# Patient Record
Sex: FEMALE | Race: WHITE | NOT HISPANIC OR LATINO | URBAN - METROPOLITAN AREA
[De-identification: names, ages, dates, MRNs, and addresses within clinical notes are randomized per-mention and may not be internally consistent; named-entity substitution may affect disease eponyms.]

---

## 2014-01-07 VITALS
WEIGHT: 178.57 LBS | BODY MASS INDEX: 31.64 KG/M2 | SYSTOLIC BLOOD PRESSURE: 130 MMHG | HEIGHT: 63 IN | DIASTOLIC BLOOD PRESSURE: 74 MMHG | HEART RATE: 72 BPM

## 2017-04-03 PROBLEM — Z00.00 ENCOUNTER FOR PREVENTIVE HEALTH EXAMINATION: Status: ACTIVE | Noted: 2017-04-03

## 2017-05-24 ENCOUNTER — INPATIENT (INPATIENT)
Facility: HOSPITAL | Age: 82
LOS: 13 days | Discharge: HOME HEALTH | End: 2017-06-07
Attending: PHYSICAL MEDICINE & REHABILITATION

## 2017-05-24 DIAGNOSIS — E11.40 TYPE 2 DIABETES MELLITUS WITH DIABETIC NEUROPATHY, UNSPECIFIED: ICD-10-CM

## 2017-05-24 DIAGNOSIS — E03.9 HYPOTHYROIDISM, UNSPECIFIED: ICD-10-CM

## 2017-05-24 DIAGNOSIS — E78.5 HYPERLIPIDEMIA, UNSPECIFIED: ICD-10-CM

## 2017-05-24 DIAGNOSIS — I63.9 CEREBRAL INFARCTION, UNSPECIFIED: ICD-10-CM

## 2017-05-24 DIAGNOSIS — E11.9 TYPE 2 DIABETES MELLITUS WITHOUT COMPLICATIONS: ICD-10-CM

## 2017-05-24 DIAGNOSIS — K56.609 UNSPECIFIED INTESTINAL OBSTRUCTION, UNSPECIFIED AS TO PARTIAL VERSUS COMPLETE OBSTRUCTION: ICD-10-CM

## 2017-05-24 DIAGNOSIS — I10 ESSENTIAL (PRIMARY) HYPERTENSION: ICD-10-CM

## 2017-05-24 DIAGNOSIS — F41.9 ANXIETY DISORDER, UNSPECIFIED: ICD-10-CM

## 2017-06-26 ENCOUNTER — INPATIENT (INPATIENT)
Facility: HOSPITAL | Age: 82
LOS: 2 days | Discharge: HOME | End: 2017-06-29
Attending: SURGERY | Admitting: INTERNAL MEDICINE

## 2017-06-26 DIAGNOSIS — K56.609 UNSPECIFIED INTESTINAL OBSTRUCTION, UNSPECIFIED AS TO PARTIAL VERSUS COMPLETE OBSTRUCTION: ICD-10-CM

## 2017-06-26 DIAGNOSIS — E03.9 HYPOTHYROIDISM, UNSPECIFIED: ICD-10-CM

## 2017-06-26 DIAGNOSIS — E87.2 ACIDOSIS: ICD-10-CM

## 2017-06-26 DIAGNOSIS — E11.9 TYPE 2 DIABETES MELLITUS WITHOUT COMPLICATIONS: ICD-10-CM

## 2017-06-26 DIAGNOSIS — I10 ESSENTIAL (PRIMARY) HYPERTENSION: ICD-10-CM

## 2017-06-26 DIAGNOSIS — I63.9 CEREBRAL INFARCTION, UNSPECIFIED: ICD-10-CM

## 2017-06-26 DIAGNOSIS — E11.40 TYPE 2 DIABETES MELLITUS WITH DIABETIC NEUROPATHY, UNSPECIFIED: ICD-10-CM

## 2017-06-26 DIAGNOSIS — F41.9 ANXIETY DISORDER, UNSPECIFIED: ICD-10-CM

## 2017-06-26 DIAGNOSIS — E78.5 HYPERLIPIDEMIA, UNSPECIFIED: ICD-10-CM

## 2017-06-28 DIAGNOSIS — E78.5 HYPERLIPIDEMIA, UNSPECIFIED: ICD-10-CM

## 2017-06-28 DIAGNOSIS — F41.9 ANXIETY DISORDER, UNSPECIFIED: ICD-10-CM

## 2017-06-28 DIAGNOSIS — M10.032 IDIOPATHIC GOUT, LEFT WRIST: ICD-10-CM

## 2017-06-28 DIAGNOSIS — I44.7 LEFT BUNDLE-BRANCH BLOCK, UNSPECIFIED: ICD-10-CM

## 2017-06-28 DIAGNOSIS — Z87.891 PERSONAL HISTORY OF NICOTINE DEPENDENCE: ICD-10-CM

## 2017-06-28 DIAGNOSIS — I69.393 ATAXIA FOLLOWING CEREBRAL INFARCTION: ICD-10-CM

## 2017-06-28 DIAGNOSIS — Z86.79 PERSONAL HISTORY OF OTHER DISEASES OF THE CIRCULATORY SYSTEM: ICD-10-CM

## 2017-06-28 DIAGNOSIS — E03.9 HYPOTHYROIDISM, UNSPECIFIED: ICD-10-CM

## 2017-06-28 DIAGNOSIS — E11.65 TYPE 2 DIABETES MELLITUS WITH HYPERGLYCEMIA: ICD-10-CM

## 2017-06-28 DIAGNOSIS — Z90.710 ACQUIRED ABSENCE OF BOTH CERVIX AND UTERUS: ICD-10-CM

## 2017-06-28 DIAGNOSIS — Z79.4 LONG TERM (CURRENT) USE OF INSULIN: ICD-10-CM

## 2017-06-28 DIAGNOSIS — F41.0 PANIC DISORDER [EPISODIC PAROXYSMAL ANXIETY]: ICD-10-CM

## 2017-06-28 DIAGNOSIS — I10 ESSENTIAL (PRIMARY) HYPERTENSION: ICD-10-CM

## 2017-06-28 DIAGNOSIS — I67.82 CEREBRAL ISCHEMIA: ICD-10-CM

## 2017-06-28 DIAGNOSIS — Z88.0 ALLERGY STATUS TO PENICILLIN: ICD-10-CM

## 2017-06-28 DIAGNOSIS — E11.40 TYPE 2 DIABETES MELLITUS WITH DIABETIC NEUROPATHY, UNSPECIFIED: ICD-10-CM

## 2017-07-03 DIAGNOSIS — K56.60 UNSPECIFIED INTESTINAL OBSTRUCTION: ICD-10-CM

## 2017-07-03 DIAGNOSIS — E78.5 HYPERLIPIDEMIA, UNSPECIFIED: ICD-10-CM

## 2017-07-03 DIAGNOSIS — Z86.73 PERSONAL HISTORY OF TRANSIENT ISCHEMIC ATTACK (TIA), AND CEREBRAL INFARCTION WITHOUT RESIDUAL DEFICITS: ICD-10-CM

## 2017-07-03 DIAGNOSIS — R26.9 UNSPECIFIED ABNORMALITIES OF GAIT AND MOBILITY: ICD-10-CM

## 2017-07-03 DIAGNOSIS — Z79.4 LONG TERM (CURRENT) USE OF INSULIN: ICD-10-CM

## 2017-07-03 DIAGNOSIS — Z90.710 ACQUIRED ABSENCE OF BOTH CERVIX AND UTERUS: ICD-10-CM

## 2017-07-03 DIAGNOSIS — E11.40 TYPE 2 DIABETES MELLITUS WITH DIABETIC NEUROPATHY, UNSPECIFIED: ICD-10-CM

## 2017-07-03 DIAGNOSIS — I10 ESSENTIAL (PRIMARY) HYPERTENSION: ICD-10-CM

## 2017-07-03 DIAGNOSIS — Z88.0 ALLERGY STATUS TO PENICILLIN: ICD-10-CM

## 2017-07-03 DIAGNOSIS — Z86.79 PERSONAL HISTORY OF OTHER DISEASES OF THE CIRCULATORY SYSTEM: ICD-10-CM

## 2017-07-03 DIAGNOSIS — E03.9 HYPOTHYROIDISM, UNSPECIFIED: ICD-10-CM

## 2017-11-26 ENCOUNTER — EMERGENCY (EMERGENCY)
Facility: HOSPITAL | Age: 82
LOS: 0 days | Discharge: HOME | End: 2017-11-27
Admitting: INTERNAL MEDICINE

## 2017-11-26 DIAGNOSIS — I63.9 CEREBRAL INFARCTION, UNSPECIFIED: ICD-10-CM

## 2017-11-26 DIAGNOSIS — E78.00 PURE HYPERCHOLESTEROLEMIA, UNSPECIFIED: ICD-10-CM

## 2017-11-26 DIAGNOSIS — E03.9 HYPOTHYROIDISM, UNSPECIFIED: ICD-10-CM

## 2017-11-26 DIAGNOSIS — E11.9 TYPE 2 DIABETES MELLITUS WITHOUT COMPLICATIONS: ICD-10-CM

## 2017-11-26 DIAGNOSIS — R51 HEADACHE: ICD-10-CM

## 2017-11-26 DIAGNOSIS — K56.609 UNSPECIFIED INTESTINAL OBSTRUCTION, UNSPECIFIED AS TO PARTIAL VERSUS COMPLETE OBSTRUCTION: ICD-10-CM

## 2017-11-26 DIAGNOSIS — Z79.4 LONG TERM (CURRENT) USE OF INSULIN: ICD-10-CM

## 2017-11-26 DIAGNOSIS — E78.5 HYPERLIPIDEMIA, UNSPECIFIED: ICD-10-CM

## 2017-11-26 DIAGNOSIS — I10 ESSENTIAL (PRIMARY) HYPERTENSION: ICD-10-CM

## 2017-11-26 DIAGNOSIS — Z79.899 OTHER LONG TERM (CURRENT) DRUG THERAPY: ICD-10-CM

## 2017-11-26 DIAGNOSIS — F41.9 ANXIETY DISORDER, UNSPECIFIED: ICD-10-CM

## 2017-11-26 DIAGNOSIS — Z79.02 LONG TERM (CURRENT) USE OF ANTITHROMBOTICS/ANTIPLATELETS: ICD-10-CM

## 2017-11-26 DIAGNOSIS — R07.89 OTHER CHEST PAIN: ICD-10-CM

## 2017-11-26 DIAGNOSIS — E11.40 TYPE 2 DIABETES MELLITUS WITH DIABETIC NEUROPATHY, UNSPECIFIED: ICD-10-CM

## 2017-11-26 DIAGNOSIS — J06.9 ACUTE UPPER RESPIRATORY INFECTION, UNSPECIFIED: ICD-10-CM

## 2017-11-26 DIAGNOSIS — Z86.73 PERSONAL HISTORY OF TRANSIENT ISCHEMIC ATTACK (TIA), AND CEREBRAL INFARCTION WITHOUT RESIDUAL DEFICITS: ICD-10-CM

## 2018-03-09 ENCOUNTER — INPATIENT (INPATIENT)
Facility: HOSPITAL | Age: 83
LOS: 4 days | Discharge: HOME | End: 2018-03-14
Attending: INTERNAL MEDICINE | Admitting: INTERNAL MEDICINE

## 2018-03-09 VITALS
TEMPERATURE: 97 F | SYSTOLIC BLOOD PRESSURE: 151 MMHG | RESPIRATION RATE: 18 BRPM | DIASTOLIC BLOOD PRESSURE: 66 MMHG | HEART RATE: 97 BPM | OXYGEN SATURATION: 98 %

## 2018-03-09 DIAGNOSIS — F41.0 PANIC DISORDER [EPISODIC PAROXYSMAL ANXIETY]: ICD-10-CM

## 2018-03-09 LAB
ALBUMIN SERPL ELPH-MCNC: 3.4 G/DL — SIGNIFICANT CHANGE UP (ref 3–5.5)
ALP SERPL-CCNC: 94 U/L — SIGNIFICANT CHANGE UP (ref 30–115)
ALT FLD-CCNC: 9 U/L — SIGNIFICANT CHANGE UP (ref 0–41)
ANION GAP SERPL CALC-SCNC: 11 MMOL/L — SIGNIFICANT CHANGE UP (ref 7–14)
APTT BLD: 26.7 SEC — LOW (ref 27–39.2)
AST SERPL-CCNC: 24 U/L — SIGNIFICANT CHANGE UP (ref 0–41)
BASOPHILS # BLD AUTO: 0.07 K/UL — SIGNIFICANT CHANGE UP (ref 0–0.2)
BASOPHILS NFR BLD AUTO: 0.9 % — SIGNIFICANT CHANGE UP (ref 0–1)
BILIRUB SERPL-MCNC: 0.4 MG/DL — SIGNIFICANT CHANGE UP (ref 0.2–1.2)
BUN SERPL-MCNC: 12 MG/DL — SIGNIFICANT CHANGE UP (ref 10–20)
CALCIUM SERPL-MCNC: 9 MG/DL — SIGNIFICANT CHANGE UP (ref 8.5–10.1)
CHLORIDE SERPL-SCNC: 102 MMOL/L — SIGNIFICANT CHANGE UP (ref 98–110)
CHOLEST SERPL-MCNC: 185 MG/DL — SIGNIFICANT CHANGE UP (ref 100–200)
CK MB BLD-MCNC: 3 % — SIGNIFICANT CHANGE UP (ref 0–4)
CK MB BLD-MCNC: 3 % — SIGNIFICANT CHANGE UP (ref 0–4)
CK MB CFR SERPL CALC: 1.1 NG/ML — SIGNIFICANT CHANGE UP (ref 0.6–6.3)
CK MB CFR SERPL CALC: 1.2 NG/ML — SIGNIFICANT CHANGE UP (ref 0.6–6.3)
CK SERPL-CCNC: 36 U/L — SIGNIFICANT CHANGE UP (ref 0–225)
CK SERPL-CCNC: 40 U/L — SIGNIFICANT CHANGE UP (ref 0–225)
CO2 SERPL-SCNC: 24 MMOL/L — SIGNIFICANT CHANGE UP (ref 17–32)
CREAT SERPL-MCNC: 0.7 MG/DL — SIGNIFICANT CHANGE UP (ref 0.7–1.5)
EOSINOPHIL # BLD AUTO: 0.51 K/UL — SIGNIFICANT CHANGE UP (ref 0–0.7)
EOSINOPHIL NFR BLD AUTO: 6.4 % — SIGNIFICANT CHANGE UP (ref 0–8)
GLUCOSE SERPL-MCNC: 330 MG/DL — HIGH (ref 70–110)
HCT VFR BLD CALC: 32.5 % — LOW (ref 37–47)
HDLC SERPL-MCNC: 34 MG/DL — LOW (ref 40–60)
HGB BLD-MCNC: 10.3 G/DL — LOW (ref 12–16)
IMM GRANULOCYTES NFR BLD AUTO: 0.5 % — HIGH (ref 0.1–0.3)
INR BLD: 1.05 RATIO — SIGNIFICANT CHANGE UP (ref 0.65–1.3)
LIPID PNL WITH DIRECT LDL SERPL: 121 MG/DL — HIGH (ref 50–100)
LYMPHOCYTES # BLD AUTO: 1.13 K/UL — LOW (ref 1.2–3.4)
LYMPHOCYTES # BLD AUTO: 14.1 % — LOW (ref 20.5–51.1)
MCHC RBC-ENTMCNC: 24.5 PG — LOW (ref 27–31)
MCHC RBC-ENTMCNC: 31.7 G/DL — LOW (ref 32–37)
MCV RBC AUTO: 77.4 FL — LOW (ref 81–99)
MONOCYTES # BLD AUTO: 0.74 K/UL — HIGH (ref 0.1–0.6)
MONOCYTES NFR BLD AUTO: 9.3 % — SIGNIFICANT CHANGE UP (ref 1.7–9.3)
NEUTROPHILS # BLD AUTO: 5.5 K/UL — SIGNIFICANT CHANGE UP (ref 1.4–6.5)
NEUTROPHILS NFR BLD AUTO: 68.8 % — SIGNIFICANT CHANGE UP (ref 42.2–75.2)
NRBC # BLD: 0 /100 WBCS — SIGNIFICANT CHANGE UP (ref 0–0)
PLATELET # BLD AUTO: 272 K/UL — SIGNIFICANT CHANGE UP (ref 130–400)
POTASSIUM SERPL-MCNC: 4.4 MMOL/L — SIGNIFICANT CHANGE UP (ref 3.5–5)
POTASSIUM SERPL-SCNC: 4.4 MMOL/L — SIGNIFICANT CHANGE UP (ref 3.5–5)
PROT SERPL-MCNC: 5.8 G/DL — LOW (ref 6–8)
PROTHROM AB SERPL-ACNC: 11.4 SEC — SIGNIFICANT CHANGE UP (ref 9.95–12.87)
RBC # BLD: 4.2 M/UL — SIGNIFICANT CHANGE UP (ref 4.2–5.4)
RBC # FLD: 14.7 % — HIGH (ref 11.5–14.5)
SODIUM SERPL-SCNC: 137 MMOL/L — SIGNIFICANT CHANGE UP (ref 135–146)
TOTAL CHOLESTEROL/HDL RATIO MEASUREMENT: 5.4 RATIO — SIGNIFICANT CHANGE UP (ref 4–5.5)
TRIGL SERPL-MCNC: 233 MG/DL — HIGH (ref 40–150)
TROPONIN I SERPL-MCNC: <0.02 NG/ML — SIGNIFICANT CHANGE UP (ref 0–0.05)
TROPONIN I SERPL-MCNC: <0.02 NG/ML — SIGNIFICANT CHANGE UP (ref 0–0.05)
WBC # BLD: 7.99 K/UL — SIGNIFICANT CHANGE UP (ref 4.8–10.8)
WBC # FLD AUTO: 7.99 K/UL — SIGNIFICANT CHANGE UP (ref 4.8–10.8)

## 2018-03-09 RX ORDER — INSULIN HUMAN 100 [IU]/ML
6 INJECTION, SOLUTION SUBCUTANEOUS ONCE
Qty: 0 | Refills: 0 | Status: COMPLETED | OUTPATIENT
Start: 2018-03-09 | End: 2018-03-09

## 2018-03-09 RX ADMIN — Medication 0.5 MILLIGRAM(S): at 21:25

## 2018-03-09 RX ADMIN — Medication 40 MILLIGRAM(S): at 21:25

## 2018-03-09 RX ADMIN — INSULIN HUMAN 6 UNIT(S): 100 INJECTION, SOLUTION SUBCUTANEOUS at 21:25

## 2018-03-09 NOTE — ED BEHAVIORAL HEALTH ASSESSMENT NOTE - RISK ASSESSMENT
Although patient has chronic pphx, and medical comorbidities, patient's risk is mitigated by no SI/HI/AVH, being future oriented, identifying reasons to live well and get healthy, being engaged in treatment and having good social support.

## 2018-03-09 NOTE — ED CDU PROVIDER INITIAL DAY NOTE - ATTENDING CONTRIBUTION TO CARE
Brought to EDOU for further evaluation of dizziness and chest pains, lungs- clear, abdomne- soft no tenderness to any region, neuro- non foca, s1s2 no gallops or murmurs, full workup in progress will continue to re-evaluate

## 2018-03-09 NOTE — ED BEHAVIORAL HEALTH ASSESSMENT NOTE - HPI (INCLUDE ILLNESS QUALITY, SEVERITY, DURATION, TIMING, CONTEXT, MODIFYING FACTORS, ASSOCIATED SIGNS AND SYMPTOMS)
82 year old , female, domiciled with family, retired union worker, with pphx of anxiety and depression, currently on Ativan and Paxil, presents to the ED with daily episodes of anxiety for 1 week. Patient reports that over the past week she has been having experiencing episodes of anxiety which involve shortness of breath, feelings of impending doom, palpitations and worry. Patient reports that the episodes come and go through out the day. Patient reports that she tries to walk around with her walker in order to feel better however she also reports that she also fears falling outside which has limited her ability to go out at do her usual activities. Patient denies any triggers for anxiety episodes. Patient reports that she has week long episodes such as these through out her life which come and go. Patient denies any changes in mood, any feelings of guilt and/or hopelessness. Patient reports that she does feel helpless though as her daughter helps her with everything. Patient reports that she still enjoys activities/hobbies.  Patient denies SI/HI. Patient denies any episodes of taking part in risky behavior. Patient denies any auditory/visual hallucinations.    Collateral: Patient's daughter whom lives with the patient, corroborates patient's story, no safety concerns.

## 2018-03-09 NOTE — ED PROVIDER NOTE - NS ED ROS FT
Constitutional: No fever or chills. Normal appetite. No unintended weight loss.   Eyes: No vision changes.  ENT: No hearing changes. No ear pain. No sore throat.  Neck: No neck pain or stiffness.  Cardiovascular: No new edema. + mild CP and SOB  Pulmonary: No cough. No hemoptysis.  Abdominal: No abdominal pain, nausea, vomiting, or diarrhea.   : No dysuria or frequency. No hematuria.   Neuro: No headache, syncope, or dizziness.  MS: No back pain. No calf pain/swelling.  Psych: No suicidal or homicidal ideations. +anxiety.

## 2018-03-09 NOTE — ED PROVIDER NOTE - OBJECTIVE STATEMENT
82y F w [facility] anxiety disorder who follows at [address] [place] [address] at the [facility] presents for anxiety for the last 5-6 days associated with mild L sided CP and SOB. Pt has never had a cardiac workup, and states that these symptoms are similar to her previous episodes of anxiety, but usually her episodes are improved with small doses of ativan, but it has not helped her this time. Has never seen a cardiologist.   No f/c/n/v/d. No abd pn.

## 2018-03-09 NOTE — ED CDU PROVIDER INITIAL DAY NOTE - PMH
Anxiety    CVA (cerebral vascular accident)    Depression    DM (diabetes mellitus)    HLD (hyperlipidemia)    HTN (hypertension)    Panic disorder

## 2018-03-09 NOTE — ED BEHAVIORAL HEALTH ASSESSMENT NOTE - SUMMARY
82 year old female with pphx of anxiety and depression, presents to the ED with worsening of anxiety. Upon exam, patient is calm and cooperative, is linear, has congruent affect, and does not demonstrate any thought/perceptual abnormalities. Patient denies any SI/HI/AVH. Given patient subjective, exam and hpi, patient does not appear to meet criteria for any acute mood/psychosis symptoms. Although patient is experiencing worsening of her anxiety, patient does have chronic history of such episodes which come and go and patient is well supervised at home by daughter. Patient may benefit from follow up with outpatient psychiatrist upon discharge for further assessment and treatment optimization.

## 2018-03-09 NOTE — ED BEHAVIORAL HEALTH ASSESSMENT NOTE - DESCRIPTION
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 labs

## 2018-03-09 NOTE — ED BEHAVIORAL HEALTH ASSESSMENT NOTE - OTHER PAST PSYCHIATRIC HISTORY (INCLUDE DETAILS REGARDING ONSET, COURSE OF ILLNESS, INPATIENT/OUTPATIENT TREATMENT)
Patient reports that she goes to [facility] to see Dr. Jay Pradhan.   1 [facility]PP admission over 40 years ago for "stress syncope" no suicidality and no SA.

## 2018-03-09 NOTE — ED PROVIDER NOTE - MEDICAL DECISION MAKING DETAILS
Chart finished.  81 yo woman with recurrent anxiety presents with similar symptoms she has had in the past except with chest pain as well.  EKG ok.  Labs ok.  Will place in observation unit for complete ACS workup.

## 2018-03-09 NOTE — ED BEHAVIORAL HEALTH ASSESSMENT NOTE - CASE SUMMARY
82 year old woman with pphx of anxiety and depression, presents to the ED with a period with worsening of anxiety. At this time the patient is not in need of urgent psychiatric intervention.  She is in treatment currently and best practice would be for her to follow up with her outpatient psychiatrist for titration of paxil.

## 2018-03-09 NOTE — ED PROVIDER NOTE - PROGRESS NOTE DETAILS
Pt's daughter approached me and mentioned that the pt has been dizzy at home "for the last couple of days" and was wondering if we are able to get her some physical therapy to make her safer at home. Asked pt about dizziness, and she says that she has been dizzy since her last stroke, and that her doctor told her that it affected her memory and balance. Will discuss with obs to see if they can arrange outpt PT via social work on Saturdays. s/o note:  received s/o from ISABEL Prakash evaluated patient at bedside. Denies any HA/cp/palpitations and sob/ dizziness/lightheadedness currently. patient reports feeling much better. PE: NAD. VSS. s1 s2 no murmur, abd soft/ND/NT/ no swelling and tenderness to extremities. pulses are equal to all extremities. Neuro exam grossly unremarkable. speaking coherently and clear speech. pending stress in am and repeat BMP during 2nd trop.

## 2018-03-09 NOTE — ED PROVIDER NOTE - PHYSICAL EXAMINATION
Constitutional: Well developed, well nourished. NAD. Good general hygiene. Obese.   Head: Atraumatic.  Eyes: PERRLA. EOMI without discomfort.   ENT: No nasal discharge. Mucous membranes moist.  Neck: Supple. Painless ROM.  Cardiovascular: Regular rhythm. Regular rate. Normal S1 and S2. 3/6 aortic systolic murmur. 2+ pulses in all extremities.   Pulmonary: Normal respiratory rate and effort. Lungs clear to auscultation bilaterally. No wheezing, rales, or rhonchi. Bilateral, equal lung expansion.   Abdominal: Soft. Nondistended. Nontender. No rebound or guarding.   Extremities. Pelvis stable. No lower extremity edema. Symmetric calves.  Skin: No rashes.   Neuro: AAOx3. No focal neurological deficits.  Psych: Sad. Normal affect.

## 2018-03-09 NOTE — ED CDU PROVIDER INITIAL DAY NOTE - OBJECTIVE STATEMENT
83 y/o female with PMHX of Depression/Anxiety, Panic Disorder, HTN, HLD, DM, CVA, Former Smoker presenting under two midnight protocol and chest pain. AS per pt, for the past few days has been anxious and having panic attacks. Pt states the last two days have been very severe, so daughter briught her to the ED. Pt states she also has been having chest pain during these episodes. Pain is mid-sternal with radiation up the throat/neck, pressure like sensation. States that when she has the panic attack, she has SOB and hyperventilates. Pt also admits to feeling nauseous and experiencing dizziness. Pt denies any previous cardiac work up. Is followed up as outpatient for anxiety currently on Ativan and Paxil.

## 2018-03-10 DIAGNOSIS — Z98.49 CATARACT EXTRACTION STATUS, UNSPECIFIED EYE: Chronic | ICD-10-CM

## 2018-03-10 DIAGNOSIS — Z98.890 OTHER SPECIFIED POSTPROCEDURAL STATES: Chronic | ICD-10-CM

## 2018-03-10 RX ORDER — MECLIZINE HCL 12.5 MG
12.5 TABLET ORAL DAILY
Qty: 0 | Refills: 0 | Status: DISCONTINUED | OUTPATIENT
Start: 2018-03-10 | End: 2018-03-14

## 2018-03-10 RX ORDER — CLOPIDOGREL BISULFATE 75 MG/1
75 TABLET, FILM COATED ORAL DAILY
Qty: 0 | Refills: 0 | Status: DISCONTINUED | OUTPATIENT
Start: 2018-03-10 | End: 2018-03-14

## 2018-03-10 RX ORDER — AMLODIPINE BESYLATE 2.5 MG/1
5 TABLET ORAL DAILY
Qty: 0 | Refills: 0 | Status: DISCONTINUED | OUTPATIENT
Start: 2018-03-10 | End: 2018-03-14

## 2018-03-10 RX ORDER — SPIRONOLACTONE 25 MG/1
0 TABLET, FILM COATED ORAL
Qty: 0 | Refills: 0 | COMMUNITY

## 2018-03-10 RX ORDER — INSULIN LISPRO 100/ML
12 VIAL (ML) SUBCUTANEOUS
Qty: 0 | Refills: 0 | Status: DISCONTINUED | OUTPATIENT
Start: 2018-03-10 | End: 2018-03-14

## 2018-03-10 RX ORDER — ASPIRIN/CALCIUM CARB/MAGNESIUM 324 MG
81 TABLET ORAL DAILY
Qty: 0 | Refills: 0 | Status: DISCONTINUED | OUTPATIENT
Start: 2018-03-10 | End: 2018-03-14

## 2018-03-10 RX ORDER — ENOXAPARIN SODIUM 100 MG/ML
40 INJECTION SUBCUTANEOUS EVERY 24 HOURS
Qty: 0 | Refills: 0 | Status: DISCONTINUED | OUTPATIENT
Start: 2018-03-10 | End: 2018-03-14

## 2018-03-10 RX ORDER — LEVOTHYROXINE SODIUM 125 MCG
112 TABLET ORAL DAILY
Qty: 0 | Refills: 0 | Status: DISCONTINUED | OUTPATIENT
Start: 2018-03-10 | End: 2018-03-14

## 2018-03-10 RX ORDER — INSULIN DETEMIR 100/ML (3)
34 INSULIN PEN (ML) SUBCUTANEOUS
Qty: 0 | Refills: 0 | COMMUNITY

## 2018-03-10 RX ORDER — INSULIN GLARGINE 100 [IU]/ML
34 INJECTION, SOLUTION SUBCUTANEOUS AT BEDTIME
Qty: 0 | Refills: 0 | Status: DISCONTINUED | OUTPATIENT
Start: 2018-03-10 | End: 2018-03-14

## 2018-03-10 RX ORDER — LEVOTHYROXINE SODIUM 125 MCG
1 TABLET ORAL
Qty: 0 | Refills: 0 | COMMUNITY

## 2018-03-10 RX ORDER — METOPROLOL TARTRATE 50 MG
25 TABLET ORAL
Qty: 0 | Refills: 0 | Status: DISCONTINUED | OUTPATIENT
Start: 2018-03-10 | End: 2018-03-14

## 2018-03-10 RX ORDER — GABAPENTIN 400 MG/1
0 CAPSULE ORAL
Qty: 0 | Refills: 0 | COMMUNITY

## 2018-03-10 RX ORDER — SPIRONOLACTONE 25 MG/1
25 TABLET, FILM COATED ORAL DAILY
Qty: 0 | Refills: 0 | Status: DISCONTINUED | OUTPATIENT
Start: 2018-03-10 | End: 2018-03-14

## 2018-03-10 RX ORDER — GABAPENTIN 400 MG/1
300 CAPSULE ORAL THREE TIMES A DAY
Qty: 0 | Refills: 0 | Status: DISCONTINUED | OUTPATIENT
Start: 2018-03-10 | End: 2018-03-14

## 2018-03-10 RX ORDER — INSULIN LISPRO 100/ML
0 VIAL (ML) SUBCUTANEOUS
Qty: 0 | Refills: 0 | COMMUNITY

## 2018-03-10 RX ORDER — SIMVASTATIN 20 MG/1
40 TABLET, FILM COATED ORAL AT BEDTIME
Qty: 0 | Refills: 0 | Status: DISCONTINUED | OUTPATIENT
Start: 2018-03-10 | End: 2018-03-13

## 2018-03-10 RX ORDER — MECLIZINE HCL 12.5 MG
1 TABLET ORAL
Qty: 0 | Refills: 0 | COMMUNITY

## 2018-03-10 RX ADMIN — CLOPIDOGREL BISULFATE 75 MILLIGRAM(S): 75 TABLET, FILM COATED ORAL at 18:56

## 2018-03-10 RX ADMIN — Medication 81 MILLIGRAM(S): at 18:56

## 2018-03-10 RX ADMIN — INSULIN GLARGINE 34 UNIT(S): 100 INJECTION, SOLUTION SUBCUTANEOUS at 21:45

## 2018-03-10 RX ADMIN — Medication 0.5 MILLIGRAM(S): at 21:44

## 2018-03-10 RX ADMIN — GABAPENTIN 300 MILLIGRAM(S): 400 CAPSULE ORAL at 21:44

## 2018-03-10 RX ADMIN — Medication 12.5 MILLIGRAM(S): at 18:56

## 2018-03-10 RX ADMIN — Medication 25 MILLIGRAM(S): at 18:56

## 2018-03-10 RX ADMIN — AMLODIPINE BESYLATE 5 MILLIGRAM(S): 2.5 TABLET ORAL at 18:56

## 2018-03-10 RX ADMIN — SPIRONOLACTONE 25 MILLIGRAM(S): 25 TABLET, FILM COATED ORAL at 18:56

## 2018-03-10 RX ADMIN — SIMVASTATIN 40 MILLIGRAM(S): 20 TABLET, FILM COATED ORAL at 21:45

## 2018-03-10 RX ADMIN — Medication 40 MILLIGRAM(S): at 14:03

## 2018-03-10 RX ADMIN — Medication 12 UNIT(S): at 18:57

## 2018-03-10 NOTE — H&P ADULT - NSHPREVIEWOFSYSTEMS_GEN_ALL_CORE
Review of Systems:   CONSTITUTIONAL: No fever, weight changes, fatigue, appetite changes  ENMT:  No difficulty hearing  NECK: No pain or stiffness  RESPIRATORY: No cough, wheezing, chills or hemoptysis; + shortness of breath  CARDIOVASCULAR: + chest pain, palpitations, dizziness.  GASTROINTESTINAL: No abdominal or epigastric pain. No nausea, vomiting, or hematemesis; No diarrhea or constipation  GENITOURINARY: No dysuria, + frequency  NEUROLOGICAL: + dizziness, spinning sensation + LE neuropathy  PSYCHIATRIC: + anxiety

## 2018-03-10 NOTE — ED CDU PROVIDER DISPOSITION NOTE - CLINICAL COURSE
Patient was sent to EDOU for further evaluation of chest pains, dizziness, feeling very weak and anxiouss, ekgs times two LBBB, enzymes normal, Nuclear stress testing was read as normal, Patient feeling dizzy and has difficulty with ambulation, was seen by psychiatry

## 2018-03-10 NOTE — H&P ADULT - FAMILY HISTORY
Father  Still living? Unknown  Family history of prostate cancer in father, Age at diagnosis: Age Unknown     Sibling  Still living? No  Family history of breast cancer, Age at diagnosis: Age Unknown

## 2018-03-10 NOTE — ED ADULT NURSE REASSESSMENT NOTE - NS ED NURSE REASSESS COMMENT FT1
Patient refusing bed alarm at this time. Patient instructed not to get out of bed on own. Daughter at bedside. Both patient and daughter verbalize understanding.
Pt on continuous cardiac monitoring, respirations even and unlabored, vs stable, complaints at this time will continue to monitor and assess.
Received patient under my care patient offers no complaints awake and oriented X 3 NPO maintained on continous cardiac showing NSR.  Awaiting further testing and disposition safety maintained

## 2018-03-10 NOTE — ED CDU PROVIDER DISPOSITION NOTE - ATTENDING CONTRIBUTION TO CARE
Patient had a ffull cardiac workup and all normal, Patient dizzy and ataxic gait, will need rehab-physical therapy evaluation, spoke to Sheila Du and accepts admission to his service

## 2018-03-10 NOTE — H&P ADULT - HISTORY OF PRESENT ILLNESS
81 yo F with PMHx of anxiety/ panic disorder, DM II, DLD, HTN, CVA with no residual weakness, AAA, brain aneurysm presents for 5-6 days of palpitations, SOB, CP and worsening anxiety. Pt also complained of dizziness that felt like spinning sensation that comes and goes. Pt reports that theses symptoms are similar to her previous anxiety episodes and  symptoms are usually relieved with small dose of ativan. However, symptoms did not go away with ativan the day of presentation and pt decided to come to the ED. EKG done in ED found new LBBB and pt was send for cardiac stress test which was negative.   Pt denies fever, chills, abdominal pain, N/V/D. Denies pain on urination but admits for urinary frequency.    Pt's daughter reports that pt has been having unsteady gait recently and at baseline, pt walks with a rolling walker.  Per ED, pt did have a wobbly/unsteady gait in ED. 83 yo F with PMHx of anxiety/ panic disorder, DM II, DLD, HTN, CVA with no residual weakness, AAA, brain aneurysm presents for 5-6 days of palpitations, SOB, CP and worsening anxiety. Pt also complained of dizziness that felt like spinning sensation that comes and goes. CP is substernal, non-radiating, pressure like, and goes away when anxiety episode subsides. Pt reports that theses symptoms are similar to her previous anxiety episodes and  symptoms are usually relieved with small dose of ativan. However, symptoms did not go away with ativan the day of presentation and pt decided to come to the ED. EKG done in ED found new LBBB and pt was send for cardiac stress test which was negative.   Pt denies fever, chills, abdominal pain, N/V/D. Denies pain on urination but admits for urinary frequency.    Pt's daughter reports that pt has been having unsteady gait recently and at baseline, pt walks with a rolling walker.  Per ED, pt did have a wobbly/unsteady gait in ED. 83 yo F with PMHx of anxiety/ panic disorder, DM II, DLD, HTN, CVA with no residual weakness, AAA, brain aneurysm presents for 5-6 days of palpitations, SOB, CP and worsening anxiety. Pt also complained of dizziness that felt like spinning sensation that comes and goes. CP is substernal, non-radiating, pressure like, and goes away when anxiety episode subsides. Pt reports that theses symptoms are similar to her previous anxiety episodes and  symptoms are usually relieved with small dose of ativan. However, symptoms did not go away with ativan the day of presentation and pt decided to come to the ED. Per ED, pt was found to have new LBBB on EKG and pt was send for cardiac stress test which was negative.   Pt denies fever, chills, abdominal pain, N/V/D. Denies pain on urination but admits for urinary frequency.    Pt's daughter reports that pt has been having unsteady gait recently and per ED, pt did have a wobbly/unsteady gait in ED. At baseline, pt walks with a rolling walker. 83 yo F with PMHx of anxiety/ panic disorder, DM II, DLD, HTN, CVA with no residual weakness, AAA, brain aneurysm presents for 5-6 days of palpitations, SOB, CP and worsening anxiety. Pt also complained of dizziness that felt like spinning sensation that comes and goes. CP is substernal, non-radiating, pressure like, and goes away when anxiety episode subsides. Pt reports that theses palpitation episodes happened multiple times per day over the last 5 days. Symptoms are similar to her previous anxiety episodes and they are usually relieved with small dose of ativan. However, these symptoms did not go away with ativan the day of presentation and pt decided to come to the ED. Per ED, pt was found to have new LBBB on EKG in the ED and pt was send for cardiac stress test which was negative. (LBBB is not new, seen in previous EKGs)  Pt denies fever, chills, abdominal pain, N/V/D. Denies pain on urination but admits for urinary frequency.    Pt's daughter reports that pt has been having unsteady gait recently and per ED, pt did have a wobbly/unsteady gait. At baseline, pt walks with a rolling walker.

## 2018-03-10 NOTE — H&P ADULT - PMH
AAA (abdominal aortic aneurysm)    Anxiety    Brain aneurysm    CVA (cerebral vascular accident)    Depression    DM (diabetes mellitus)    HLD (hyperlipidemia)    HTN (hypertension)    Hypothyroid    Panic disorder    Vertigo

## 2018-03-10 NOTE — H&P ADULT - ASSESSMENT
81 yo F with PMHx of anxiety/ panic disorder, DM II, DLD, HTN, CVA with no residual weakness, AAA, brain aneurysm presents for 5-6 days of palpitations, SOB, CP and worsening anxiety.    # palpitaitons/CP/SOB likely 2/2 anxiety episodes. currently asymptomatic   - seen by psych in ED: no needs for inpatient psych, f/u as outpatient for titration of Paxil  - pt's daughter requested cardiology c/s to review cardiac medications and r/o arrhythmia.  - c/w Paxil 40mg qD and ativan 0.5mg qHS    # dizziness (spinning sensation) likely 2/2 vertigo  - c/w home med (meclizine 12.5 mg qD). consider increasing frequency if dizziness persists.    # unsteady gait  - Pt/ Rehab    # increased urinary frequency  - f/u UA    # HTN  - c/w home meds    # DMII  - f/u FS and insulin SS  - Hba1c    # dvt ppx and no need for GI ppx 81 yo F with PMHx of anxiety/ panic disorder, DM II, DLD, HTN, CVA with no residual weakness, AAA, brain aneurysm presents for 5-6 days of palpitations, SOB, CP and worsening anxiety.    # palpitaitons/CP/SOB likely 2/2 anxiety episodes. currently asymptomatic   - seen by psych in ED: no needs for inpatient psych, f/u as outpatient for titration of Paxil  - pt's daughter requested cardiology c/s to review cardiac medications and r/o arrhythmia.  - c/w Paxil 40mg qD and ativan 0.5mg qHS    # new LBBB on EKG  - 2 set CE negative  - cardiac stress test negative    # dizziness (spinning sensation) likely 2/2 vertigo  - c/w home med (meclizine 12.5 mg qD). consider increasing frequency if dizziness persists.    # unsteady gait  - Pt/ Rehab    # increased urinary frequency  - f/u UA    # HTN  - c/w home meds    # DMII  - f/u FS and insulin SS  - Hba1c    # dvt ppx and no need for GI ppx 83 yo F with PMHx of anxiety/ panic disorder, DM II, DLD, HTN, CVA with no residual weakness, AAA, brain aneurysm presents for 5-6 days of palpitations, SOB, CP and worsening anxiety.    # palpitaitons/CP/SOB likely 2/2 anxiety episodes. currently asymptomatic   - seen by psych in ED: no needs for inpatient psych, f/u as outpatient for titration of Paxil  - pt's daughter requested cardiology c/s to review cardiac medications and r/o arrhythmia.  - per pt, she doesn't have any cardiologist outpt, would benefit from establishing care with a cardiologist.   - c/w Paxil 40mg qD and ativan 0.5mg qHS    # LBBB on EKG (LBBB NOT new)  - 2 set CE negative  - cardiac stress test negative    # dizziness (spinning sensation) likely 2/2 vertigo  - c/w home med (meclizine 12.5 mg qD). consider increasing frequency if dizziness persists.    # unsteady gait  - Pt/ Rehab    # increased urinary frequency  - f/u UA    # HTN  - c/w home meds    # DMII  - f/u FS and insulin SS  - Hba1c    # h/o CVA  - c/w Plavix, ASA    # dvt ppx and no need for GI ppx 83 yo F with PMHx of anxiety/ panic disorder, DM II, DLD, HTN, CVA with no residual weakness, AAA, brain aneurysm presents for 5-6 days of palpitations, SOB, CP and worsening anxiety.    # palpitaitons/CP/SOB likely 2/2 anxiety episodes. currently asymptomatic   - seen by psych in ED: no needs for inpatient psych, f/u as outpatient for titration of Paxil  - pt's daughter requested cardiology c/s to review cardiac medications and r/o arrhythmia.  - per pt, she doesn't have any cardiologist outpt, would benefit from establishing care with a cardiologist.   - c/w Paxil 40mg qD and ativan 0.5mg qHS  - f/u TSH    # LBBB on EKG (LBBB NOT new)  - 2 set CE negative  - cardiac stress test negative    # dizziness (spinning sensation) likely 2/2 vertigo  - c/w home med (meclizine 12.5 mg qD). consider increasing frequency if dizziness persists.    # unsteady gait  - Pt/ Rehab    # increased urinary frequency  - f/u UA    # HTN  - c/w home meds    # DMII  - f/u FS and insulin SS  - Hba1c    # h/o CVA  - c/w Plavix, ASA    # dvt ppx and no need for GI ppx

## 2018-03-10 NOTE — H&P ADULT - NSHPPHYSICALEXAM_GEN_ALL_CORE
GENERAL: NAD, well-developed  HEAD:  Atraumatic, Normocephalic  EYES: EOMI, PERRLA, conjunctiva and sclera clear  NECK: Supple, No JVD  CHEST/LUNG: Clear to auscultation bilaterally; No wheeze  HEART: Regular rate and rhythm; + murmur  ABDOMEN: Soft, Nontender, Nondistended; Bowel sounds present  EXTREMITIES:  No clubbing, cyanosis, or edema  PSYCH:  normal affect on exam  NEUROLOGY: non-focal, AAO X3. CN II- XII grossly normal. sensation intact. gait: deferred (pt afraid of falling)

## 2018-03-10 NOTE — H&P ADULT - NSHPLABSRESULTS_GEN_ALL_CORE
T(C): 36.1 (03-10-18 @ 15:58), Max: 36.9 (03-10-18 @ 12:13)  T(F): 96.9 (03-10-18 @ 15:58), Max: 98.4 (03-10-18 @ 12:13)  HR: 77 (03-10-18 @ 15:58) (76 - 84)  BP: 184/79 (03-10-18 @ 15:58) (179/79 - 190/83)  RR: 18 (03-10-18 @ 15:58) (18 - 19)  SpO2: 95% (03-10-18 @ 15:58) (95% - 96%)  Labs:                         10.3<L>  7.99    )-----------(   272      ( 09 Mar 2018 13:41 )              32.5<L>    Neutro%  68.8    Lympho%  14.1<L>   Mono%    9.3     Bands    x        03-09    137  |  102  |  12  ----------------------------<  330<H>  4.4   |  24  |  0.7    Ca    9.0      09 Mar 2018 13:41    TPro  5.8<L>  /  Alb  3.4  /  TBili  0.4  /  DBili  x   /  AST  24  /  ALT  9   /  AlkPhos  94  03-09    eGFR if Non African American: 80 mL/min/1.73M2 (03-09-18 @ 13:41)  eGFR if : 92 mL/min/1.73M2 (03-09-18 @ 13:41)      ( 09 Mar 2018 13:41 )   PT: 11.40 sec;   INR: 1.05 ratio;       PTT:26.7 sec    CARDIAC MARKERS ( 09 Mar 2018 19:33 )  <0.02 ng/mL / x     / 40 U/L / x     / 1.2 ng/mL    < from: NM Nuclear Stress Pharmacologic Multiple (03.10.18 @ 11:33) >      Impression:   1. NORMAL ADENOSINE / REST MYOCARDIAL PERFUSION TOMOGRAPHY, WITH NO   EVIDENCE FOR ISCHEMIA DURING ADENOSINE INFUSION.   2. NORMAL RESTING LEFT VENTRICULAR WALL MOTION AND WALL THICKENING.  3. LEFT VENTRICULAR EJECTION FRACTION OF  53% WHICH IS WITHIN RANGE OF   NORMAL.     < end of copied text > T(C): 36.1 (03-10-18 @ 15:58), Max: 36.9 (03-10-18 @ 12:13)  T(F): 96.9 (03-10-18 @ 15:58), Max: 98.4 (03-10-18 @ 12:13)  HR: 77 (03-10-18 @ 15:58) (76 - 84)  BP: 184/79 (03-10-18 @ 15:58) (179/79 - 190/83)  RR: 18 (03-10-18 @ 15:58) (18 - 19)  SpO2: 95% (03-10-18 @ 15:58) (95% - 96%)  Labs:                         10.3<L>  7.99    )-----------(   272      ( 09 Mar 2018 13:41 )              32.5<L>    Neutro%  68.8    Lympho%  14.1<L>   Mono%    9.3     Bands    x        03-09    137  |  102  |  12  ----------------------------<  330<H>  4.4   |  24  |  0.7    Ca    9.0      09 Mar 2018 13:41    TPro  5.8<L>  /  Alb  3.4  /  TBili  0.4  /  DBili  x   /  AST  24  /  ALT  9   /  AlkPhos  94  03-09    eGFR if Non African American: 80 mL/min/1.73M2 (03-09-18 @ 13:41)  eGFR if : 92 mL/min/1.73M2 (03-09-18 @ 13:41)      ( 09 Mar 2018 13:41 )   PT: 11.40 sec;   INR: 1.05 ratio;       PTT:26.7 sec    CARDIAC MARKERS ( 09 Mar 2018 19:33 )  <0.02 ng/mL / x     / 40 U/L / x     / 1.2 ng/mL    < from: NM Nuclear Stress Pharmacologic Multiple (03.10.18 @ 11:33) >      Impression:   1. NORMAL ADENOSINE / REST MYOCARDIAL PERFUSION TOMOGRAPHY, WITH NO   EVIDENCE FOR ISCHEMIA DURING ADENOSINE INFUSION.   2. NORMAL RESTING LEFT VENTRICULAR WALL MOTION AND WALL THICKENING.  3. LEFT VENTRICULAR EJECTION FRACTION OF  53% WHICH IS WITHIN RANGE OF   NORMAL.     < end of copied text >    ECHO (5/2017)  EF: 55%-60%. Grade I DD. mild to mod AR, mild MR, mild VT.     EKG  NSR  LBBB T(C): 36.1 (03-10-18 @ 15:58), Max: 36.9 (03-10-18 @ 12:13)  T(F): 96.9 (03-10-18 @ 15:58), Max: 98.4 (03-10-18 @ 12:13)  HR: 77 (03-10-18 @ 15:58) (76 - 84)  BP: 184/79 (03-10-18 @ 15:58) (179/79 - 190/83)  RR: 18 (03-10-18 @ 15:58) (18 - 19)  SpO2: 95% (03-10-18 @ 15:58) (95% - 96%)  Labs:                         10.3<L>  7.99    )-----------(   272      ( 09 Mar 2018 13:41 )              32.5<L>    Neutro%  68.8    Lympho%  14.1<L>   Mono%    9.3     Bands    x        03-09    137  |  102  |  12  ----------------------------<  330<H>  4.4   |  24  |  0.7    Ca    9.0      09 Mar 2018 13:41    TPro  5.8<L>  /  Alb  3.4  /  TBili  0.4  /  DBili  x   /  AST  24  /  ALT  9   /  AlkPhos  94  03-09    eGFR if Non African American: 80 mL/min/1.73M2 (03-09-18 @ 13:41)  eGFR if : 92 mL/min/1.73M2 (03-09-18 @ 13:41)      ( 09 Mar 2018 13:41 )   PT: 11.40 sec;   INR: 1.05 ratio;       PTT:26.7 sec    CARDIAC MARKERS ( 09 Mar 2018 19:33 )  <0.02 ng/mL / x     / 40 U/L / x     / 1.2 ng/mL    < from: NM Nuclear Stress Pharmacologic Multiple (03.10.18 @ 11:33) >      Impression:   1. NORMAL ADENOSINE / REST MYOCARDIAL PERFUSION TOMOGRAPHY, WITH NO   EVIDENCE FOR ISCHEMIA DURING ADENOSINE INFUSION.   2. NORMAL RESTING LEFT VENTRICULAR WALL MOTION AND WALL THICKENING.  3. LEFT VENTRICULAR EJECTION FRACTION OF  53% WHICH IS WITHIN RANGE OF   NORMAL.     < end of copied text >    ECHO (5/2017)  EF: 55%-60%. Grade I DD. mild to mod AR, mild MR, mild PA.     EKG  NSR  LBBB  QTc 505ms

## 2018-03-11 LAB
APPEARANCE UR: CLEAR — SIGNIFICANT CHANGE UP
BILIRUB UR-MCNC: NEGATIVE — SIGNIFICANT CHANGE UP
COLOR SPEC: YELLOW — SIGNIFICANT CHANGE UP
DIFF PNL FLD: NEGATIVE — SIGNIFICANT CHANGE UP
ESTIMATED AVERAGE GLUCOSE: 229 MG/DL — HIGH (ref 68–114)
GLUCOSE UR QL: 500 MG/DL
HBA1C BLD-MCNC: 9.6 % — HIGH (ref 4–5.6)
KETONES UR-MCNC: NEGATIVE — SIGNIFICANT CHANGE UP
LEUKOCYTE ESTERASE UR-ACNC: NEGATIVE — SIGNIFICANT CHANGE UP
NITRITE UR-MCNC: NEGATIVE — SIGNIFICANT CHANGE UP
PH UR: 6.5 — SIGNIFICANT CHANGE UP (ref 5–8)
PROT UR-MCNC: NEGATIVE MG/DL — SIGNIFICANT CHANGE UP
SP GR SPEC: 1.01 — SIGNIFICANT CHANGE UP (ref 1.01–1.03)
UROBILINOGEN FLD QL: 1 MG/DL (ref 0.2–0.2)

## 2018-03-11 RX ADMIN — Medication 12 UNIT(S): at 09:50

## 2018-03-11 RX ADMIN — GABAPENTIN 300 MILLIGRAM(S): 400 CAPSULE ORAL at 05:13

## 2018-03-11 RX ADMIN — INSULIN GLARGINE 34 UNIT(S): 100 INJECTION, SOLUTION SUBCUTANEOUS at 22:01

## 2018-03-11 RX ADMIN — ENOXAPARIN SODIUM 40 MILLIGRAM(S): 100 INJECTION SUBCUTANEOUS at 05:15

## 2018-03-11 RX ADMIN — SIMVASTATIN 40 MILLIGRAM(S): 20 TABLET, FILM COATED ORAL at 22:00

## 2018-03-11 RX ADMIN — CLOPIDOGREL BISULFATE 75 MILLIGRAM(S): 75 TABLET, FILM COATED ORAL at 11:25

## 2018-03-11 RX ADMIN — Medication 0.5 MILLIGRAM(S): at 22:00

## 2018-03-11 RX ADMIN — Medication 12 UNIT(S): at 12:58

## 2018-03-11 RX ADMIN — Medication 25 MILLIGRAM(S): at 17:22

## 2018-03-11 RX ADMIN — Medication 12 UNIT(S): at 16:30

## 2018-03-11 RX ADMIN — SPIRONOLACTONE 25 MILLIGRAM(S): 25 TABLET, FILM COATED ORAL at 05:14

## 2018-03-11 RX ADMIN — Medication 12.5 MILLIGRAM(S): at 11:25

## 2018-03-11 RX ADMIN — GABAPENTIN 300 MILLIGRAM(S): 400 CAPSULE ORAL at 22:00

## 2018-03-11 RX ADMIN — Medication 25 MILLIGRAM(S): at 05:13

## 2018-03-11 RX ADMIN — Medication 81 MILLIGRAM(S): at 11:25

## 2018-03-11 RX ADMIN — Medication 40 MILLIGRAM(S): at 11:25

## 2018-03-11 RX ADMIN — Medication 112 MICROGRAM(S): at 05:13

## 2018-03-11 RX ADMIN — GABAPENTIN 300 MILLIGRAM(S): 400 CAPSULE ORAL at 17:22

## 2018-03-11 RX ADMIN — AMLODIPINE BESYLATE 5 MILLIGRAM(S): 2.5 TABLET ORAL at 05:14

## 2018-03-11 NOTE — CONSULT NOTE ADULT - ATTENDING COMMENTS
Patient was seen and examined. Discussed with the cardiology fellow.  I agree with the findings and plan as documented by the fellow.

## 2018-03-11 NOTE — PROGRESS NOTE ADULT - SUBJECTIVE AND OBJECTIVE BOX
Blood sugars  poorly controlled  mg and pre lunch 283  mg. No hypoglycemic symptoms. Says still feels unsteady on feet. Had cerebellar infarct some 5 yrs ago. Ambulation has not been the same.  Symptoms got worse a week before admission. Had frequent falls.  Hypothyroid, euthyroid  on synthroid, Heart regular chest clear.  PLAN, Neurology consult, Rehab consult for possible short term rehab. Monitor blood sugars.

## 2018-03-11 NOTE — CONSULT NOTE ADULT - ASSESSMENT
-Intermittent palpitation and chest discomfort likely anxiety related , r/o tachyarrhythmia  -Dizziness  -Moderate AS  -Uncontrolled HTN    Plan:  -repeat 2decho for evaluation for valvulopathy  -Consider holter monitoring as outpatient  -Consider add ACE or ARB for better HTN control,   -Check orthostatic hypotension if positive stop amlodipine.     * will discuss with cardiology attending -Intermittent palpitation and chest discomfort likely anxiety related , r/o tachyarrhythmia  -Dizziness  -Moderate AS  -Uncontrolled HTN    Plan:  -repeat 2decho for evaluation for valvulopathy  -Consider holter monitoring as outpatient  -Consider add ACE or ARB for better HTN control,   -Check orthostatic hypotension if positive stop amlodipine.     * will discuss with cardiology attending      Attending.: patient seen and examined. Discussed with the fellow.  Negative nuclear stress test. Preserved EF. Moderate AS by prior echo.   Physical examination does not suggest a hemodynamically significant AS (S2 is preserved, early peaking murmur, preserved peripheral pulses).  Repeat echo. Agree with recommendations as above.

## 2018-03-11 NOTE — CONSULT NOTE ADULT - SUBJECTIVE AND OBJECTIVE BOX
HISTORY OF PRESENT ILLNESS:   This is an 82 years old  female patient  with no known previous cardiac disease with PMHx of anxiety/ panic disorder, DM II, DLD, HTN, CVA with no residual weakness, AAA, brain aneurysm admitted initially to hospital for intermittent palpitations, SOB, CP that though to be related to her worsening anxiety. Pt also complained of dizziness with frequent falls lately.   underwent a nuclear stress test that was negative. Patient seen at bedside, denies active chest pain shortness of breath or palpitations.       PAST MEDICAL & SURGICAL HISTORY:  Vertigo  Hypothyroid  Brain aneurysm  AAA (abdominal aortic aneurysm)  HLD (hyperlipidemia)  HTN (hypertension)  Depression  Anxiety  Panic disorder  DM (diabetes mellitus)  CVA (cerebral vascular accident)  H/O: hysterectomy  Status post cataract surgery    FAMILY HISTORY:  Family history of breast cancer (Sibling)  Family history of prostate cancer in father (Father)    Allergies    codeine (Nausea)  IV Contrast (Anaphylaxis)  penicillin (Hives)  shellfish (Unknown)      	  Home Medications:  amLODIPine 5 mg oral tablet: 1 tab(s) orally once a day (10 Mar 2018 17:59)  Aspir 81: orally once a day (10 Mar 2018 17:59)  Ativan 0.5 mg oral tablet: orally once a day (at bedtime) (10 Mar 2018 17:59)  gabapentin 300 mg oral capsule: orally 3 times a day (10 Mar 2018 17:59)  HumaLOG 100 units/mL subcutaneous solution: with meals 12 units (10 Mar 2018 17:59)  Levemir 100 units/mL subcutaneous solution: 34 unit(s) subcutaneous once a day (at bedtime) (10 Mar 2018 17:59)  levothyroxine 112 mcg (0.112 mg) oral tablet: 1 tab(s) orally once a day (10 Mar 2018 17:59)  meclizine 12.5 mg oral tablet: 1 tab(s) orally once a day (10 Mar 2018 17:59)  metoprolol tartrate 25 mg oral tablet: 1 tab(s) orally 2 times a day (10 Mar 2018 17:59)  PARoxetine 40 mg oral tablet: 1 tab(s) orally once a day (10 Mar 2018 17:59)  Plavix 75 mg oral tablet: 1 tab(s) orally once a day (10 Mar 2018 17:59)  simvastatin 40 mg oral tablet: 1 tab(s) orally once a day (at bedtime) (10 Mar 2018 17:59)  spironolactone 25 mg oral tablet: orally once a day (10 Mar 2018 17:59)    MEDICATIONS  (STANDING):  amLODIPine   Tablet 5 milliGRAM(s) Oral daily  aspirin  chewable 81 milliGRAM(s) Oral daily  clopidogrel Tablet 75 milliGRAM(s) Oral daily  enoxaparin Injectable 40 milliGRAM(s) SubCutaneous every 24 hours  gabapentin 300 milliGRAM(s) Oral three times a day  insulin glargine Injectable (LANTUS) 34 Unit(s) SubCutaneous at bedtime  insulin lispro Injectable (HumaLOG) 12 Unit(s) SubCutaneous three times a day before meals  levothyroxine 112 MICROGram(s) Oral daily  LORazepam     Tablet 0.5 milliGRAM(s) Oral at bedtime  meclizine 12.5 milliGRAM(s) Oral daily  metoprolol     tartrate 25 milliGRAM(s) Oral two times a day  PARoxetine 40 milliGRAM(s) Oral daily  simvastatin 40 milliGRAM(s) Oral at bedtime  spironolactone 25 milliGRAM(s) Oral daily    REVIEW OF SYSTEMS:  negative except above    PHYSICAL EXAM:  T(C): 36.1 (03-11-18 @ 05:29), Max: 36.9 (03-10-18 @ 12:13)  HR: 67 (03-11-18 @ 05:29) (67 - 84)  BP: 178/81 (03-11-18 @ 05:29) (168/79 - 190/83)  RR: 18 (03-11-18 @ 05:29) (18 - 19)  SpO2: 95% (03-10-18 @ 18:35) (95% - 96%)        General Appearance: Normal	  Cardiovascular: Normal S1 S2, mild systolic murmur  Respiratory: Lungs clear to auscultation	  Psychiatry: A & O x 3,   Gastrointestinal:  Soft, Non-tender  Extremities: no CAITLYN        LABS:	 	                        10.3   7.99  )-----------( 272      ( 09 Mar 2018 13:41 )             32.5     03-09    137  |  102  |  12  ----------------------------<  330<H>  4.4   |  24  |  0.7    Ca    9.0      09 Mar 2018 13:41    TPro  5.8<L>  /  Alb  3.4  /  TBili  0.4  /  DBili  x   /  AST  24  /  ALT  9   /  AlkPhos  94  03-09        CARDIAC MARKERS:  Troponin I, Serum: <0.02 ng/mL (03-09 @ 19:33)  Troponin I, Serum: <0.02 ng/mL (03-09 @ 13:41)          ECG:  < from: 12 Lead ECG (03.09.18 @ 23:27) >   Normal sinus rhythm  Left bundle branch block chronic    < end of copied text >  	  RADIOLOGY:   < from: Xray Chest 1 View AP/PA (03.09.18 @ 15:37) >  Impression:      No radiographic evidence of acute cardiopulmonary disease.      < end of copied text >  	    PREVIOUS DIAGNOSTIC TESTING:    [ ] Echocardiogram: 5/2015  Summary   Left ventricle: Systolic function was normal. Ejection fraction was estimated   in the range of 55 % to 65 %. Although no diagnostic regional wall motion   abnormality was identified, this possibility cannot be completely excluded on   the basis of this study. Wall thickness was mildly increased. Doppler   parameters were consistent with abnormal left ventricular relaxation (grade 1   diastolic dysfunction).   Ventricular septum: There was paradoxical motion.   Aortic valve: Transaortic velocity was increased due to valvular stenosis.   There was mild to moderate stenosis.   Mitral valve: There was mild regurgitation.   Tricuspid valve: There was mild regurgitation.     [ ] Stress Test:   < from: NM Nuclear Stress Pharmacologic Multiple (03.10.18 @ 11:33) >  Impression:   1. NORMAL ADENOSINE / REST MYOCARDIAL PERFUSION TOMOGRAPHY, WITH NO   EVIDENCE FOR ISCHEMIA DURING ADENOSINE INFUSION.   2. NORMAL RESTING LEFT VENTRICULAR WALL MOTION AND WALL THICKENING.  3. LEFT VENTRICULAR EJECTION FRACTION OF  53% WHICH IS WITHIN RANGE OF   NORMAL    < end of copied text >

## 2018-03-11 NOTE — PROGRESS NOTE ADULT - SUBJECTIVE AND OBJECTIVE BOX
Feels better today ,still very unsteady on feet says fell several times this week. Ambulates with a cane. H/O cerebellar infarct in the past..Blood sugars poor. Non compliant with diet and insulin. Blood sugars often times high. Need to r/o recurrent CVA heart regular chest clear. Anxiety levels better.    PLAN. 1. Bilateral carotid doppler. CT brain without contrast, Monitor blood sugars , HBAIC and TSH, with lipids.

## 2018-03-12 ENCOUNTER — TRANSCRIPTION ENCOUNTER (OUTPATIENT)
Age: 83
End: 2018-03-12

## 2018-03-12 LAB — TSH SERPL-MCNC: 1.02 UIU/ML — SIGNIFICANT CHANGE UP (ref 0.27–4.2)

## 2018-03-12 RX ADMIN — SIMVASTATIN 40 MILLIGRAM(S): 20 TABLET, FILM COATED ORAL at 21:08

## 2018-03-12 RX ADMIN — CLOPIDOGREL BISULFATE 75 MILLIGRAM(S): 75 TABLET, FILM COATED ORAL at 11:08

## 2018-03-12 RX ADMIN — Medication 12 UNIT(S): at 17:32

## 2018-03-12 RX ADMIN — Medication 81 MILLIGRAM(S): at 11:08

## 2018-03-12 RX ADMIN — Medication 25 MILLIGRAM(S): at 17:32

## 2018-03-12 RX ADMIN — Medication 0.5 MILLIGRAM(S): at 21:11

## 2018-03-12 RX ADMIN — GABAPENTIN 300 MILLIGRAM(S): 400 CAPSULE ORAL at 13:09

## 2018-03-12 RX ADMIN — ENOXAPARIN SODIUM 40 MILLIGRAM(S): 100 INJECTION SUBCUTANEOUS at 05:31

## 2018-03-12 RX ADMIN — Medication 12 UNIT(S): at 12:41

## 2018-03-12 RX ADMIN — Medication 40 MILLIGRAM(S): at 13:09

## 2018-03-12 RX ADMIN — AMLODIPINE BESYLATE 5 MILLIGRAM(S): 2.5 TABLET ORAL at 05:33

## 2018-03-12 RX ADMIN — Medication 25 MILLIGRAM(S): at 05:33

## 2018-03-12 RX ADMIN — INSULIN GLARGINE 34 UNIT(S): 100 INJECTION, SOLUTION SUBCUTANEOUS at 21:08

## 2018-03-12 RX ADMIN — GABAPENTIN 300 MILLIGRAM(S): 400 CAPSULE ORAL at 21:08

## 2018-03-12 RX ADMIN — Medication 112 MICROGRAM(S): at 05:32

## 2018-03-12 RX ADMIN — Medication 12.5 MILLIGRAM(S): at 11:08

## 2018-03-12 RX ADMIN — GABAPENTIN 300 MILLIGRAM(S): 400 CAPSULE ORAL at 05:32

## 2018-03-12 RX ADMIN — SPIRONOLACTONE 25 MILLIGRAM(S): 25 TABLET, FILM COATED ORAL at 05:33

## 2018-03-12 RX ADMIN — Medication 12 UNIT(S): at 08:32

## 2018-03-12 NOTE — DISCHARGE NOTE ADULT - CARE PROVIDER_API CALL
Divya Du), EndocrinologyMetabDiabetes; Internal Medicine  [address] [place] [address]  Phone: [phone]  Fax: [phone]

## 2018-03-12 NOTE — CONSULT NOTE ADULT - ATTENDING COMMENTS
Patient seen and examined and agree with above except as noted.  Patient had episode when waking up to use bathroom of dizziness and fall.  Patient states she has been having frequent diarrhea and couldn't wait for nurse to help her to the bathroom.  She decided to get up quickly to get to bathroom before she had diarrhea in her bed.  She felt lightheaded and then fell to the floor.  She has had episodes of lightheadedness when getting up quickly in past.    Exam  A+Ox3 naming and repetition normal  CN 2-12 normal  power in UE 5/5; in LE power difficult to assess secondary to pain/discomfort  Sensory symmetric in UE  FTN NL  Gait deferred     Patient with episode of lightheadedness in setting of frequent diarrhea.  Likely vasovagal episode no suggestion of neurological episode.  1. Check orthstatic vital  2. No further neuro workup  3. Call for any questions or changes Patient seen and examined and agree with above except as noted.  Patients prior imaging reviewed and old left cerebellar infarct with some hemorrhagic products were seen.  Patient has had unsteadiness on and off since stroke a few years ago.  She has also had multiple falls.    Exam   A+Ox3 CN 2-12 normal  power 5/5  Sensory symmetric to PP, Temp, Vib  FTN normal on right and slightly abnormal on left  DTR trace in UE and 0 in LE  Gait deferred    Patient with known cerebellar stroke with multiple episodes of fall and dizziness.  Likely related to old cerebellar stroke and worsened with an concurrent medical conditions.    1. PT/OT/Rehab  2. Can repeat CTH however if unchanged would not recommened any further neuro workup  3. Call with questions or for changes

## 2018-03-12 NOTE — DISCHARGE NOTE ADULT - PHYSICIAN SECTION COMPLETE
[facility]    [address] [place] [address]    [place] [address]    Phone:  [phone]                                       Carin Shea   MRN: [id number]    Department:  [facility]   Date of Visit:  2/2/2018           Patient Information     Date Of Birth          7/17/1968        Your diagnoses for this visit were:     Cervical lymphadenopathy       Follow-up Information     Schedule an appointment as soon as possible for a visit with Anne Sarah MD.    Specialty:  Otolaryngology    Contact information:    [facility]  [address]  [place] [address]  [phone]          Discharge Instructions         Lymphadenopathy  Lymphadenopathy is swelling of the lymph nodes. Lymph nodes are small, bean-shaped glands around the body.  What are lymph nodes?  Lymph nodes are part of your immune system. These glands are found in your neck, over your clavicle, armpits, groin, chest, and abdomen. They act as filters for lymph fluid as it flows through your body. Lymph fluid contains white blood cells (lymphocytes) that help the body fight infection and disease.   Why lymph nodes swell  Lymphadenopathy is very common. The glands often enlarge during a viral or bacterial infection. It can happen during a cold, the flu, or strep throat. The nodes may swell in just one area of the body, such as the neck (localized). Or nodes may swell all over the body (generalized). The neck (cervical) lymph nodes are the most common site of lymphadenopathy.  What causes lymphadenopathy?  Dead cells and fluid build up in the lymph nodes as they help fight infection or disease. This causes them to swell in size. Enlarged lymph nodes are often near the source of infection. This can help to find the cause of an infection. For example, swollen lymph nodes around the jaw may be because of an infection in the teeth or mouth. But lymphadenopathy may also be generalized. This is common in some  viral illnesses such as infectious mononucleosis or chickenpox (varicella).  Lymphadenopathy can also be caused by:    Infection of a lymph node or small group of nodes (lymphadenitis)    Cancer    Reactions to medicines such as antibiotics and certain blood pressure, gout, and seizure medicines    Other health conditions, such as HIV infection, lupus, or sarcoidosis  Symptoms of lymphadenopathy  Lymphadenopathy can cause symptoms such as:    Lumps under the jaw, on the sides or back of the neck, in the armpits, in the groin, or in the chest or belly (abdomen)    Pain or tenderness in any of these areas    Redness or warmth in any of these areas  You may also have symptoms from an infection causing the swollen glands. These symptoms may include fever, sore throat, body aches, or cough.  Diagnosing lymphadenopathy  Your healthcare provider will ask about your health history and symptoms. He or she will give you a physical exam and check the areas where lymph nodes are enlarged. Your healthcare provider will check the size and location of the nodes, and ask how long they have been swollen and if they are painful. Diagnostic tests and referral to specialists may be recommended. They may include:    Blood tests. These are done to check for signs of infection and other problems.    Urine test. This is also done to check for infection and other problems.    Chest X-ray, ultrasound, CT scan, or MRI scans. These tests can show enlarged lymph nodes or other problems.    Lymph node biopsy. If lymph nodes are swollen for 3 to 4 weeks, they may be checked with a biopsy. Small samples of lymph node tissue are taken and checked in a lab for signs of cancer. You may be referred to a specialist in blood disorders and cancer (hematologist and oncologist).  Treatment for lymphadenopathy  The treatment of enlarged lymph nodes depends on the cause. Enlarged lymph nodes are often harmless and go away without any treatment. Treatment is  most often done on the cause of the enlarged nodes and may include:    Antibiotic medicine to treat a bacterial infection    Incision and drainage of a lymph node for lymphadenitis    Other medicines or procedures to treat the cause of the enlarged nodes  You may need follow-up exam in 3 to 4 weeks to recheck enlarged nodes.     When to call your healthcare provider  Call your healthcare provider if you have lymph nodes that are still swollen after 3 to 4 weeks, or as directed by your healthcare provider.   Date Last Reviewed: 5/1/2017 2000-2017 [employer]. [address] [place] [address]. All rights reserved. This information is not intended as a substitute for professional medical care. Always follow your healthcare professional's instructions.             Review of your medicines      START taking        Dose / Directions Last dose taken    azithromycin 250 MG tablet   Commonly known as:  ZITHROMAX Z-CARLOS   Quantity:  6 tablet   Start taking on:  2/3/2018        Two tablets on the first day, then one tablet daily for the next 4 days   Refills:  0          Our records show that you are taking the medicines listed below. If these are incorrect, please call your family doctor or clinic.        Dose / Directions Last dose taken    atorvastatin 10 MG tablet   Commonly known as:  LIPITOR   Dose:  10 mg   Quantity:  30 tablet        Take 1 tablet (10 mg) by mouth daily   Refills:  1        blood glucose lancing device   Quantity:  1 each        Check blood glucose bid   Refills:  0        blood glucose monitoring test strip   Commonly known as:  no brand specified   Quantity:  180 strip        Check blood glucose bid   Refills:  3        cholecalciferol 5000 UNITS Caps capsule   Commonly known as:  vitamin D3   Dose:  5000 Units   Quantity:  30 capsule        Take 1 capsule (5,000 Units) by mouth daily   Refills:  5        CLONAZEPAM PO   Dose:  1 mg        Take 1 mg by mouth At Bedtime  "  Refills:  0        COPAXONE 20 MG/ML injection   Dose:  20 mg   Generic drug:  glatiramer        Inject 20 mg Subcutaneous daily   Refills:  0        potassium chloride SA 20 MEQ CR tablet   Commonly known as:  KLOR-CON   Dose:  20 mEq   Quantity:  30 tablet        Take 1 tablet (20 mEq) by mouth daily   Refills:  0        sitagliptin-metFORMIN  MG per tablet   Commonly known as:  JANUMET   Dose:  1 tablet        Take 1 tablet by mouth once   Refills:  0                Prescriptions were sent or printed at these locations (1 Prescription)                   [facility] - [address]   [address] [place] [address]    Telephone:  [phone]   Fax:  [phone]   Hours:                  Printed at Department/Unit printer (1 of 1)         azithromycin (ZITHROMAX Z-CARLOS) 250 MG tablet                Orders Needing Specimen Collection     None      Pending Results     No orders found from 1/31/2018 to 2/3/2018.            Pending Culture Results     No orders found from 1/31/2018 to 2/3/2018.            Thank you for choosing [facility]       Thank you for choosing [facility] for your care. Our goal is always to provide you with excellent care. Hearing back from our patients is one way we can continue to improve our services. Please take a few minutes to complete the written survey that you may receive in the mail after you visit with us. Thank you!        [employer] Information     [employer] lets you send messages to your doctor, view your test results, renew your prescriptions, schedule appointments and more. To sign up, go to [email or web address] . Click on \"Log in\" on the left side of the screen, which will take you to the Welcome page. Then click on \"Sign up Now\" on the right side of the page.     You will be asked to enter the access code listed below, as well as some personal information. Please follow the directions to create your username and password.     Your access " code is: [email or web address]  Expires: 5/3/2018  6:42 AM     Your access code will  in 90 days. If you need help or a new code, please call your [facility] clinic or [phone].        Care EveryWhere ID     This is your Care EveryWhere ID. This could be used by other organizations to access your [facility] medical records  [id number]        Equal Access to Services     ZACARIAS SOSA : Hadii janak israel hadasho Soomaali, waaxda luqadaha, qaybta kaalmada adeegyada, jaren borja . So [facility] [phone].    ATENCIÓN: Si habla deepa, tiene a quinn disposición servicios gratuitos de asistencia lingüística. Llame al [phone].    We comply with applicable federal civil rights laws and [place] laws. We do not discriminate on the basis of race, color, national origin, age, disability, sex, sexual orientation, or gender identity.            After Visit Summary       This is your record. Keep this with you and show to your community pharmacist(s) and doctor(s) at your next visit.                   Yes

## 2018-03-12 NOTE — DISCHARGE NOTE ADULT - HOSPITAL COURSE
81 yo F with PMHx of anxiety/ panic disorder, DM II, DLD, HTN, CVA with no residual weakness, AAA, brain aneurysm presents for 5-6 days of palpitations, SOB, CP and worsening anxiety. 81 yo F with PMHx of anxiety/ panic disorder, DM II, DLD, HTN, CVA with no residual weakness, AAA, brain aneurysm presents for 5-6 days of palpitations, SOB, CP and worsening anxiety.1. Palpitations, SOB, CP, and increasing anxiety    - nuclear stress test: no evidence of ischemia during adenosine infusion  - 2D echo (3/13/18): LV EF 67%; G1DD; moderate AS  - psychiatry evaluation: follow-up as outpatient  - cardiology consult: r/o tachyarrhythmia; consider outpatient Holter monitor    2. Unsteady gait  - orthostatic vital signs wnl  - carotid duplex: 20-39% stenosis of right and left ICA  - neurology consult: likely related to old cerebellar stroke and worsened by current medical condition; consider repeat CT head  - CT head pending  - continue PT/rehab    3. HTN, DM, CVA  - continue current medications  - monitor BP an FS    4. Disposition  - anticipate discharge to SNF once medically cleared

## 2018-03-12 NOTE — CONSULT NOTE ADULT - ASSESSMENT
A 82-year-old female with a PMH significant for CVA/TIA and vertigo who presents with chronic gait unsteadiness associated with multiple mechanical falls.    1. Gait unsteadiness  - continue PT/rehab  - further recommendations provided by attending

## 2018-03-12 NOTE — PHYSICAL THERAPY INITIAL EVALUATION ADULT - PATIENT/FAMILY/SIGNIFICANT OTHER GOALS STATEMENT, PT EVAL
81 yo F with PMHx of anxiety/ panic disorder, DM II, DLD, HTN, CVA with no residual weakness, AAA, brain aneurysm presents for 5-6 days of palpitations, SOB, CP and worsening anxiety.

## 2018-03-12 NOTE — PROGRESS NOTE ADULT - SUBJECTIVE AND OBJECTIVE BOX
SUBJECTIVE:    Patient is a 82y old Female who presents with a chief complaint of Palpitations, SOB, CP and worsened anxiety X 5 days (10 Mar 2018 16:41)    Currently admitted to medicine with the primary diagnosis of Difficulty walking     Today is hospital day 2d. This morning she is resting comfortably in bed and reports no new issues or overnight events.     PAST MEDICAL & SURGICAL HISTORY  Vertigo  Hypothyroid  Brain aneurysm  AAA (abdominal aortic aneurysm)  HLD (hyperlipidemia)  HTN (hypertension)  Depression  Anxiety  Panic disorder  DM (diabetes mellitus)  CVA (cerebral vascular accident)  H/O: hysterectomy  Status post cataract surgery    SOCIAL HISTORY:  Negative for smoking/alcohol/drug use.     ALLERGIES:  codeine (Nausea)  IV Contrast (Anaphylaxis)  penicillin (Hives)  shellfish (Unknown)    MEDICATIONS:  STANDING MEDICATIONS  amLODIPine   Tablet 5 milliGRAM(s) Oral daily  aspirin  chewable 81 milliGRAM(s) Oral daily  clopidogrel Tablet 75 milliGRAM(s) Oral daily  enoxaparin Injectable 40 milliGRAM(s) SubCutaneous every 24 hours  gabapentin 300 milliGRAM(s) Oral three times a day  insulin glargine Injectable (LANTUS) 34 Unit(s) SubCutaneous at bedtime  insulin lispro Injectable (HumaLOG) 12 Unit(s) SubCutaneous three times a day before meals  levothyroxine 112 MICROGram(s) Oral daily  LORazepam     Tablet 0.5 milliGRAM(s) Oral at bedtime  meclizine 12.5 milliGRAM(s) Oral daily  metoprolol     tartrate 25 milliGRAM(s) Oral two times a day  PARoxetine 40 milliGRAM(s) Oral daily  simvastatin 40 milliGRAM(s) Oral at bedtime  spironolactone 25 milliGRAM(s) Oral daily    PRN MEDICATIONS    VITALS:   T(F): 97.3  HR: 66  BP: 128/73  RR: 18  SpO2: 94%    LABS:            Urinalysis Basic - ( 11 Mar 2018 07:55 )    Color: Yellow / Appearance: Clear / S.015 / pH: x  Gluc: x / Ketone: Negative  / Bili: Negative / Urobili: 1.0 mg/dL   Blood: x / Protein: Negative mg/dL / Nitrite: Negative   Leuk Esterase: Negative / RBC: x / WBC x   Sq Epi: x / Non Sq Epi: x / Bacteria: x                RADIOLOGY:    PHYSICAL EXAM:  GEN: No acute distress  LUNGS: Clear to auscultation bilaterally   HEART: S1/S2 present. RRR.   ABD: Soft, non-tender, non-distended. Bowel sounds present  NEURO: AAOX3

## 2018-03-12 NOTE — PROGRESS NOTE ADULT - SUBJECTIVE AND OBJECTIVE BOX
No new change, Alert and oriented. .Euthyroid heart regular chest clear.Will need rehab fu and perhaps short term rehab. Blood sugars labile. Close monitoring advised.

## 2018-03-12 NOTE — DISCHARGE NOTE ADULT - MEDICATION SUMMARY - MEDICATIONS TO TAKE
I will START or STAY ON the medications listed below when I get home from the hospital:    spironolactone 25 mg oral tablet  -- orally once a day  -- Indication: For HTN (hypertension)    Aspir 81  -- orally once a day  -- Indication: For Cad    Ativan 0.5 mg oral tablet  -- orally once a day (at bedtime)  -- Indication: For Anxiety    gabapentin 300 mg oral capsule  -- orally 3 times a day  -- Indication: For Pain    PARoxetine 40 mg oral tablet  -- 1 tab(s) by mouth once a day  -- Indication: For Panic attacks    HumaLOG 100 units/mL subcutaneous solution  -- with meals 12 units  -- Indication: For DIabetes    Levemir 100 units/mL subcutaneous solution  -- 34 unit(s) subcutaneous once a day (at bedtime)  -- Indication: For DIabetes    meclizine 12.5 mg oral tablet  -- 1 tab(s) by mouth once a day  -- Indication: For Vertigo    simvastatin 40 mg oral tablet  -- 1 tab(s) by mouth once a day (at bedtime)  -- Indication: For HLD (hyperlipidemia)    Plavix 75 mg oral tablet  -- 1 tab(s) by mouth once a day  -- Indication: For Cad    metoprolol tartrate 25 mg oral tablet  -- 1 tab(s) by mouth 2 times a day  -- Indication: For HTN (hypertension)    amLODIPine 5 mg oral tablet  -- 1 tab(s) by mouth once a day  -- Indication: For HTN (hypertension)    levothyroxine 112 mcg (0.112 mg) oral tablet  -- 1 tab(s) by mouth once a day  -- Indication: For thyroid

## 2018-03-12 NOTE — CONSULT NOTE ADULT - SUBJECTIVE AND OBJECTIVE BOX
HPI:  The patient is a 82-year-old female with a PMH as listed below who presented with a chief complaint of palpitations, SOB, CP and worsening anxiety for 5-6 days. In addition, the patient reported experiencing dizziness described as a spinning sensation and intermittent. She reported to having an unsteady gait and had several mechanical falls in the previous week.    83 yo F with PMHx of anxiety/ panic disorder, DM II, DLD, HTN, CVA with no residual weakness, AAA, brain aneurysm presents for 5-6 days of  Pt also complained of dizziness that felt like spinning sensation that comes and goes. CP is substernal, non-radiating, pressure like, and goes away when anxiety episode subsides. Pt reports that theses palpitation episodes happened multiple times per day over the last 5 days. Symptoms are similar to her previous anxiety episodes and they are usually relieved with small dose of ativan. However, these symptoms did not go away with ativan the day of presentation and pt decided to come to the ED. Per ED, pt was found to have new LBBB on EKG in the ED and pt was send for cardiac stress test which was negative. (LBBB is not new, seen in previous EKGs)  Pt denies fever, chills, abdominal pain, N/V/D. Denies pain on urination but admits for urinary frequency.    Pt's daughter reports that pt has been having unsteady gait recently and per ED, pt did have a wobbly/unsteady gait. At baseline, pt walks with a rolling walker. (10 Mar 2018 16:41)    PAST MEDICAL & SURGICAL HISTORY:  Vertigo  Hypothyroid  Brain aneurysm  AAA (abdominal aortic aneurysm)  HLD (hyperlipidemia)  HTN (hypertension)  Depression  Anxiety  Panic disorder  DM (diabetes mellitus)  CVA (cerebral vascular accident)  H/O: hysterectomy  Status post cataract surgery    MEDICATIONS  (STANDING):  amLODIPine   Tablet 5 milliGRAM(s) Oral daily  aspirin  chewable 81 milliGRAM(s) Oral daily  clopidogrel Tablet 75 milliGRAM(s) Oral daily  enoxaparin Injectable 40 milliGRAM(s) SubCutaneous every 24 hours  gabapentin 300 milliGRAM(s) Oral three times a day  insulin glargine Injectable (LANTUS) 34 Unit(s) SubCutaneous at bedtime  insulin lispro Injectable (HumaLOG) 12 Unit(s) SubCutaneous three times a day before meals  levothyroxine 112 MICROGram(s) Oral daily  LORazepam     Tablet 0.5 milliGRAM(s) Oral at bedtime  meclizine 12.5 milliGRAM(s) Oral daily  metoprolol     tartrate 25 milliGRAM(s) Oral two times a day  PARoxetine 40 milliGRAM(s) Oral daily  simvastatin 40 milliGRAM(s) Oral at bedtime  spironolactone 25 milliGRAM(s) Oral daily    Allergies:  codeine (Nausea)  IV Contrast (Anaphylaxis)  penicillin (Hives)  shellfish (Unknown)    SOCIAL HISTORY:    FAMILY HISTORY:  Family history of breast cancer (Sibling)  Family history of prostate cancer in father (Father)    Vital Signs Last 24 Hrs  T(C): 36.3 (12 Mar 2018 05:37), Max: 36.7 (11 Mar 2018 21:12)  T(F): 97.3 (12 Mar 2018 05:37), Max: 98.1 (11 Mar 2018 21:12)  HR: 66 (12 Mar 2018 05:37) (65 - 72)  BP: 128/73 (12 Mar 2018 05:37) (128/73 - 185/82)  BP(mean): --  RR: 18 (12 Mar 2018 05:37) (18 - 18)  SpO2: 94% (12 Mar 2018 09:00) (94% - 95%)    PHYSICAL EXAM:    Constitutional:    Eyes:    ENMT:    Neck:    Breasts:    Back:    Respiratory:    Cardiovascular:    Gastrointestinal:    Genitourinary:    Rectal:    Extremities:    Vascular:    Neurological:    Skin:    Lymph Nodes:    Musculoskeletal:    Psychiatric:      LABS:  Urinalysis Basic - ( 11 Mar 2018 07:55 )    Color: Yellow / Appearance: Clear / S.015 / pH: x  Gluc: x / Ketone: Negative  / Bili: Negative / Urobili: 1.0 mg/dL   Blood: x / Protein: Negative mg/dL / Nitrite: Negative   Leuk Esterase: Negative / RBC: x / WBC x   Sq Epi: x / Non Sq Epi: x / Bacteria: x        RADIOLOGY & ADDITIONAL STUDIES: HPI:  The patient is a 82-year-old female with a PMH as listed below who presented with a chief complaint of palpitations, SOB, CP and worsening anxiety for 5-6 days. In addition, the patient reported experiencing dizziness described as a spinning sensation and intermittent. She reported to having an unsteady gait and had several mechanical falls in the previous week.      PAST MEDICAL & SURGICAL HISTORY:  Vertigo  Hypothyroid  Brain aneurysm  AAA (abdominal aortic aneurysm)  HLD (hyperlipidemia)  HTN (hypertension)  Depression  Anxiety  Panic disorder  DM (diabetes mellitus)  CVA (cerebral vascular accident)  H/O: hysterectomy  Status post cataract surgery    MEDICATIONS  (STANDING):  amLODIPine   Tablet 5 milliGRAM(s) Oral daily  aspirin  chewable 81 milliGRAM(s) Oral daily  clopidogrel Tablet 75 milliGRAM(s) Oral daily  enoxaparin Injectable 40 milliGRAM(s) SubCutaneous every 24 hours  gabapentin 300 milliGRAM(s) Oral three times a day  insulin glargine Injectable (LANTUS) 34 Unit(s) SubCutaneous at bedtime  insulin lispro Injectable (HumaLOG) 12 Unit(s) SubCutaneous three times a day before meals  levothyroxine 112 MICROGram(s) Oral daily  LORazepam     Tablet 0.5 milliGRAM(s) Oral at bedtime  meclizine 12.5 milliGRAM(s) Oral daily  metoprolol     tartrate 25 milliGRAM(s) Oral two times a day  PARoxetine 40 milliGRAM(s) Oral daily  simvastatin 40 milliGRAM(s) Oral at bedtime  spironolactone 25 milliGRAM(s) Oral daily    Allergies:  codeine (Nausea)  IV Contrast (Anaphylaxis)  penicillin (Hives)  shellfish (Unknown)    SOCIAL HISTORY:    FAMILY HISTORY:  Family history of breast cancer (Sibling)  Family history of prostate cancer in father (Father)    Vital Signs Last 24 Hrs  T(C): 36.3 (12 Mar 2018 05:37), Max: 36.7 (11 Mar 2018 21:12)  T(F): 97.3 (12 Mar 2018 05:37), Max: 98.1 (11 Mar 2018 21:12)  HR: 66 (12 Mar 2018 05:37) (65 - 72)  BP: 128/73 (12 Mar 2018 05:37) (128/73 - 185/82)  BP(mean): --  RR: 18 (12 Mar 2018 05:37) (18 - 18)  SpO2: 94% (12 Mar 2018 09:00) (94% - 95%)    PHYSICAL EXAM:    Constitutional:    HEENT:    Respiratory:    Cardiovascular:    Gastrointestinal:    Neurological:      LABS:  Urinalysis Basic - ( 11 Mar 2018 07:55 )    Color: Yellow / Appearance: Clear / S.015 / pH: x  Gluc: x / Ketone: Negative  / Bili: Negative / Urobili: 1.0 mg/dL   Blood: x / Protein: Negative mg/dL / Nitrite: Negative   Leuk Esterase: Negative / RBC: x / WBC x   Sq Epi: x / Non Sq Epi: x / Bacteria: x    RADIOLOGY & ADDITIONAL STUDIES:  CXR (3/9/18): No radiographic evidence of acute cardiopulmonary disease.    Nuclear stress test (3/10/18): Normal adenosine/rest myocardial perfusion tomography, w/ no evidence for ischemia during adenosine infusion HPI:  The patient is a 82-year-old female with a PMH as listed below who presented with a chief complaint of palpitations, SOB, CP and worsening anxiety for 5-6 days. In addition, the patient reported experiencing dizziness described as a spinning sensation and intermittent. The patient has a previous history of CVA/TIA and has reported to having an unsteady gait and had several mechanical falls since then. She also states that her history of anxiety also makes her feel unsteady when walking. She denies having prodromal symptoms but states that her body feels like its moving side-to-side when trying to walk.    PAST MEDICAL & SURGICAL HISTORY:  Vertigo  Hypothyroid  Brain aneurysm  AAA (abdominal aortic aneurysm)  HLD (hyperlipidemia)  HTN (hypertension)  Depression  Anxiety  Panic disorder  DM (diabetes mellitus)  CVA (cerebral vascular accident)  H/O: hysterectomy  Status post cataract surgery    MEDICATIONS  (STANDING):  amLODIPine   Tablet 5 milliGRAM(s) Oral daily  aspirin  chewable 81 milliGRAM(s) Oral daily  clopidogrel Tablet 75 milliGRAM(s) Oral daily  enoxaparin Injectable 40 milliGRAM(s) SubCutaneous every 24 hours  gabapentin 300 milliGRAM(s) Oral three times a day  insulin glargine Injectable (LANTUS) 34 Unit(s) SubCutaneous at bedtime  insulin lispro Injectable (HumaLOG) 12 Unit(s) SubCutaneous three times a day before meals  levothyroxine 112 MICROGram(s) Oral daily  LORazepam     Tablet 0.5 milliGRAM(s) Oral at bedtime  meclizine 12.5 milliGRAM(s) Oral daily  metoprolol     tartrate 25 milliGRAM(s) Oral two times a day  PARoxetine 40 milliGRAM(s) Oral daily  simvastatin 40 milliGRAM(s) Oral at bedtime  spironolactone 25 milliGRAM(s) Oral daily    Allergies:  codeine (Nausea)  IV Contrast (Anaphylaxis)  penicillin (Hives)  shellfish (Unknown)    FAMILY HISTORY:  Family history of breast cancer (Sibling)  Family history of prostate cancer in father (Father)    Vital Signs Last 24 Hrs  T(C): 36.3 (12 Mar 2018 05:37), Max: 36.7 (11 Mar 2018 21:12)  T(F): 97.3 (12 Mar 2018 05:37), Max: 98.1 (11 Mar 2018 21:12)  HR: 66 (12 Mar 2018 05:37) (65 - 72)  BP: 128/73 (12 Mar 2018 05:37) (128/73 - 185/82)  BP(mean): --  RR: 18 (12 Mar 2018 05:37) (18 - 18)  SpO2: 94% (12 Mar 2018 09:00) (94% - 95%)    PHYSICAL EXAM:  Constitutional: Alert, awake and in no acute distress.    HEENT: normocephalic    Neurological: AAO x3; no deficits noted on CN II-XII exam; 5/5 strength in bilateral upper and lower extremity; sensation intact in bilateral upper and lower extremity      LABS:  Urinalysis Basic - ( 11 Mar 2018 07:55 )    Color: Yellow / Appearance: Clear / S.015 / pH: x  Gluc: x / Ketone: Negative  / Bili: Negative / Urobili: 1.0 mg/dL   Blood: x / Protein: Negative mg/dL / Nitrite: Negative   Leuk Esterase: Negative / RBC: x / WBC x   Sq Epi: x / Non Sq Epi: x / Bacteria: x    RADIOLOGY & ADDITIONAL STUDIES:  CXR (3/9/18): No radiographic evidence of acute cardiopulmonary disease.    Nuclear stress test (3/10/18): Normal adenosine/rest myocardial perfusion tomography, w/ no evidence for ischemia during adenosine infusion

## 2018-03-12 NOTE — DISCHARGE NOTE ADULT - PATIENT PORTAL LINK FT
You can access the [facility] Patient Portal, offered by [facility], by registering with the following website: [email or web address]

## 2018-03-12 NOTE — PHYSICAL THERAPY INITIAL EVALUATION ADULT - GAIT DEVIATIONS NOTED, PT EVAL
decreased step length/decreased stride length/decreased alfredito/Decreased speed,decreased heel strike,mildly increased JODI.

## 2018-03-12 NOTE — DISCHARGE NOTE ADULT - CARE PLAN
Principal Discharge DX:	Panic disorder  Goal:	stable  Assessment and plan of treatment:	fu with pmd in 1-2 weeks  Secondary Diagnosis:	Dizziness  Goal:	stable  Assessment and plan of treatment:	c/w meds as prescribed

## 2018-03-12 NOTE — PROGRESS NOTE ADULT - ASSESSMENT
81 yo F with PMHx of anxiety/ panic disorder, DM II, DLD, HTN, CVA with no residual weakness, AAA, brain aneurysm presents for 5-6 days of palpitations, SOB, CP and worsening anxiety.    # palpitaitons/CP/SOB likely 2/2 anxiety episodes. currently asymptomatic   - seen by psych in ED: no needs for inpatient psych, f/u as outpatient for titration of Paxil  - cardio would like a repeat 2d echo eval for valvulopathy, chech ortho statics if pos d/c amlodipine, if better htn control needed should consider adding ace arn .   - c/w Paxil 40mg qD and ativan 0.5mg qHS  - fu b/c carotid duplex   - will check oprthostatics, placed order today       # LBBB on EKG (LBBB NOT new)  - 2 set CE negative  - cardiac stress test negative    # dizziness (spinning sensation) likely 2/2 vertigo  - c/w home med (meclizine 12.5 mg qD). consider increasing frequency if dizziness persists.  - fu neuro consult as per dr. sanchez     # unsteady gait  - Pt/ Rehab    # HTN  - c/w home meds    # DMII  - f/u FS and insulin SS  - Hba1c    # h/o CVA  - c/w Plavix, ASA    # dvt ppx and no need for GI ppx  Dispo: Pt rehab ordered

## 2018-03-13 LAB
-  AMIKACIN: SIGNIFICANT CHANGE UP
-  AMPICILLIN/SULBACTAM: SIGNIFICANT CHANGE UP
-  AMPICILLIN: SIGNIFICANT CHANGE UP
-  AZTREONAM: SIGNIFICANT CHANGE UP
-  CEFAZOLIN: SIGNIFICANT CHANGE UP
-  CEFEPIME: SIGNIFICANT CHANGE UP
-  CEFOXITIN: SIGNIFICANT CHANGE UP
-  CEFTAZIDIME: SIGNIFICANT CHANGE UP
-  CEFTRIAXONE: SIGNIFICANT CHANGE UP
-  CIPROFLOXACIN: SIGNIFICANT CHANGE UP
-  ERTAPENEM: SIGNIFICANT CHANGE UP
-  GENTAMICIN: SIGNIFICANT CHANGE UP
-  IMIPENEM: SIGNIFICANT CHANGE UP
-  LEVOFLOXACIN: SIGNIFICANT CHANGE UP
-  MEROPENEM: SIGNIFICANT CHANGE UP
-  NITROFURANTOIN: SIGNIFICANT CHANGE UP
-  PIPERACILLIN/TAZOBACTAM: SIGNIFICANT CHANGE UP
-  TOBRAMYCIN: SIGNIFICANT CHANGE UP
-  TRIMETHOPRIM/SULFAMETHOXAZOLE: SIGNIFICANT CHANGE UP
ANION GAP SERPL CALC-SCNC: 13 MMOL/L — SIGNIFICANT CHANGE UP (ref 7–14)
BUN SERPL-MCNC: 18 MG/DL — SIGNIFICANT CHANGE UP (ref 10–20)
CALCIUM SERPL-MCNC: 9.4 MG/DL — SIGNIFICANT CHANGE UP (ref 8.5–10.1)
CHLORIDE SERPL-SCNC: 99 MMOL/L — SIGNIFICANT CHANGE UP (ref 98–110)
CO2 SERPL-SCNC: 24 MMOL/L — SIGNIFICANT CHANGE UP (ref 17–32)
CREAT SERPL-MCNC: 0.8 MG/DL — SIGNIFICANT CHANGE UP (ref 0.7–1.5)
CULTURE RESULTS: SIGNIFICANT CHANGE UP
GLUCOSE SERPL-MCNC: 293 MG/DL — HIGH (ref 70–110)
HCT VFR BLD CALC: 35.9 % — LOW (ref 37–47)
HGB BLD-MCNC: 11.4 G/DL — LOW (ref 12–16)
MCHC RBC-ENTMCNC: 24.7 PG — LOW (ref 27–31)
MCHC RBC-ENTMCNC: 31.8 G/DL — LOW (ref 32–37)
MCV RBC AUTO: 77.7 FL — LOW (ref 81–99)
METHOD TYPE: SIGNIFICANT CHANGE UP
NRBC # BLD: 0 /100 WBCS — SIGNIFICANT CHANGE UP (ref 0–0)
ORGANISM # SPEC MICROSCOPIC CNT: SIGNIFICANT CHANGE UP
ORGANISM # SPEC MICROSCOPIC CNT: SIGNIFICANT CHANGE UP
PLATELET # BLD AUTO: 338 K/UL — SIGNIFICANT CHANGE UP (ref 130–400)
POTASSIUM SERPL-MCNC: 4.8 MMOL/L — SIGNIFICANT CHANGE UP (ref 3.5–5)
POTASSIUM SERPL-SCNC: 4.8 MMOL/L — SIGNIFICANT CHANGE UP (ref 3.5–5)
RBC # BLD: 4.62 M/UL — SIGNIFICANT CHANGE UP (ref 4.2–5.4)
RBC # FLD: 14.6 % — HIGH (ref 11.5–14.5)
SODIUM SERPL-SCNC: 136 MMOL/L — SIGNIFICANT CHANGE UP (ref 135–146)
SPECIMEN SOURCE: SIGNIFICANT CHANGE UP
WBC # BLD: 11.22 K/UL — HIGH (ref 4.8–10.8)
WBC # FLD AUTO: 11.22 K/UL — HIGH (ref 4.8–10.8)

## 2018-03-13 RX ORDER — ATORVASTATIN CALCIUM 80 MG/1
20 TABLET, FILM COATED ORAL AT BEDTIME
Qty: 0 | Refills: 0 | Status: DISCONTINUED | OUTPATIENT
Start: 2018-03-13 | End: 2018-03-14

## 2018-03-13 RX ADMIN — ATORVASTATIN CALCIUM 20 MILLIGRAM(S): 80 TABLET, FILM COATED ORAL at 22:06

## 2018-03-13 RX ADMIN — Medication 25 MILLIGRAM(S): at 17:29

## 2018-03-13 RX ADMIN — AMLODIPINE BESYLATE 5 MILLIGRAM(S): 2.5 TABLET ORAL at 05:51

## 2018-03-13 RX ADMIN — INSULIN GLARGINE 34 UNIT(S): 100 INJECTION, SOLUTION SUBCUTANEOUS at 22:06

## 2018-03-13 RX ADMIN — CLOPIDOGREL BISULFATE 75 MILLIGRAM(S): 75 TABLET, FILM COATED ORAL at 11:27

## 2018-03-13 RX ADMIN — Medication 40 MILLIGRAM(S): at 11:27

## 2018-03-13 RX ADMIN — Medication 12 UNIT(S): at 09:00

## 2018-03-13 RX ADMIN — Medication 81 MILLIGRAM(S): at 11:27

## 2018-03-13 RX ADMIN — Medication 12 UNIT(S): at 12:14

## 2018-03-13 RX ADMIN — Medication 25 MILLIGRAM(S): at 05:51

## 2018-03-13 RX ADMIN — Medication 12.5 MILLIGRAM(S): at 11:27

## 2018-03-13 RX ADMIN — GABAPENTIN 300 MILLIGRAM(S): 400 CAPSULE ORAL at 22:06

## 2018-03-13 RX ADMIN — GABAPENTIN 300 MILLIGRAM(S): 400 CAPSULE ORAL at 05:51

## 2018-03-13 RX ADMIN — GABAPENTIN 300 MILLIGRAM(S): 400 CAPSULE ORAL at 13:37

## 2018-03-13 RX ADMIN — Medication 112 MICROGRAM(S): at 05:51

## 2018-03-13 RX ADMIN — Medication 0.5 MILLIGRAM(S): at 22:06

## 2018-03-13 RX ADMIN — Medication 12 UNIT(S): at 17:29

## 2018-03-13 RX ADMIN — SPIRONOLACTONE 25 MILLIGRAM(S): 25 TABLET, FILM COATED ORAL at 05:50

## 2018-03-13 NOTE — PROGRESS NOTE ADULT - SUBJECTIVE AND OBJECTIVE BOX
Feels better today, No longer dizzy. No chest pain or palpitations. Anxiety level better. Seems to be worse when she is home.  Hypothyroid clinically and biochemically euthyroid.  Diabetes still poor goes in to the 300 mg range pre lunch.   Gait still unsteady. Will benefit with short term rehab.    PLAN. Increase lantus to 40 u at bed tome. Needs a 100 calorie bed time snack.           Rehab consult for short term rehab. Pt is medically stable.

## 2018-03-13 NOTE — PROGRESS NOTE ADULT - SUBJECTIVE AND OBJECTIVE BOX
Patient is a 82y old Female who presents with a chief complaint of Palpitations, SOB, CP and worsened anxiety X 5 days (12 Mar 2018 09:46)    PAST MEDICAL & SURGICAL HISTORY:  Vertigo  Hypothyroid  Brain aneurysm  AAA (abdominal aortic aneurysm)  HLD (hyperlipidemia)  HTN (hypertension)  Depression  Anxiety  Panic disorder  DM (diabetes mellitus)  CVA (cerebral vascular accident)  H/O: hysterectomy  Status post cataract surgery    MEDICATIONS  (STANDING):  amLODIPine   Tablet 5 milliGRAM(s) Oral daily  aspirin  chewable 81 milliGRAM(s) Oral daily  atorvastatin 20 milliGRAM(s) Oral at bedtime  clopidogrel Tablet 75 milliGRAM(s) Oral daily  enoxaparin Injectable 40 milliGRAM(s) SubCutaneous every 24 hours  gabapentin 300 milliGRAM(s) Oral three times a day  insulin glargine Injectable (LANTUS) 34 Unit(s) SubCutaneous at bedtime  insulin lispro Injectable (HumaLOG) 12 Unit(s) SubCutaneous three times a day before meals  levothyroxine 112 MICROGram(s) Oral daily  LORazepam     Tablet 0.5 milliGRAM(s) Oral at bedtime  meclizine 12.5 milliGRAM(s) Oral daily  metoprolol     tartrate 25 milliGRAM(s) Oral two times a day  PARoxetine 40 milliGRAM(s) Oral daily  spironolactone 25 milliGRAM(s) Oral daily    Allergy:  codeine (Nausea)  IV Contrast (Anaphylaxis)  penicillin (Hives)  shellfish (Unknown)    Overnight events: No acute events overnight.    Vital Signs Last 24 Hrs  T(C): 36 (13 Mar 2018 05:06), Max: 37.1 (12 Mar 2018 21:26)  T(F): 96.8 (13 Mar 2018 05:06), Max: 98.8 (12 Mar 2018 21:26)  HR: 63 (13 Mar 2018 05:06) (63 - 69)  BP: 143/65 (13 Mar 2018 05:06) (135/61 - 152/69)  BP(mean): --  RR: 18 (13 Mar 2018 05:06) (18 - 18)  SpO2: 93% (12 Mar 2018 22:53) (93% - 93%)  CAPILLARY BLOOD GLUCOSE  208 (13 Mar 2018 08:57)  264 (12 Mar 2018 21:26)  198 (12 Mar 2018 16:42)  266 (12 Mar 2018 11:54)    CENTRAL LINE: [ ] YES [X] NO     TUBES: None    SANCHEZ: [ ] YES [X] NO        Physical Exam:    -     General :     -      HEENT:    -      Cardiac:    -      Pulm:    -      GI:    -      Musculoskeletal:    -      Neuro:    Labs:              Culture - Urine (collected 11 Mar 2018 07:54)  Source: .Urine Clean Catch (Midstream)  Preliminary Report (12 Mar 2018 19:20):    10,000 - 49,000 CFU/mL Escherichia coli    Normal Urogenital stephani present    Imaging: Patient is a 82y old Female who presents with a chief complaint of Palpitations, SOB, CP and worsened anxiety X 5 days (12 Mar 2018 09:46)    PAST MEDICAL & SURGICAL HISTORY:  Vertigo  Hypothyroid  Brain aneurysm  AAA (abdominal aortic aneurysm)  HLD (hyperlipidemia)  HTN (hypertension)  Depression  Anxiety  Panic disorder  DM (diabetes mellitus)  CVA (cerebral vascular accident)  H/O: hysterectomy  Status post cataract surgery    MEDICATIONS  (STANDING):  amLODIPine   Tablet 5 milliGRAM(s) Oral daily  aspirin  chewable 81 milliGRAM(s) Oral daily  atorvastatin 20 milliGRAM(s) Oral at bedtime  clopidogrel Tablet 75 milliGRAM(s) Oral daily  enoxaparin Injectable 40 milliGRAM(s) SubCutaneous every 24 hours  gabapentin 300 milliGRAM(s) Oral three times a day  insulin glargine Injectable (LANTUS) 34 Unit(s) SubCutaneous at bedtime  insulin lispro Injectable (HumaLOG) 12 Unit(s) SubCutaneous three times a day before meals  levothyroxine 112 MICROGram(s) Oral daily  LORazepam     Tablet 0.5 milliGRAM(s) Oral at bedtime  meclizine 12.5 milliGRAM(s) Oral daily  metoprolol     tartrate 25 milliGRAM(s) Oral two times a day  PARoxetine 40 milliGRAM(s) Oral daily  spironolactone 25 milliGRAM(s) Oral daily    Allergy:  codeine (Nausea)  IV Contrast (Anaphylaxis)  penicillin (Hives)  shellfish (Unknown)    Overnight events: No acute events overnight.    Vital Signs Last 24 Hrs  T(C): 36 (13 Mar 2018 05:06), Max: 37.1 (12 Mar 2018 21:26)  T(F): 96.8 (13 Mar 2018 05:06), Max: 98.8 (12 Mar 2018 21:26)  HR: 63 (13 Mar 2018 05:06) (63 - 69)  BP: 143/65 (13 Mar 2018 05:06) (135/61 - 152/69)  BP(mean): --  RR: 18 (13 Mar 2018 05:06) (18 - 18)  SpO2: 93% (12 Mar 2018 22:53) (93% - 93%)  CAPILLARY BLOOD GLUCOSE  208 (13 Mar 2018 08:57)  264 (12 Mar 2018 21:26)  198 (12 Mar 2018 16:42)  266 (12 Mar 2018 11:54)    CENTRAL LINE: [ ] YES [X] NO     TUBES: None    SANCHEZ: [ ] YES [X] NO        Physical Exam:    -     General : Alert, awake and in no acute distress.    -      Cardiac: RRR    -      Pulm: CTA B/L    -      GI: abdomen soft    Labs:              Culture - Urine (collected 11 Mar 2018 07:54)  Source: .Urine Clean Catch (Midstream)  Preliminary Report (12 Mar 2018 19:20):    10,000 - 49,000 CFU/mL Escherichia coli    Normal Urogenital stephani present

## 2018-03-13 NOTE — PROGRESS NOTE ADULT - ASSESSMENT
Assessment:  A 82y Female who presented with palpitations, SOB, CP, and increasing anxiety for 5 days.    Plan:  1. Palpitations, SOB, CP, and increasing anxiety likely 2/2 generalized anxiety  - nuclear stress test: no evidence of ischemia during adenosine infusion  - psychiatry evaluation: follow-up as outpatient  - cardiology consult: r/o tachyarrhythmia; check 2D echo; consider outpatient Holter monitor, adding ACE/ARB    2. Unsteady gait  - orthostatic vital signs wnl  - carotid duplex: 20-39% stenosis of right and left ICA  - neurology consult: likely related to old cerebellar stroke and worsened by current medical condition; consider repeat CT head  - continue PT/rehab    3. HTN, DM, CVA  - continue current medications  - monitor BP an FS    4. Disposition  - anticipate discharge home once medically cleared Assessment:  A 82y Female who presented with palpitations, SOB, CP, and increasing anxiety for 5 days.    Plan:  1. Palpitations, SOB, CP, and increasing anxiety  - nuclear stress test: no evidence of ischemia during adenosine infusion  - 2D echo (3/13/18): LV EF 67%; G1DD; moderate AS  - psychiatry evaluation: follow-up as outpatient  - cardiology consult: r/o tachyarrhythmia; consider outpatient Holter monitor    2. Unsteady gait  - orthostatic vital signs wnl  - carotid duplex: 20-39% stenosis of right and left ICA  - neurology consult: likely related to old cerebellar stroke and worsened by current medical condition; consider repeat CT head  - CT head pending  - continue PT/rehab    3. HTN, DM, CVA  - continue current medications  - monitor BP an FS    4. Disposition  - anticipate discharge to SNF once medically cleared

## 2018-03-14 VITALS
SYSTOLIC BLOOD PRESSURE: 149 MMHG | DIASTOLIC BLOOD PRESSURE: 67 MMHG | HEART RATE: 66 BPM | TEMPERATURE: 98 F | RESPIRATION RATE: 17 BRPM

## 2018-03-14 RX ADMIN — SPIRONOLACTONE 25 MILLIGRAM(S): 25 TABLET, FILM COATED ORAL at 06:16

## 2018-03-14 RX ADMIN — Medication 81 MILLIGRAM(S): at 12:10

## 2018-03-14 RX ADMIN — Medication 25 MILLIGRAM(S): at 06:16

## 2018-03-14 RX ADMIN — AMLODIPINE BESYLATE 5 MILLIGRAM(S): 2.5 TABLET ORAL at 06:16

## 2018-03-14 RX ADMIN — GABAPENTIN 300 MILLIGRAM(S): 400 CAPSULE ORAL at 06:16

## 2018-03-14 RX ADMIN — Medication 112 MICROGRAM(S): at 06:17

## 2018-03-14 RX ADMIN — Medication 12.5 MILLIGRAM(S): at 12:10

## 2018-03-14 RX ADMIN — Medication 12 UNIT(S): at 08:02

## 2018-03-14 RX ADMIN — ENOXAPARIN SODIUM 40 MILLIGRAM(S): 100 INJECTION SUBCUTANEOUS at 06:16

## 2018-03-14 RX ADMIN — CLOPIDOGREL BISULFATE 75 MILLIGRAM(S): 75 TABLET, FILM COATED ORAL at 12:10

## 2018-03-15 DIAGNOSIS — E78.5 HYPERLIPIDEMIA, UNSPECIFIED: ICD-10-CM

## 2018-03-15 DIAGNOSIS — R07.9 CHEST PAIN, UNSPECIFIED: ICD-10-CM

## 2018-03-15 DIAGNOSIS — R26.2 DIFFICULTY IN WALKING, NOT ELSEWHERE CLASSIFIED: ICD-10-CM

## 2018-03-15 DIAGNOSIS — R42 DIZZINESS AND GIDDINESS: ICD-10-CM

## 2018-03-15 DIAGNOSIS — E11.9 TYPE 2 DIABETES MELLITUS WITHOUT COMPLICATIONS: ICD-10-CM

## 2018-03-15 DIAGNOSIS — I35.0 NONRHEUMATIC AORTIC (VALVE) STENOSIS: ICD-10-CM

## 2018-03-15 DIAGNOSIS — R35.0 FREQUENCY OF MICTURITION: ICD-10-CM

## 2018-03-15 DIAGNOSIS — F32.9 MAJOR DEPRESSIVE DISORDER, SINGLE EPISODE, UNSPECIFIED: ICD-10-CM

## 2018-03-15 DIAGNOSIS — F41.9 ANXIETY DISORDER, UNSPECIFIED: ICD-10-CM

## 2018-03-15 DIAGNOSIS — E03.9 HYPOTHYROIDISM, UNSPECIFIED: ICD-10-CM

## 2018-03-15 DIAGNOSIS — Z91.14 PATIENT'S OTHER NONCOMPLIANCE WITH MEDICATION REGIMEN: ICD-10-CM

## 2018-03-15 DIAGNOSIS — I10 ESSENTIAL (PRIMARY) HYPERTENSION: ICD-10-CM

## 2018-03-15 DIAGNOSIS — Z86.73 PERSONAL HISTORY OF TRANSIENT ISCHEMIC ATTACK (TIA), AND CEREBRAL INFARCTION WITHOUT RESIDUAL DEFICITS: ICD-10-CM

## 2018-03-15 DIAGNOSIS — F41.0 PANIC DISORDER [EPISODIC PAROXYSMAL ANXIETY]: ICD-10-CM

## 2018-03-15 DIAGNOSIS — I44.7 LEFT BUNDLE-BRANCH BLOCK, UNSPECIFIED: ICD-10-CM

## 2018-03-19 DIAGNOSIS — R00.2 PALPITATIONS: ICD-10-CM

## 2018-03-19 DIAGNOSIS — Z91.81 HISTORY OF FALLING: ICD-10-CM

## 2018-03-29 ENCOUNTER — EMERGENCY (EMERGENCY)
Facility: HOSPITAL | Age: 83
LOS: 0 days | Discharge: HOME | End: 2018-03-29
Attending: EMERGENCY MEDICINE | Admitting: INTERNAL MEDICINE

## 2018-03-29 VITALS
SYSTOLIC BLOOD PRESSURE: 127 MMHG | OXYGEN SATURATION: 96 % | RESPIRATION RATE: 20 BRPM | DIASTOLIC BLOOD PRESSURE: 58 MMHG | HEART RATE: 65 BPM | TEMPERATURE: 96 F

## 2018-03-29 VITALS
RESPIRATION RATE: 18 BRPM | HEART RATE: 66 BPM | DIASTOLIC BLOOD PRESSURE: 60 MMHG | TEMPERATURE: 98 F | OXYGEN SATURATION: 97 % | SYSTOLIC BLOOD PRESSURE: 110 MMHG

## 2018-03-29 DIAGNOSIS — R19.7 DIARRHEA, UNSPECIFIED: ICD-10-CM

## 2018-03-29 DIAGNOSIS — Z88.0 ALLERGY STATUS TO PENICILLIN: ICD-10-CM

## 2018-03-29 DIAGNOSIS — Z79.4 LONG TERM (CURRENT) USE OF INSULIN: ICD-10-CM

## 2018-03-29 DIAGNOSIS — R11.2 NAUSEA WITH VOMITING, UNSPECIFIED: ICD-10-CM

## 2018-03-29 DIAGNOSIS — Z79.82 LONG TERM (CURRENT) USE OF ASPIRIN: ICD-10-CM

## 2018-03-29 DIAGNOSIS — E11.9 TYPE 2 DIABETES MELLITUS WITHOUT COMPLICATIONS: ICD-10-CM

## 2018-03-29 DIAGNOSIS — Z98.49 CATARACT EXTRACTION STATUS, UNSPECIFIED EYE: Chronic | ICD-10-CM

## 2018-03-29 DIAGNOSIS — F32.9 MAJOR DEPRESSIVE DISORDER, SINGLE EPISODE, UNSPECIFIED: ICD-10-CM

## 2018-03-29 DIAGNOSIS — Z91.041 RADIOGRAPHIC DYE ALLERGY STATUS: ICD-10-CM

## 2018-03-29 DIAGNOSIS — Z91.013 ALLERGY TO SEAFOOD: ICD-10-CM

## 2018-03-29 DIAGNOSIS — Z88.5 ALLERGY STATUS TO NARCOTIC AGENT: ICD-10-CM

## 2018-03-29 DIAGNOSIS — Z98.890 OTHER SPECIFIED POSTPROCEDURAL STATES: Chronic | ICD-10-CM

## 2018-03-29 DIAGNOSIS — E03.9 HYPOTHYROIDISM, UNSPECIFIED: ICD-10-CM

## 2018-03-29 DIAGNOSIS — E78.5 HYPERLIPIDEMIA, UNSPECIFIED: ICD-10-CM

## 2018-03-29 PROBLEM — F41.9 ANXIETY DISORDER, UNSPECIFIED: Chronic | Status: ACTIVE | Noted: 2018-03-09

## 2018-03-29 PROBLEM — I10 ESSENTIAL (PRIMARY) HYPERTENSION: Chronic | Status: ACTIVE | Noted: 2018-03-09

## 2018-03-29 PROBLEM — I63.9 CEREBRAL INFARCTION, UNSPECIFIED: Chronic | Status: ACTIVE | Noted: 2018-03-09

## 2018-03-29 PROBLEM — F41.0 PANIC DISORDER [EPISODIC PAROXYSMAL ANXIETY]: Chronic | Status: ACTIVE | Noted: 2018-03-09

## 2018-03-29 LAB
ALBUMIN SERPL ELPH-MCNC: 3.9 G/DL — SIGNIFICANT CHANGE UP (ref 3.5–5.2)
ALP SERPL-CCNC: 90 U/L — SIGNIFICANT CHANGE UP (ref 30–115)
ALT FLD-CCNC: 11 U/L — SIGNIFICANT CHANGE UP (ref 0–41)
ANION GAP SERPL CALC-SCNC: 13 MMOL/L — SIGNIFICANT CHANGE UP (ref 7–14)
APTT BLD: 27.1 SEC — SIGNIFICANT CHANGE UP (ref 27–39.2)
AST SERPL-CCNC: 18 U/L — SIGNIFICANT CHANGE UP (ref 0–41)
BASE EXCESS BLDV CALC-SCNC: 1.1 MMOL/L — SIGNIFICANT CHANGE UP (ref -2–2)
BASOPHILS # BLD AUTO: 0.05 K/UL — SIGNIFICANT CHANGE UP (ref 0–0.2)
BASOPHILS NFR BLD AUTO: 0.6 % — SIGNIFICANT CHANGE UP (ref 0–1)
BILIRUB SERPL-MCNC: 0.2 MG/DL — SIGNIFICANT CHANGE UP (ref 0.2–1.2)
BUN SERPL-MCNC: 9 MG/DL — LOW (ref 10–20)
CA-I SERPL-SCNC: 1.2 MMOL/L — SIGNIFICANT CHANGE UP (ref 1.12–1.3)
CALCIUM SERPL-MCNC: 8.5 MG/DL — SIGNIFICANT CHANGE UP (ref 8.5–10.1)
CHLORIDE SERPL-SCNC: 105 MMOL/L — SIGNIFICANT CHANGE UP (ref 98–110)
CO2 SERPL-SCNC: 26 MMOL/L — SIGNIFICANT CHANGE UP (ref 17–32)
CREAT SERPL-MCNC: 0.6 MG/DL — LOW (ref 0.7–1.5)
EOSINOPHIL # BLD AUTO: 0.3 K/UL — SIGNIFICANT CHANGE UP (ref 0–0.7)
EOSINOPHIL NFR BLD AUTO: 3.8 % — SIGNIFICANT CHANGE UP (ref 0–8)
GAS PNL BLDV: 140 MMOL/L — SIGNIFICANT CHANGE UP (ref 136–145)
GAS PNL BLDV: SIGNIFICANT CHANGE UP
GLUCOSE SERPL-MCNC: 74 MG/DL — SIGNIFICANT CHANGE UP (ref 70–99)
HCO3 BLDV-SCNC: 26 MMOL/L — SIGNIFICANT CHANGE UP (ref 22–29)
HCT VFR BLD CALC: 34.2 % — LOW (ref 37–47)
HCT VFR BLDA CALC: 36 % — SIGNIFICANT CHANGE UP (ref 34–44)
HGB BLD CALC-MCNC: 11.8 G/DL — LOW (ref 14–18)
HGB BLD-MCNC: 10.6 G/DL — LOW (ref 12–16)
IMM GRANULOCYTES NFR BLD AUTO: 0.5 % — HIGH (ref 0.1–0.3)
INR BLD: 1.11 RATIO — SIGNIFICANT CHANGE UP (ref 0.65–1.3)
LACTATE BLDV-MCNC: 1.2 MMOL/L — SIGNIFICANT CHANGE UP (ref 0.5–1.6)
LACTATE SERPL-SCNC: 1.1 MMOL/L — SIGNIFICANT CHANGE UP (ref 0.5–2.2)
LIDOCAIN IGE QN: 32 U/L — SIGNIFICANT CHANGE UP (ref 7–60)
LYMPHOCYTES # BLD AUTO: 1.71 K/UL — SIGNIFICANT CHANGE UP (ref 1.2–3.4)
LYMPHOCYTES # BLD AUTO: 21.7 % — SIGNIFICANT CHANGE UP (ref 20.5–51.1)
MAGNESIUM SERPL-MCNC: 2.1 MG/DL — SIGNIFICANT CHANGE UP (ref 1.8–2.4)
MCHC RBC-ENTMCNC: 23.9 PG — LOW (ref 27–31)
MCHC RBC-ENTMCNC: 31 G/DL — LOW (ref 32–37)
MCV RBC AUTO: 77 FL — LOW (ref 81–99)
MONOCYTES # BLD AUTO: 0.69 K/UL — HIGH (ref 0.1–0.6)
MONOCYTES NFR BLD AUTO: 8.8 % — SIGNIFICANT CHANGE UP (ref 1.7–9.3)
NEUTROPHILS # BLD AUTO: 5.09 K/UL — SIGNIFICANT CHANGE UP (ref 1.4–6.5)
NEUTROPHILS NFR BLD AUTO: 64.6 % — SIGNIFICANT CHANGE UP (ref 42.2–75.2)
NRBC # BLD: 0 /100 WBCS — SIGNIFICANT CHANGE UP (ref 0–0)
PCO2 BLDV: 43 MMHG — SIGNIFICANT CHANGE UP (ref 41–51)
PH BLDV: 7.39 — SIGNIFICANT CHANGE UP (ref 7.26–7.43)
PLATELET # BLD AUTO: 288 K/UL — SIGNIFICANT CHANGE UP (ref 130–400)
PO2 BLDV: 54 MMHG — HIGH (ref 20–40)
POTASSIUM BLDV-SCNC: 4 MMOL/L — SIGNIFICANT CHANGE UP (ref 3.3–5.6)
POTASSIUM SERPL-MCNC: 4.4 MMOL/L — SIGNIFICANT CHANGE UP (ref 3.5–5)
POTASSIUM SERPL-SCNC: 4.4 MMOL/L — SIGNIFICANT CHANGE UP (ref 3.5–5)
PROT SERPL-MCNC: 6.1 G/DL — SIGNIFICANT CHANGE UP (ref 6–8)
PROTHROM AB SERPL-ACNC: 12 SEC — SIGNIFICANT CHANGE UP (ref 9.95–12.87)
RBC # BLD: 4.44 M/UL — SIGNIFICANT CHANGE UP (ref 4.2–5.4)
RBC # FLD: 14.7 % — HIGH (ref 11.5–14.5)
SAO2 % BLDV: 87 % — SIGNIFICANT CHANGE UP
SODIUM SERPL-SCNC: 144 MMOL/L — SIGNIFICANT CHANGE UP (ref 135–146)
WBC # BLD: 7.88 K/UL — SIGNIFICANT CHANGE UP (ref 4.8–10.8)
WBC # FLD AUTO: 7.88 K/UL — SIGNIFICANT CHANGE UP (ref 4.8–10.8)

## 2018-03-29 RX ORDER — SODIUM CHLORIDE 9 MG/ML
1000 INJECTION INTRAMUSCULAR; INTRAVENOUS; SUBCUTANEOUS
Qty: 0 | Refills: 0 | Status: DISCONTINUED | OUTPATIENT
Start: 2018-03-29 | End: 2018-03-29

## 2018-03-29 RX ADMIN — SODIUM CHLORIDE 1000 MILLILITER(S): 9 INJECTION INTRAMUSCULAR; INTRAVENOUS; SUBCUTANEOUS at 16:59

## 2018-03-29 NOTE — ED PROVIDER NOTE - PROGRESS NOTE DETAILS
pt hungry, wants to eat.  will po challenge.  has not required antiemetics in ED. tolerating po, walking in ED

## 2018-03-29 NOTE — ED PROVIDER NOTE - OBJECTIVE STATEMENT
83 yo f with pmh of htn, hld, dm, cva, presents with c/o n/v/d x 3 days.  no abd pain.  no urinary sx.  pt says soft stool, not watery, nonbloody.  no chest pain, no back pain.  mild global weakness.

## 2018-03-29 NOTE — ED PROVIDER NOTE - NS ED ROS FT
Review of Systems:  	•	CONSTITUTIONAL - no fever, no diaphoresis, no weight change  	•	SKIN - no rash  	•	HEMATOLOGIC - no bleeding, no bruising  	•	EYES - no eye pain, no blurred vision  	•	ENT - no change in hearing, no pain  	•	RESPIRATORY - no shortness of breath, no cough  	•	CARDIAC - no chest pain, no palpitations  	•	GI - no abd pain,+n/v/d, no constipation, no bleeding  	•	ENDO - no polydypsia, no polyurea, no heat/no cold intolerance  	•	MUSCULOSKELETAL - no joint paint, no swelling, no redness  	•	NEUROLOGIC - no weakness, no headache, no anesthesia, no paresthesias

## 2018-06-23 ENCOUNTER — INPATIENT (INPATIENT)
Facility: HOSPITAL | Age: 83
LOS: 1 days | Discharge: HOME | End: 2018-06-25
Attending: INTERNAL MEDICINE | Admitting: INTERNAL MEDICINE

## 2018-06-23 VITALS
HEART RATE: 94 BPM | DIASTOLIC BLOOD PRESSURE: 104 MMHG | SYSTOLIC BLOOD PRESSURE: 203 MMHG | RESPIRATION RATE: 20 BRPM | OXYGEN SATURATION: 97 % | TEMPERATURE: 97 F

## 2018-06-23 DIAGNOSIS — Z98.49 CATARACT EXTRACTION STATUS, UNSPECIFIED EYE: Chronic | ICD-10-CM

## 2018-06-23 DIAGNOSIS — Z98.890 OTHER SPECIFIED POSTPROCEDURAL STATES: Chronic | ICD-10-CM

## 2018-06-23 LAB
ALBUMIN SERPL ELPH-MCNC: 4.1 G/DL — SIGNIFICANT CHANGE UP (ref 3.5–5.2)
ALP SERPL-CCNC: 115 U/L — SIGNIFICANT CHANGE UP (ref 30–115)
ALT FLD-CCNC: 7 U/L — SIGNIFICANT CHANGE UP (ref 0–41)
ANION GAP SERPL CALC-SCNC: 15 MMOL/L — HIGH (ref 7–14)
APTT BLD: 35.2 SEC — SIGNIFICANT CHANGE UP (ref 27–39.2)
AST SERPL-CCNC: 15 U/L — SIGNIFICANT CHANGE UP (ref 0–41)
BASE EXCESS BLDV CALC-SCNC: 1.7 MMOL/L — SIGNIFICANT CHANGE UP (ref -2–2)
BASOPHILS # BLD AUTO: 0.06 K/UL — SIGNIFICANT CHANGE UP (ref 0–0.2)
BASOPHILS NFR BLD AUTO: 0.8 % — SIGNIFICANT CHANGE UP (ref 0–1)
BILIRUB SERPL-MCNC: 0.4 MG/DL — SIGNIFICANT CHANGE UP (ref 0.2–1.2)
BUN SERPL-MCNC: 9 MG/DL — LOW (ref 10–20)
CA-I SERPL-SCNC: 1.19 MMOL/L — SIGNIFICANT CHANGE UP (ref 1.12–1.3)
CALCIUM SERPL-MCNC: 9.2 MG/DL — SIGNIFICANT CHANGE UP (ref 8.5–10.1)
CHLORIDE SERPL-SCNC: 98 MMOL/L — SIGNIFICANT CHANGE UP (ref 98–110)
CK MB CFR SERPL CALC: 1.7 NG/ML — SIGNIFICANT CHANGE UP (ref 0.6–6.3)
CK SERPL-CCNC: 56 U/L — SIGNIFICANT CHANGE UP (ref 0–225)
CO2 SERPL-SCNC: 24 MMOL/L — SIGNIFICANT CHANGE UP (ref 17–32)
CREAT SERPL-MCNC: 0.7 MG/DL — SIGNIFICANT CHANGE UP (ref 0.7–1.5)
EOSINOPHIL # BLD AUTO: 0.35 K/UL — SIGNIFICANT CHANGE UP (ref 0–0.7)
EOSINOPHIL NFR BLD AUTO: 4.5 % — SIGNIFICANT CHANGE UP (ref 0–8)
GAS PNL BLDV: 139 MMOL/L — SIGNIFICANT CHANGE UP (ref 136–145)
GAS PNL BLDV: SIGNIFICANT CHANGE UP
GLUCOSE SERPL-MCNC: 208 MG/DL — HIGH (ref 70–99)
HCO3 BLDV-SCNC: 27 MMOL/L — SIGNIFICANT CHANGE UP (ref 22–29)
HCT VFR BLD CALC: 37.8 % — SIGNIFICANT CHANGE UP (ref 37–47)
HCT VFR BLDA CALC: 38.6 % — SIGNIFICANT CHANGE UP (ref 34–44)
HGB BLD CALC-MCNC: 12.6 G/DL — LOW (ref 14–18)
HGB BLD-MCNC: 11.8 G/DL — LOW (ref 12–16)
IMM GRANULOCYTES NFR BLD AUTO: 0.4 % — HIGH (ref 0.1–0.3)
INR BLD: 1.08 RATIO — SIGNIFICANT CHANGE UP (ref 0.65–1.3)
LACTATE BLDV-MCNC: 1.6 MMOL/L — SIGNIFICANT CHANGE UP (ref 0.5–1.6)
LIDOCAIN IGE QN: 25 U/L — SIGNIFICANT CHANGE UP (ref 7–60)
LYMPHOCYTES # BLD AUTO: 1.48 K/UL — SIGNIFICANT CHANGE UP (ref 1.2–3.4)
LYMPHOCYTES # BLD AUTO: 19 % — LOW (ref 20.5–51.1)
MAGNESIUM SERPL-MCNC: 1.9 MG/DL — SIGNIFICANT CHANGE UP (ref 1.8–2.4)
MCHC RBC-ENTMCNC: 23.4 PG — LOW (ref 27–31)
MCHC RBC-ENTMCNC: 31.2 G/DL — LOW (ref 32–37)
MCV RBC AUTO: 74.9 FL — LOW (ref 81–99)
MONOCYTES # BLD AUTO: 0.55 K/UL — SIGNIFICANT CHANGE UP (ref 0.1–0.6)
MONOCYTES NFR BLD AUTO: 7 % — SIGNIFICANT CHANGE UP (ref 1.7–9.3)
NEUTROPHILS # BLD AUTO: 5.34 K/UL — SIGNIFICANT CHANGE UP (ref 1.4–6.5)
NEUTROPHILS NFR BLD AUTO: 68.3 % — SIGNIFICANT CHANGE UP (ref 42.2–75.2)
NT-PROBNP SERPL-SCNC: 230 PG/ML — SIGNIFICANT CHANGE UP (ref 0–300)
PCO2 BLDV: 44 MMHG — SIGNIFICANT CHANGE UP (ref 41–51)
PH BLDV: 7.4 — SIGNIFICANT CHANGE UP (ref 7.26–7.43)
PLATELET # BLD AUTO: 273 K/UL — SIGNIFICANT CHANGE UP (ref 130–400)
PO2 BLDV: 34 MMHG — SIGNIFICANT CHANGE UP (ref 20–40)
POTASSIUM BLDV-SCNC: 4 MMOL/L — SIGNIFICANT CHANGE UP (ref 3.3–5.6)
POTASSIUM SERPL-MCNC: 4.3 MMOL/L — SIGNIFICANT CHANGE UP (ref 3.5–5)
POTASSIUM SERPL-SCNC: 4.3 MMOL/L — SIGNIFICANT CHANGE UP (ref 3.5–5)
PROT SERPL-MCNC: 7 G/DL — SIGNIFICANT CHANGE UP (ref 6–8)
PROTHROM AB SERPL-ACNC: 11.7 SEC — SIGNIFICANT CHANGE UP (ref 9.95–12.87)
RBC # BLD: 5.05 M/UL — SIGNIFICANT CHANGE UP (ref 4.2–5.4)
RBC # FLD: 16 % — HIGH (ref 11.5–14.5)
SAO2 % BLDV: 62 % — SIGNIFICANT CHANGE UP
SODIUM SERPL-SCNC: 137 MMOL/L — SIGNIFICANT CHANGE UP (ref 135–146)
TROPONIN T SERPL-MCNC: <0.01 NG/ML — SIGNIFICANT CHANGE UP
WBC # BLD: 7.81 K/UL — SIGNIFICANT CHANGE UP (ref 4.8–10.8)
WBC # FLD AUTO: 7.81 K/UL — SIGNIFICANT CHANGE UP (ref 4.8–10.8)

## 2018-06-23 RX ORDER — CLOPIDOGREL BISULFATE 75 MG/1
75 TABLET, FILM COATED ORAL DAILY
Qty: 0 | Refills: 0 | Status: DISCONTINUED | OUTPATIENT
Start: 2018-06-23 | End: 2018-06-25

## 2018-06-23 RX ORDER — INSULIN LISPRO 100/ML
12 VIAL (ML) SUBCUTANEOUS
Qty: 0 | Refills: 0 | Status: DISCONTINUED | OUTPATIENT
Start: 2018-06-23 | End: 2018-06-25

## 2018-06-23 RX ORDER — DEXTROSE 50 % IN WATER 50 %
25 SYRINGE (ML) INTRAVENOUS ONCE
Qty: 0 | Refills: 0 | Status: DISCONTINUED | OUTPATIENT
Start: 2018-06-23 | End: 2018-06-25

## 2018-06-23 RX ORDER — SIMVASTATIN 20 MG/1
40 TABLET, FILM COATED ORAL AT BEDTIME
Qty: 0 | Refills: 0 | Status: DISCONTINUED | OUTPATIENT
Start: 2018-06-23 | End: 2018-06-25

## 2018-06-23 RX ORDER — SODIUM CHLORIDE 9 MG/ML
1000 INJECTION, SOLUTION INTRAVENOUS ONCE
Qty: 0 | Refills: 0 | Status: COMPLETED | OUTPATIENT
Start: 2018-06-23 | End: 2018-06-23

## 2018-06-23 RX ORDER — GLUCAGON INJECTION, SOLUTION 0.5 MG/.1ML
1 INJECTION, SOLUTION SUBCUTANEOUS ONCE
Qty: 0 | Refills: 0 | Status: DISCONTINUED | OUTPATIENT
Start: 2018-06-23 | End: 2018-06-25

## 2018-06-23 RX ORDER — METOPROLOL TARTRATE 50 MG
25 TABLET ORAL
Qty: 0 | Refills: 0 | Status: DISCONTINUED | OUTPATIENT
Start: 2018-06-23 | End: 2018-06-25

## 2018-06-23 RX ORDER — INSULIN LISPRO 100/ML
12 VIAL (ML) SUBCUTANEOUS
Qty: 0 | Refills: 0 | Status: DISCONTINUED | OUTPATIENT
Start: 2018-06-23 | End: 2018-06-23

## 2018-06-23 RX ORDER — MECLIZINE HCL 12.5 MG
12.5 TABLET ORAL DAILY
Qty: 0 | Refills: 0 | Status: DISCONTINUED | OUTPATIENT
Start: 2018-06-23 | End: 2018-06-25

## 2018-06-23 RX ORDER — INSULIN GLARGINE 100 [IU]/ML
34 INJECTION, SOLUTION SUBCUTANEOUS AT BEDTIME
Qty: 0 | Refills: 0 | Status: DISCONTINUED | OUTPATIENT
Start: 2018-06-23 | End: 2018-06-25

## 2018-06-23 RX ORDER — SPIRONOLACTONE 25 MG/1
25 TABLET, FILM COATED ORAL DAILY
Qty: 0 | Refills: 0 | Status: DISCONTINUED | OUTPATIENT
Start: 2018-06-23 | End: 2018-06-25

## 2018-06-23 RX ORDER — SODIUM CHLORIDE 9 MG/ML
1000 INJECTION, SOLUTION INTRAVENOUS ONCE
Qty: 0 | Refills: 0 | Status: DISCONTINUED | OUTPATIENT
Start: 2018-06-23 | End: 2018-06-25

## 2018-06-23 RX ORDER — DEXTROSE 50 % IN WATER 50 %
15 SYRINGE (ML) INTRAVENOUS ONCE
Qty: 0 | Refills: 0 | Status: DISCONTINUED | OUTPATIENT
Start: 2018-06-23 | End: 2018-06-25

## 2018-06-23 RX ORDER — GABAPENTIN 400 MG/1
300 CAPSULE ORAL THREE TIMES A DAY
Qty: 0 | Refills: 0 | Status: DISCONTINUED | OUTPATIENT
Start: 2018-06-23 | End: 2018-06-25

## 2018-06-23 RX ORDER — SODIUM CHLORIDE 9 MG/ML
1000 INJECTION, SOLUTION INTRAVENOUS
Qty: 0 | Refills: 0 | Status: DISCONTINUED | OUTPATIENT
Start: 2018-06-23 | End: 2018-06-25

## 2018-06-23 RX ORDER — ONDANSETRON 8 MG/1
4 TABLET, FILM COATED ORAL ONCE
Qty: 0 | Refills: 0 | Status: COMPLETED | OUTPATIENT
Start: 2018-06-23 | End: 2018-06-23

## 2018-06-23 RX ORDER — ENOXAPARIN SODIUM 100 MG/ML
40 INJECTION SUBCUTANEOUS EVERY 24 HOURS
Qty: 0 | Refills: 0 | Status: DISCONTINUED | OUTPATIENT
Start: 2018-06-23 | End: 2018-06-25

## 2018-06-23 RX ORDER — LEVOTHYROXINE SODIUM 125 MCG
112 TABLET ORAL DAILY
Qty: 0 | Refills: 0 | Status: DISCONTINUED | OUTPATIENT
Start: 2018-06-23 | End: 2018-06-25

## 2018-06-23 RX ORDER — ASPIRIN/CALCIUM CARB/MAGNESIUM 324 MG
81 TABLET ORAL DAILY
Qty: 0 | Refills: 0 | Status: DISCONTINUED | OUTPATIENT
Start: 2018-06-23 | End: 2018-06-25

## 2018-06-23 RX ORDER — INSULIN DETEMIR 100/ML (3)
34 INSULIN PEN (ML) SUBCUTANEOUS AT BEDTIME
Qty: 0 | Refills: 0 | Status: DISCONTINUED | OUTPATIENT
Start: 2018-06-23 | End: 2018-06-23

## 2018-06-23 RX ORDER — INSULIN LISPRO 100/ML
VIAL (ML) SUBCUTANEOUS
Qty: 0 | Refills: 0 | Status: DISCONTINUED | OUTPATIENT
Start: 2018-06-23 | End: 2018-06-25

## 2018-06-23 RX ORDER — DEXTROSE 50 % IN WATER 50 %
12.5 SYRINGE (ML) INTRAVENOUS ONCE
Qty: 0 | Refills: 0 | Status: DISCONTINUED | OUTPATIENT
Start: 2018-06-23 | End: 2018-06-25

## 2018-06-23 RX ADMIN — SODIUM CHLORIDE 2000 MILLILITER(S): 9 INJECTION, SOLUTION INTRAVENOUS at 15:20

## 2018-06-23 RX ADMIN — ONDANSETRON 4 MILLIGRAM(S): 8 TABLET, FILM COATED ORAL at 15:19

## 2018-06-23 RX ADMIN — SIMVASTATIN 40 MILLIGRAM(S): 20 TABLET, FILM COATED ORAL at 22:17

## 2018-06-23 RX ADMIN — ENOXAPARIN SODIUM 40 MILLIGRAM(S): 100 INJECTION SUBCUTANEOUS at 22:17

## 2018-06-23 RX ADMIN — GABAPENTIN 300 MILLIGRAM(S): 400 CAPSULE ORAL at 22:17

## 2018-06-23 NOTE — ED PROVIDER NOTE - ATTENDING CONTRIBUTION TO CARE
82F PMH htn, hl, dm, cva, aaa, hypothyroid, anxiety, p/w 4 days n/v/d. no abd pain. nbnd. no dysuria, freq, hematuria. no fever chills. no recent travel, sick contacts. no cp. +sob and lightheaded assoc. seen at [facility] yesterday, had normal CTH and dc home. no ha, numbness, weakness, tingling, AMS. no trauma. no visual/gait/speech changes. feels thirsty and malaise. has had this n/v/d intermittent x months and flares up from time to time. no cough, URI sx. no le edema. no immobilization, calf pain, hormones, hemoptysis. on exam, AFVSS, well wally nad, ncat, eomi, perrla, DRY mm, lctab, rrr nl s1s2 no mrg, abd soft ntnd, aaox3, no focal deficits, no le edema or calf ttp, a/p; n/v/d, SOB- not tolerating po, appears dehydrated, will do labs, CXR, ekg, ua, ivf, antiemetics, re-eval.

## 2018-06-23 NOTE — H&P ADULT - ASSESSMENT
83 yo F w/ significant PMH of DM, HTN, hypothyroidism, CVA, depression, anxiety presented with several day history of weakness + vomiting + diarrhea.    #) Chronic diarrhea + vomiting  - given chronic nature less likely to be infectious. Possibly IBS vs IBD vs malabsorption vs ???  - will send stool studies  - consider GI consult    #) EKG changes   - CE -ve x1  - PMD Dr Du requesting Cardio consult Dr Ruiz    #) HTN - c/w Metoprolol + Spironolactone. IF BP still high can add Norvasc 5.    #) DM - c/w insulin basal + bolus @ home dose for now, adjust PRN.    #) CVA - c/w DAPT    #) Hypothyroid - c/w Synthroid    #) Depression - c/w Paroxetine    #) Anxiety - c/w Ativan PRN    Activity: ambulate with assistance  Diet: DASH  DVT ppx: Lovenox subQ  Code status: FULL  Dispo: from home 83 yo F w/ significant PMH of DM, HTN, hypothyroidism, CVA, depression, anxiety presented with several day history of weakness + vomiting + diarrhea.    #) Chronic diarrhea + vomiting  - given chronic nature less likely to be infectious. Possibly IBS vs IBD vs malabsorption vs ???  - will send stool studies  - consider GI consult    #) EKG changes   - CE -ve x1  - recent nuclear stress test from March 2018 was unremarkable, EF=53%  - PMD Dr Du requesting Cardio consult Dr Ruiz    #) HTN - c/w Metoprolol + Spironolactone. IF BP still high can add Norvasc 5.    #) DM - c/w insulin basal + bolus @ home dose for now, adjust PRN.    #) CVA - c/w DAPT    #) Hypothyroid - c/w Synthroid    #) Depression - c/w Paroxetine    #) Anxiety - c/w Ativan PRN    Activity: ambulate with assistance  Diet: DASH  DVT ppx: Lovenox subQ  Code status: FULL  Dispo: from home

## 2018-06-23 NOTE — PROGRESS NOTE ADULT - ASSESSMENT
One of multiple admissions for this 82 yr old f brought in for extreme weakness, episodes of diarrhoea and vomiting worse in the last 4 to 5 days and tendency to fall . Also has SOB on exertion with EKG changes of poor R wave progression V1 to V3 and T down lateral leads and to r/o ischemic heart disease. Has diabetes not well controlled due to lack of activity and poor meal planning, Hyothyroidism and h/o Cerebellar infarct causing [place] of balance.  Alert and well oriented. Euthyroid , heart regular chest clear, .Limited  CNS evaluation was normal.  PLAN.  HBAIC, lipids , carotid doppler, Cardiac enzymes, echocardiogram cardiology consult with Dr CASTRO

## 2018-06-23 NOTE — ED PROVIDER NOTE - PHYSICAL EXAMINATION
CONSTITUTIONAL: Well-developed; well-nourished; in no acute distress.   SKIN: warm, dry  HEAD: Normocephalic; atraumatic.  EYES: PERRL, EOMI, no conjunctival erythema  ENT: No nasal discharge; airway clear.  NECK: Supple; non tender.  CARD: S1, S2 normal; no murmurs, gallops, or rubs. Regular rate and rhythm.   RESP: No wheezes, rales or rhonchi.  ABD: soft ntnd  EXT: Normal ROM.  No clubbing, cyanosis or edema.   NEURO: Alert, oriented, grossly unremarkable

## 2018-06-23 NOTE — ED PROVIDER NOTE - NS ED ROS FT
Eyes:  No visual changes, eye pain or discharge.  ENMT:  No hearing changes, pain, no sore throat or runny nose, no difficulty swallowing  Cardiac:  No chest pain, SOB or edema. No chest pain with exertion.  Respiratory:  No cough or respiratory distress. No hemoptysis. No history of asthma or RAD.  GI:  vomiting, diarrhea   :  No dysuria, frequency or burning.  MS:  leg burning   Neuro:  dizziness   Skin:  No skin rash.   Endocrine: + DM.

## 2018-06-23 NOTE — H&P ADULT - HISTORY OF PRESENT ILLNESS
81 yo F w/ significant PMH of DM, HTN, hypothyroidism, CVA, depression, anxiety presented with several day history of weakness + vomiting + diarrhea. History obtained mostly from daughter at bedside. History of diarrhea goes back several years. Has been on/off for long time, previously went to GI doctor years ago though were unhappy with care. Report having an EGD as well as colonoscopy years ago, though never followed up on results. Report having associated N+V with the diarrhea occasionally. Not related to any food intake, never any blood in emesis or stool, consistency varies. Denies any recent Abx use, sick contacts, travel, fever, weight changes, or abdominal pain. Does endorse anxiety - sees Psychiatrist @ [facility] regularly (Dr Mclaughlin?).     In ED patient also noted to have new T-wave inversions, no chest pain.

## 2018-06-23 NOTE — H&P ADULT - NSHPPHYSICALEXAM_GEN_ALL_CORE
Gen: NAD, comfortable, elderly  Cardio: RRR, no m/r/g  Resp: CTAB, no w/r/r  Abd: soft, NT/ND, +BS  Ext: no edema, pp b/l  Neuro: AAOx3, forgetful at times

## 2018-06-23 NOTE — ED PROVIDER NOTE - MEDICAL DECISION MAKING DETAILS
pt found to have new TWI II, v5-6, no cp but +sob and very dehydrated, troponin neg, ?demand ischemic changes, will admit for further work up

## 2018-06-23 NOTE — ED PROVIDER NOTE - OBJECTIVE STATEMENT
81 yo F w hxo f CAD, DM, HTN,  c/o 4  days of n/v/d and dizziness.  pt seen at [facility] 1 day ago and had normal CT head and discharged home.  Pt states she is still vomiting and having diarrhea.  Last vomit was 1 day ago.  no fever.  no abdominal pain.  no chest pain or sob

## 2018-06-24 LAB
ALBUMIN SERPL ELPH-MCNC: 3.8 G/DL — SIGNIFICANT CHANGE UP (ref 3.5–5.2)
ALP SERPL-CCNC: 105 U/L — SIGNIFICANT CHANGE UP (ref 30–115)
ALT FLD-CCNC: 9 U/L — SIGNIFICANT CHANGE UP (ref 0–41)
ANION GAP SERPL CALC-SCNC: 14 MMOL/L — SIGNIFICANT CHANGE UP (ref 7–14)
AST SERPL-CCNC: 17 U/L — SIGNIFICANT CHANGE UP (ref 0–41)
BASOPHILS # BLD AUTO: 0.1 K/UL — SIGNIFICANT CHANGE UP (ref 0–0.2)
BASOPHILS NFR BLD AUTO: 1.2 % — HIGH (ref 0–1)
BILIRUB SERPL-MCNC: 0.4 MG/DL — SIGNIFICANT CHANGE UP (ref 0.2–1.2)
BUN SERPL-MCNC: 8 MG/DL — LOW (ref 10–20)
CALCIUM SERPL-MCNC: 8.9 MG/DL — SIGNIFICANT CHANGE UP (ref 8.5–10.1)
CHLORIDE SERPL-SCNC: 102 MMOL/L — SIGNIFICANT CHANGE UP (ref 98–110)
CHOLEST SERPL-MCNC: 166 MG/DL — SIGNIFICANT CHANGE UP (ref 100–200)
CK SERPL-CCNC: 49 U/L — SIGNIFICANT CHANGE UP (ref 0–225)
CK SERPL-CCNC: 55 U/L — SIGNIFICANT CHANGE UP (ref 0–225)
CO2 SERPL-SCNC: 25 MMOL/L — SIGNIFICANT CHANGE UP (ref 17–32)
CREAT SERPL-MCNC: 0.7 MG/DL — SIGNIFICANT CHANGE UP (ref 0.7–1.5)
EOSINOPHIL # BLD AUTO: 0.5 K/UL — SIGNIFICANT CHANGE UP (ref 0–0.7)
EOSINOPHIL NFR BLD AUTO: 6.1 % — SIGNIFICANT CHANGE UP (ref 0–8)
ESTIMATED AVERAGE GLUCOSE: 229 MG/DL — HIGH (ref 68–114)
GLUCOSE SERPL-MCNC: 209 MG/DL — HIGH (ref 70–99)
HBA1C BLD-MCNC: 9.6 % — HIGH (ref 4–5.6)
HCT VFR BLD CALC: 36 % — LOW (ref 37–47)
HDLC SERPL-MCNC: 26 MG/DL — LOW (ref 40–125)
HGB BLD-MCNC: 11.2 G/DL — LOW (ref 12–16)
IMM GRANULOCYTES NFR BLD AUTO: 0.4 % — HIGH (ref 0.1–0.3)
LIPID PNL WITH DIRECT LDL SERPL: 99 MG/DL — SIGNIFICANT CHANGE UP (ref 4–129)
LYMPHOCYTES # BLD AUTO: 1.64 K/UL — SIGNIFICANT CHANGE UP (ref 1.2–3.4)
LYMPHOCYTES # BLD AUTO: 20 % — LOW (ref 20.5–51.1)
MAGNESIUM SERPL-MCNC: 2 MG/DL — SIGNIFICANT CHANGE UP (ref 1.8–2.4)
MCHC RBC-ENTMCNC: 23.5 PG — LOW (ref 27–31)
MCHC RBC-ENTMCNC: 31.1 G/DL — LOW (ref 32–37)
MCV RBC AUTO: 75.6 FL — LOW (ref 81–99)
MONOCYTES # BLD AUTO: 0.64 K/UL — HIGH (ref 0.1–0.6)
MONOCYTES NFR BLD AUTO: 7.8 % — SIGNIFICANT CHANGE UP (ref 1.7–9.3)
NEUTROPHILS # BLD AUTO: 5.3 K/UL — SIGNIFICANT CHANGE UP (ref 1.4–6.5)
NEUTROPHILS NFR BLD AUTO: 64.5 % — SIGNIFICANT CHANGE UP (ref 42.2–75.2)
NRBC # BLD: 0 /100 WBCS — SIGNIFICANT CHANGE UP (ref 0–0)
PHOSPHATE SERPL-MCNC: 3.8 MG/DL — SIGNIFICANT CHANGE UP (ref 2.1–4.9)
PLATELET # BLD AUTO: 281 K/UL — SIGNIFICANT CHANGE UP (ref 130–400)
POTASSIUM SERPL-MCNC: 4.3 MMOL/L — SIGNIFICANT CHANGE UP (ref 3.5–5)
POTASSIUM SERPL-SCNC: 4.3 MMOL/L — SIGNIFICANT CHANGE UP (ref 3.5–5)
PROT SERPL-MCNC: 6.3 G/DL — SIGNIFICANT CHANGE UP (ref 6–8)
RBC # BLD: 4.76 M/UL — SIGNIFICANT CHANGE UP (ref 4.2–5.4)
RBC # FLD: 15.9 % — HIGH (ref 11.5–14.5)
SODIUM SERPL-SCNC: 141 MMOL/L — SIGNIFICANT CHANGE UP (ref 135–146)
TOTAL CHOLESTEROL/HDL RATIO MEASUREMENT: 6.4 RATIO — HIGH (ref 4–5.5)
TRIGL SERPL-MCNC: 392 MG/DL — HIGH (ref 10–149)
TROPONIN T SERPL-MCNC: <0.01 NG/ML — SIGNIFICANT CHANGE UP
TROPONIN T SERPL-MCNC: <0.01 NG/ML — SIGNIFICANT CHANGE UP
WBC # BLD: 8.21 K/UL — SIGNIFICANT CHANGE UP (ref 4.8–10.8)
WBC # FLD AUTO: 8.21 K/UL — SIGNIFICANT CHANGE UP (ref 4.8–10.8)

## 2018-06-24 RX ADMIN — Medication 81 MILLIGRAM(S): at 12:10

## 2018-06-24 RX ADMIN — Medication 1: at 08:10

## 2018-06-24 RX ADMIN — GABAPENTIN 300 MILLIGRAM(S): 400 CAPSULE ORAL at 21:04

## 2018-06-24 RX ADMIN — GABAPENTIN 300 MILLIGRAM(S): 400 CAPSULE ORAL at 05:43

## 2018-06-24 RX ADMIN — GABAPENTIN 300 MILLIGRAM(S): 400 CAPSULE ORAL at 14:32

## 2018-06-24 RX ADMIN — Medication 40 MILLIGRAM(S): at 12:10

## 2018-06-24 RX ADMIN — CLOPIDOGREL BISULFATE 75 MILLIGRAM(S): 75 TABLET, FILM COATED ORAL at 12:10

## 2018-06-24 RX ADMIN — Medication 1: at 17:05

## 2018-06-24 RX ADMIN — Medication 112 MICROGRAM(S): at 05:43

## 2018-06-24 RX ADMIN — Medication 12 UNIT(S): at 08:11

## 2018-06-24 RX ADMIN — Medication 25 MILLIGRAM(S): at 17:06

## 2018-06-24 RX ADMIN — Medication 12 UNIT(S): at 17:06

## 2018-06-24 RX ADMIN — Medication 12 UNIT(S): at 12:11

## 2018-06-24 RX ADMIN — SPIRONOLACTONE 25 MILLIGRAM(S): 25 TABLET, FILM COATED ORAL at 05:43

## 2018-06-24 RX ADMIN — Medication 25 MILLIGRAM(S): at 05:42

## 2018-06-24 RX ADMIN — ENOXAPARIN SODIUM 40 MILLIGRAM(S): 100 INJECTION SUBCUTANEOUS at 21:29

## 2018-06-24 RX ADMIN — SIMVASTATIN 40 MILLIGRAM(S): 20 TABLET, FILM COATED ORAL at 21:04

## 2018-06-24 RX ADMIN — Medication 1: at 12:11

## 2018-06-24 RX ADMIN — INSULIN GLARGINE 34 UNIT(S): 100 INJECTION, SOLUTION SUBCUTANEOUS at 21:05

## 2018-06-24 NOTE — CONSULT NOTE ADULT - SUBJECTIVE AND OBJECTIVE BOX
Patient is a 82y old  Female who presents with a chief complaint of weakness + vomiting + diarrhea (23 Jun 2018 19:48)      REASON FOR CONSULT     HPI:  83 yo F w/ significant PMH of DM, HTN, hypothyroidism, CVA, depression, anxiety presented with several day history of weakness + vomiting + diarrhea. History obtained mostly from daughter at bedside. History of diarrhea goes back several years. Has been on/off for long time, previously went to GI doctor years ago though were unhappy with care. Report having an EGD as well as colonoscopy years ago, though never followed up on results. Report having associated N+V with the diarrhea occasionally. Not related to any food intake, never any blood in emesis or stool, consistency varies. Denies any recent Abx use, sick contacts, travel, fever, weight changes, or abdominal pain. Does endorse anxiety - sees Psychiatrist @ [facility] regularly (Dr Mclaughlin?).     In ED patient also noted to have new T-wave inversions, no chest pain. (23 Jun 2018 19:48)      PAST MEDICAL & SURGICAL HISTORY:  Vertigo  Hypothyroid  Brain aneurysm  AAA (abdominal aortic aneurysm)  HLD (hyperlipidemia)  HTN (hypertension)  Depression  Anxiety  Panic disorder  DM (diabetes mellitus)  CVA (cerebral vascular accident)  H/O: hysterectomy  Status post cataract surgery          SOCIAL HISTORY:     FAMILY HISTORY:  Family history of breast cancer (Sibling)  Family history of prostate cancer in father (Father)    codeine (Nausea)  IV Contrast (Anaphylaxis)  penicillin (Hives)  shellfish (Unknown)      MEDICATIONS  (STANDING):  aspirin  chewable 81 milliGRAM(s) Oral daily  clopidogrel Tablet 75 milliGRAM(s) Oral daily  dextrose 5%. 1000 milliLiter(s) (50 mL/Hr) IV Continuous <Continuous>  dextrose 50% Injectable 12.5 Gram(s) IV Push once  dextrose 50% Injectable 25 Gram(s) IV Push once  dextrose 50% Injectable 25 Gram(s) IV Push once  enoxaparin Injectable 40 milliGRAM(s) SubCutaneous every 24 hours  gabapentin 300 milliGRAM(s) Oral three times a day  insulin glargine Injectable (LANTUS) 34 Unit(s) SubCutaneous at bedtime  insulin lispro (HumaLOG) corrective regimen sliding scale   SubCutaneous three times a day before meals  insulin lispro Injectable (HumaLOG) 12 Unit(s) SubCutaneous before breakfast  insulin lispro Injectable (HumaLOG) 12 Unit(s) SubCutaneous before lunch  insulin lispro Injectable (HumaLOG) 12 Unit(s) SubCutaneous before dinner  lactated ringers Bolus 1000 milliLiter(s) IV Bolus once  levothyroxine 112 MICROGram(s) Oral daily  metoprolol tartrate 25 milliGRAM(s) Oral two times a day  PARoxetine 40 milliGRAM(s) Oral daily  simvastatin 40 milliGRAM(s) Oral at bedtime  spironolactone 25 milliGRAM(s) Oral daily    MEDICATIONS  (PRN):  dextrose 40% Gel 15 Gram(s) Oral once PRN Blood Glucose LESS THAN 70 milliGRAM(s)/deciliter  glucagon  Injectable 1 milliGRAM(s) IntraMuscular once PRN Glucose LESS THAN 70 milligrams/deciliter  LORazepam     Tablet 0.5 milliGRAM(s) Oral at bedtime PRN Anxiety/Agitation/Sleep  meclizine 12.5 milliGRAM(s) Oral daily PRN Dizziness      Vital Signs Last 24 Hrs  T(C): 36.3 (24 Jun 2018 13:35), Max: 36.3 (24 Jun 2018 13:35)  T(F): 97.3 (24 Jun 2018 13:35), Max: 97.3 (24 Jun 2018 13:35)  HR: 72 (24 Jun 2018 13:35) (72 - 91)  BP: 129/72 (24 Jun 2018 13:35) (129/60 - 173/77)  BP(mean): --  RR: 17 (23 Jun 2018 21:56) (17 - 20)  SpO2: 97% (23 Jun 2018 21:56) (96% - 97%) I&O's Detail    PHYSICAL EXAM:      Constitutional: appears stated age, well developed/nourished, no acute distress    Eyes: EOM's intact.  PERRLA    ENMT: Normocepahic, atraumatic.  Clear oropharynx.  No ear discharge.    Neck: Jugular veins non-distended; no carotid bruits bilaterally.    Breasts: No gross abnormalities noted.    Respiratory: respiratory pattern unlabored; no dullness to percussion; lungs clear to asuculatation bilaterally.    Cardiovascular: Regular rhythm.  S1 and S2 normal.  No murmur nor rub appreciated.    Abdomen: Soft, non-tender.  Normal bowel sounds.    Extremities: extremities warm; no cyanosis, clubbing or edema.    Pulses: Intact bilaterally    Skin: No gross abnormalities noted.    Musculoskeletal: No gross deformities    Neurological: Alert, oriented x 3.  No focal neurologic deficits noted.  REVIEW OF SYSTEMS      CONSTITUTIONAL:  No night sweats.  No fatigue, malaise, lethargy.  No fever or chills.    HEENT:  Eyes:  No visual changes.  No eye pain.  No eye discharge.  ENT:  No runny nose.  No epistaxis.  No sinus pain.  No sore throat.  No odynophagia.  No ear pain.  No congestion.    BREASTS:  No breast pain, soreness, lumps, or discharge.    RESPIRATORY:  No cough.  No wheeze.  No hemoptysis.  No shortness of breath.    CARDIOVASCULAR:  No chest pains.  No palpitations.     GASTROINTESTINAL:  No abdominal pain.  No nausea or vomiting.  No diarrhea or constipation.  No hematemesis.  No hematochezia.  No melena.    GENITOURINARY:  No urgency.  No frequency.  No dysuria.  No hematuria.  No obstructive symptoms.  No discharge.  No pain.  No significant abnormal bleeding.    MUSCULOSKELETAL:  No musculoskeletal pain.  No joint swelling.  No arthritis.    NEUROLOGICAL:    No headache or neck pain.  No syncope or seizure. no weakness . No Vertigo.    SKIN:  No rashes.  No lesions.  No wounds.    ENDOCRINE:  No unexplained weight loss.  No polydipsia.  No polyuria.  No polyphagia.    HEMATOLOGIC:  No anemia.  No purpura.  No petechiae.  No prolonged or excessive bleeding.     ALLERGIC AND IMMUNOLOGIC:  No pruritus.  No swelling.       ECG:  ECHOCARDIOGRAM:  RADIOLOGY & ADDITIONAL STUDIES:      LABS:                        11.2   8.21  )-----------( 281      ( 24 Jun 2018 08:30 )             36.0     06-24    141  |  102  |  8<L>  ----------------------------<  209<H>  4.3   |  25  |  0.7    Ca    8.9      24 Jun 2018 08:30  Phos  3.8     06-24  Mg     2.0     06-24    TPro  6.3  /  Alb  3.8  /  TBili  0.4  /  DBili  x   /  AST  17  /  ALT  9   /  AlkPhos  105  06-24    CARDIAC MARKERS ( 24 Jun 2018 08:30 )  x     / <0.01 ng/mL / 49 U/L / x     / x      CARDIAC MARKERS ( 24 Jun 2018 01:41 )  x     / <0.01 ng/mL / 55 U/L / x     / x      CARDIAC MARKERS ( 23 Jun 2018 14:25 )  x     / <0.01 ng/mL / 56 U/L / x     / 1.7 ng/mL      PT/INR - ( 23 Jun 2018 14:25 )   PT: 11.70 sec;   INR: 1.08 ratio         PTT - ( 23 Jun 2018 14:25 )  PTT:35.2 sec  I&O's Summary    BNPSerum Pro-Brain Natriuretic Peptide: 230 pg/mL (06-23 @ 14:25)      ASSESMENT AND PLAN Patient was seen and examined by me in 3C.  EMR reviewed.    Patient is a 82y old  Female who presents with a chief complaint of weakness + vomiting + diarrhea (23 Jun 2018 19:48)      REASON FOR CONSULT     HPI:    Ms. Magy Taylor is an 82-year-old  woman with a past medical history of Hypertension, DM, Hypothyroidism, Dyslipidemia, Depression and CVA.  She states that she has had recurrent episodes of diarrhea associated with nausea and vomiting for the past several days.  She denies any chest pain or shortness of breath with the amount of limited physical activity.   She is referred to cardiology because of abnormal ECG findings.  Her recent MPI showed no ischemia.    She appears comfortable in bed.  She denies any chest pain and shortness of breath.  She admits to having epigastric discomfort.    House Staff Admitting Notes:  83 yo F w/ significant PMH of DM, HTN, hypothyroidism, CVA, depression, anxiety presented with several day history of weakness + vomiting + diarrhea. History obtained mostly from daughter at bedside. History of diarrhea goes back several years. Has been on/off for long time, previously went to GI doctor years ago though were unhappy with care. Report having an EGD as well as colonoscopy years ago, though never followed up on results. Report having associated N+V with the diarrhea occasionally. Not related to any food intake, never any blood in emesis or stool, consistency varies. Denies any recent Abx use, sick contacts, travel, fever, weight changes, or abdominal pain. Does endorse anxiety - sees Psychiatrist @ [facility] regularly (Dr Mclaughlin?).     In ED patient also noted to have new T-wave inversions, no chest pain. (23 Jun 2018 19:48)      PAST MEDICAL & SURGICAL HISTORY:  Vertigo  Hypothyroid  Brain aneurysm  AAA (abdominal aortic aneurysm)  HLD (hyperlipidemia)  HTN (hypertension)  Depression  Anxiety  Panic disorder  DM (diabetes mellitus)  CVA (cerebral vascular accident)  H/O: hysterectomy  Status post cataract surgery          SOCIAL HISTORY:     FAMILY HISTORY:  Family history of breast cancer (Sibling)  Family history of prostate cancer in father (Father)    codeine (Nausea)  IV Contrast (Anaphylaxis)  penicillin (Hives)  shellfish (Unknown)      MEDICATIONS  (STANDING):  aspirin  chewable 81 milliGRAM(s) Oral daily  clopidogrel Tablet 75 milliGRAM(s) Oral daily  dextrose 5%. 1000 milliLiter(s) (50 mL/Hr) IV Continuous <Continuous>  dextrose 50% Injectable 12.5 Gram(s) IV Push once  dextrose 50% Injectable 25 Gram(s) IV Push once  dextrose 50% Injectable 25 Gram(s) IV Push once  enoxaparin Injectable 40 milliGRAM(s) SubCutaneous every 24 hours  gabapentin 300 milliGRAM(s) Oral three times a day  insulin glargine Injectable (LANTUS) 34 Unit(s) SubCutaneous at bedtime  insulin lispro (HumaLOG) corrective regimen sliding scale   SubCutaneous three times a day before meals  insulin lispro Injectable (HumaLOG) 12 Unit(s) SubCutaneous before breakfast  insulin lispro Injectable (HumaLOG) 12 Unit(s) SubCutaneous before lunch  insulin lispro Injectable (HumaLOG) 12 Unit(s) SubCutaneous before dinner  lactated ringers Bolus 1000 milliLiter(s) IV Bolus once  levothyroxine 112 MICROGram(s) Oral daily  metoprolol tartrate 25 milliGRAM(s) Oral two times a day  PARoxetine 40 milliGRAM(s) Oral daily  simvastatin 40 milliGRAM(s) Oral at bedtime  spironolactone 25 milliGRAM(s) Oral daily    MEDICATIONS  (PRN):  dextrose 40% Gel 15 Gram(s) Oral once PRN Blood Glucose LESS THAN 70 milliGRAM(s)/deciliter  glucagon  Injectable 1 milliGRAM(s) IntraMuscular once PRN Glucose LESS THAN 70 milligrams/deciliter  LORazepam     Tablet 0.5 milliGRAM(s) Oral at bedtime PRN Anxiety/Agitation/Sleep  meclizine 12.5 milliGRAM(s) Oral daily PRN Dizziness      Vital Signs Last 24 Hrs  T(C): 36.3 (24 Jun 2018 13:35), Max: 36.3 (24 Jun 2018 13:35)  T(F): 97.3 (24 Jun 2018 13:35), Max: 97.3 (24 Jun 2018 13:35)  HR: 72 (24 Jun 2018 13:35) (72 - 91)  BP: 129/72 (24 Jun 2018 13:35) (129/60 - 173/77)  BP(mean): --  RR: 17 (23 Jun 2018 21:56) (17 - 20)  SpO2: 97% (23 Jun 2018 21:56) (96% - 97%) I&O's Detail    PHYSICAL EXAM:    Constitutional: appears stated age, well developed/nourished, no acute distress  Eyes: EOM's intact.  PERRLA  ENMT:  Normocephalic, atraumatic.  Clear oropharynx.  No ear discharge.  Neck: Jugular veins non-distended; no carotid bruits bilaterally.  Respiratory: respiratory pattern unlabored; no dullness to percussion; lungs clear to auscultation bilaterally.  Cardiovascular: Regular rhythm.  S1 and S2 normal.  No murmur nor rub appreciated.  Abdomen: Soft, non-tender.  Normal bowel sounds.  Extremities: extremities warm; no cyanosis, clubbing or edema.  Skin: No gross abnormalities noted.  Musculoskeletal: No gross deformities  Neurological: Alert, oriented x 3.  No focal neurologic deficits noted.      REVIEW OF SYSTEMS: Negative except as stated in HPI.  Sees a psychiatrist for her depression/anxiety.    ECG: Sinus Rhythm with non-specific ST-T changes.    RADIOLOGY & ADDITIONAL STUDIES:      LABS:                        11.2   8.21  )-----------( 281      ( 24 Jun 2018 08:30 )             36.0     06-24    141  |  102  |  8<L>  ----------------------------<  209<H>  4.3   |  25  |  0.7    Ca    8.9      24 Jun 2018 08:30  Phos  3.8     06-24  Mg     2.0     06-24    TPro  6.3  /  Alb  3.8  /  TBili  0.4  /  DBili  x   /  AST  17  /  ALT  9   /  AlkPhos  105  06-24    CARDIAC MARKERS ( 24 Jun 2018 08:30 )  x     / <0.01 ng/mL / 49 U/L / x     / x      CARDIAC MARKERS ( 24 Jun 2018 01:41 )  x     / <0.01 ng/mL / 55 U/L / x     / x      CARDIAC MARKERS ( 23 Jun 2018 14:25 )  x     / <0.01 ng/mL / 56 U/L / x     / 1.7 ng/mL      PT/INR - ( 23 Jun 2018 14:25 )   PT: 11.70 sec;   INR: 1.08 ratio         PTT - ( 23 Jun 2018 14:25 )  PTT:35.2 sec  I&O's Summary    BNPSerum Pro-Brain Natriuretic Peptide: 230 pg/mL (06-23 @ 14:25)

## 2018-06-25 ENCOUNTER — TRANSCRIPTION ENCOUNTER (OUTPATIENT)
Age: 83
End: 2018-06-25

## 2018-06-25 VITALS — WEIGHT: 192.24 LBS

## 2018-06-25 RX ADMIN — Medication 3: at 11:19

## 2018-06-25 RX ADMIN — Medication 12 UNIT(S): at 11:19

## 2018-06-25 RX ADMIN — CLOPIDOGREL BISULFATE 75 MILLIGRAM(S): 75 TABLET, FILM COATED ORAL at 11:13

## 2018-06-25 RX ADMIN — Medication 25 MILLIGRAM(S): at 17:00

## 2018-06-25 RX ADMIN — Medication 81 MILLIGRAM(S): at 11:13

## 2018-06-25 RX ADMIN — Medication 25 MILLIGRAM(S): at 05:52

## 2018-06-25 RX ADMIN — Medication 12 UNIT(S): at 16:56

## 2018-06-25 RX ADMIN — SPIRONOLACTONE 25 MILLIGRAM(S): 25 TABLET, FILM COATED ORAL at 05:52

## 2018-06-25 RX ADMIN — Medication 12 UNIT(S): at 07:59

## 2018-06-25 RX ADMIN — Medication 1: at 16:56

## 2018-06-25 RX ADMIN — GABAPENTIN 300 MILLIGRAM(S): 400 CAPSULE ORAL at 05:52

## 2018-06-25 RX ADMIN — Medication 40 MILLIGRAM(S): at 11:12

## 2018-06-25 RX ADMIN — Medication 112 MICROGRAM(S): at 05:52

## 2018-06-25 RX ADMIN — Medication 2: at 07:59

## 2018-06-25 RX ADMIN — GABAPENTIN 300 MILLIGRAM(S): 400 CAPSULE ORAL at 13:12

## 2018-06-25 NOTE — DISCHARGE NOTE ADULT - PATIENT PORTAL LINK FT
You can access the [facility] Patient Portal, offered by [facility], by registering with the following website: [email or web address]

## 2018-06-25 NOTE — DISCHARGE NOTE ADULT - CARE PROVIDER_API CALL
Divya Du), EndocrinologyMetabDiabetes; Internal Medicine  [address] [place] [address]  Phone: [phone]  Fax: [phone]

## 2018-06-25 NOTE — DISCHARGE NOTE ADULT - MEDICATION SUMMARY - MEDICATIONS TO TAKE
I will START or STAY ON the medications listed below when I get home from the hospital:    spironolactone 25 mg oral tablet  -- orally once a day  -- Indication: For htn    Aspir 81  -- orally once a day  -- Indication: For cad    Ativan 0.5 mg oral tablet  -- orally once a day (at bedtime)  -- Indication: For Anxiety    gabapentin 300 mg oral capsule  -- orally 3 times a day  -- Indication: For peripheral neuropathy    PARoxetine 40 mg oral tablet  -- 1 tab(s) by mouth once a day  -- Indication: For Depression    HumaLOG 100 units/mL subcutaneous solution  -- with meals 12 units  -- Indication: For Dm    Levemir 100 units/mL subcutaneous solution  -- 34 unit(s) subcutaneous once a day (at bedtime)  -- Indication: For Dm    meclizine 12.5 mg oral tablet  -- 1 tab(s) by mouth once a day  -- Indication: For Dizziness    simvastatin 40 mg oral tablet  -- 1 tab(s) by mouth once a day (at bedtime)  -- Indication: For cad    Plavix 75 mg oral tablet  -- 1 tab(s) by mouth once a day  -- Indication: For cad    metoprolol tartrate 25 mg oral tablet  -- 1 tab(s) by mouth 2 times a day  -- Indication: For cad    amLODIPine 5 mg oral tablet  -- 1 tab(s) by mouth once a day  -- Indication: For htn    levothyroxine 112 mcg (0.112 mg) oral tablet  -- 1 tab(s) by mouth once a day  -- Indication: For hypothyroidism

## 2018-06-25 NOTE — DISCHARGE NOTE ADULT - CARE PLAN
Principal Discharge DX:	Chest pain  Goal:	resolved  Assessment and plan of treatment:	seen by cardio, pain is non cardiac. follow up as outpatient  Secondary Diagnosis:	Chronic diarrhea  Goal:	prevent complications  Assessment and plan of treatment:	follow up with gastroenterologist as outpatient

## 2018-06-25 NOTE — DISCHARGE NOTE ADULT - HOSPITAL COURSE
83 yo female patient with PMHx of DM, HTN, hypothyroidism, CVA, presented because of abdominal pain, nausea, vomiting and diarrhea (chronic), and chest discomfort. seen by cardiology, ecg negative, trops negative, nuclear stress test done around 1-2 months ago was negative. cardio cleared her for discharge. to follow up with GI as outpatient for her chronic diarrhea.

## 2018-06-25 NOTE — DISCHARGE NOTE ADULT - PLAN OF CARE
resolved seen by cardio, pain is non cardiac. follow up as outpatient prevent complications follow up with gastroenterologist as outpatient
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
## 2018-06-25 NOTE — PROGRESS NOTE ADULT - ASSESSMENT
Saw pt earlier. But the chart was typed later. Hydration better . Cardiac enzymes negative  echocardiogram  results pending. Clinically euthyroid on synthroid. Has not had diarrhoea since admission. There could be certain food that she has at home which may be triggering the nausea and diarrhoea.  Diabetes poor. HBAIC > 9%. AM blood sugars high  . euthyroid , heart regular , chest clear.    PLAN. 1. Increase Lantus to 40 u HS.             2.Nutritional counselling.

## 2018-06-29 DIAGNOSIS — Z90.710 ACQUIRED ABSENCE OF BOTH CERVIX AND UTERUS: ICD-10-CM

## 2018-06-29 DIAGNOSIS — Z88.1 ALLERGY STATUS TO OTHER ANTIBIOTIC AGENTS STATUS: ICD-10-CM

## 2018-06-29 DIAGNOSIS — Z79.84 LONG TERM (CURRENT) USE OF ORAL HYPOGLYCEMIC DRUGS: ICD-10-CM

## 2018-06-29 DIAGNOSIS — I10 ESSENTIAL (PRIMARY) HYPERTENSION: ICD-10-CM

## 2018-06-29 DIAGNOSIS — Z91.09 OTHER ALLERGY STATUS, OTHER THAN TO DRUGS AND BIOLOGICAL SUBSTANCES: ICD-10-CM

## 2018-06-29 DIAGNOSIS — I69.393 ATAXIA FOLLOWING CEREBRAL INFARCTION: ICD-10-CM

## 2018-06-29 DIAGNOSIS — E11.42 TYPE 2 DIABETES MELLITUS WITH DIABETIC POLYNEUROPATHY: ICD-10-CM

## 2018-06-29 DIAGNOSIS — Z98.49 CATARACT EXTRACTION STATUS, UNSPECIFIED EYE: ICD-10-CM

## 2018-06-29 DIAGNOSIS — K52.9 NONINFECTIVE GASTROENTERITIS AND COLITIS, UNSPECIFIED: ICD-10-CM

## 2018-06-29 DIAGNOSIS — I71.4 ABDOMINAL AORTIC ANEURYSM, WITHOUT RUPTURE: ICD-10-CM

## 2018-06-29 DIAGNOSIS — R07.9 CHEST PAIN, UNSPECIFIED: ICD-10-CM

## 2018-06-29 DIAGNOSIS — Z91.013 ALLERGY TO SEAFOOD: ICD-10-CM

## 2018-06-29 DIAGNOSIS — F32.9 MAJOR DEPRESSIVE DISORDER, SINGLE EPISODE, UNSPECIFIED: ICD-10-CM

## 2018-06-29 DIAGNOSIS — E03.9 HYPOTHYROIDISM, UNSPECIFIED: ICD-10-CM

## 2018-06-29 DIAGNOSIS — Z88.5 ALLERGY STATUS TO NARCOTIC AGENT: ICD-10-CM

## 2018-06-29 DIAGNOSIS — Z88.0 ALLERGY STATUS TO PENICILLIN: ICD-10-CM

## 2018-07-17 PROBLEM — I67.1 CEREBRAL ANEURYSM, NONRUPTURED: Chronic | Status: ACTIVE | Noted: 2018-03-10

## 2018-07-17 PROBLEM — R42 DIZZINESS AND GIDDINESS: Chronic | Status: ACTIVE | Noted: 2018-03-10

## 2018-07-17 PROBLEM — I71.4 ABDOMINAL AORTIC ANEURYSM, WITHOUT RUPTURE: Chronic | Status: ACTIVE | Noted: 2018-03-10

## 2018-07-17 PROBLEM — E03.9 HYPOTHYROIDISM, UNSPECIFIED: Chronic | Status: ACTIVE | Noted: 2018-03-10

## 2019-01-25 ENCOUNTER — APPOINTMENT (OUTPATIENT)
Dept: VASCULAR SURGERY | Facility: CLINIC | Age: 84
End: 2019-01-25

## 2019-02-01 ENCOUNTER — APPOINTMENT (OUTPATIENT)
Dept: VASCULAR SURGERY | Facility: CLINIC | Age: 84
End: 2019-02-01

## 2019-02-05 NOTE — H&P ADULT - PSH
CHIEF COMPLAINT/HISTORY OF PRESENT ILLNESS: Danette Flores is a 77 year old female who presents for follow-up on multiple medical issues. She does have hypertension, hyperlipidemia, arterial sclerotic heart disease, and type 2 diabetes mellitus. She also has chronic obstructive pulmonary disease. She is due for some blood work. I did place orders. She does get short of breath with any exertion. She had recently been to cardiology and they started her on amlodipine as her blood pressure was quite high..    ALLERGIES:   Allergen Reactions   • Advair Diskus Other (See Comments)     Mouth irritation and loss of enamel on teeth       Current Outpatient Medications   Medication   • losartan (COZAAR) 100 MG tablet   • amLODIPine (NORVASC) 5 MG tablet   • albuterol-ipratropium 2.5 mg/0.5 mg (DUONEB) 0.5-2.5 (3) MG/3ML nebulizer solution   • albuterol (VENTOLIN HFA) 108 (90 Base) MCG/ACT inhaler   • atorvastatin (LIPITOR) 80 MG tablet   • metoPROLOL succinate (TOPROL-XL) 25 MG 24 hr tablet   • spironolactone (ALDACTONE) 25 MG tablet   • ticagrelor (BRILINTA) 90 MG Tab   • budesonide-formoterol (SYMBICORT) 160-4.5 MCG/ACT inhaler   • aspirin 81 MG tablet   • Respiratory Therapy Supplies (AEROBIKA) Device   • albuterol (PROAIR HFA) 108 (90 Base) MCG/ACT inhaler     No current facility-administered medications for this visit.        No visits with results within 30 Day(s) from this visit.   Latest known visit with results is:   Admission on 09/24/2018, Discharged on 09/28/2018   Component Date Value Ref Range Status   • WBC 09/24/2018 13.3* 4.2 - 11.0 K/mcL Final   • RBC 09/24/2018 5.31* 4.00 - 5.20 mil/mcL Final   • HGB 09/24/2018 14.9  12.0 - 15.5 g/dL Final   • HCT 09/24/2018 45.9  36.0 - 46.5 % Final   • MCV 09/24/2018 86.4  78.0 - 100.0 fl Final   • MCH 09/24/2018 28.1  26.0 - 34.0 pg Final   • MCHC 09/24/2018 32.5  32.0 - 36.5 g/dL Final   • RDW-CV 09/24/2018 13.2  11.0 - 15.0 % Final   • PLT 09/24/2018 255  140 - 450  K/mcL Final   • DIFF TYPE 09/24/2018 AUTOMATED DIFFERENTIAL   Final   • Neutrophil 09/24/2018 81  % Final   • LYMPH 09/24/2018 11  % Final   • MONO 09/24/2018 7  % Final   • EOSIN 09/24/2018 1  % Final   • BASO 09/24/2018 0  % Final   • Absolute Neutrophil 09/24/2018 10.7* 1.8 - 7.7 K/mcL Final   • Absolute Lymph 09/24/2018 1.5  1.0 - 4.0 K/mcL Final   • Absolute Mono 09/24/2018 0.9  0.3 - 0.9 K/mcL Final   • Absolute Eos 09/24/2018 0.1  0.1 - 0.5 K/mcL Final   • Absolute Baso 09/24/2018 0.0  0.0 - 0.3 K/mcL Final   • Sodium 09/24/2018 139  135 - 145 mmol/L Final   • Potassium 09/24/2018 4.0  3.4 - 5.1 mmol/L Final   • Chloride 09/24/2018 106  98 - 107 mmol/L Final   • Carbon Dioxide 09/24/2018 22  21 - 32 mmol/L Final   • Anion Gap 09/24/2018 15  10 - 20 mmol/L Final   • Glucose 09/24/2018 230* 65 - 99 mg/dL Final   • BUN 09/24/2018 12  6 - 20 mg/dL Final   • Creatinine 09/24/2018 0.70  0.51 - 0.95 mg/dL Final   • GFR Estimate,  09/24/2018 >90   Final   • GFR Estimate, Non  09/24/2018 84   Final   • BUN/Creatinine Ratio 09/24/2018 17  7 - 25 Final   • CALCIUM 09/24/2018 9.3  8.4 - 10.2 mg/dL Final   • Ventricular Rate EKG/Min (BPM) 09/24/2018 118   Final   • Atrial Rate (BPM) 09/24/2018 118   Final   • [place]-Interval (MSEC) 09/24/2018 148   Final   • QRS-Interval (MSEC) 09/24/2018 72   Final   • QT-Interval (MSEC) 09/24/2018 330   Final   • QTc 09/24/2018 462   Final   • P Axis (Degrees) 09/24/2018 81   Final   • R Axis (Degrees) 09/24/2018 55   Final   • T Axis (Degrees) 09/24/2018 78   Final   • REPORT TEXT 09/24/2018    Final                    Value:.  Sinus tachycardia  Otherwise normal ECG  When compared with ECG of  01-FEB-2018 12:33,  Nonspecific T wave abnormality no longer evident in  Inferior leads  Nonspecific T wave abnormality, worse in  Lateral leads  Confirmed by JUAN AGUILAR MD ([id number]),  Rand Chang ([id number]) on 9/24/2018 2:12:08 PM     • NT proBNP 09/24/2018 248   <451 pg/mL Final   • Sodium POC 09/24/2018 142  135 - 145 mmol/L Final   • Potassium POC 09/24/2018 4.0  3.4 - 5.1 mmol/L Final   • Chloride POC 09/24/2018 106  98 - 107 mmol/L Final   • CALCIUM IONIZED-POC 09/24/2018 1.17  1.15 - 1.29 mmol/L Final   • CO2 Total 09/24/2018 21  19 - 24 mmol/L Final   • GLUCOSE POC 09/24/2018 216* 65 - 99 mg/dL Final   • BUN POC 09/24/2018 12  6 - 20 mg/dL Final   • HEMATOCRIT POC 09/24/2018 45.0  36.0 - 46.5 % Final   • Hemoglobin POC 09/24/2018 15.3  12.0 - 15.5 g/dL Final   • ANION GAP POC 09/24/2018 21  mmol/L Final   • Creatinine POC 09/24/2018 0.60  0.51 - 0.95 mg/dL Final   • Estimated GFR  (PO* 09/24/2018 >90   Final   • Estimated GFR Non-* 09/24/2018 89   Final   • Troponin I POC 09/24/2018 0.88* <0.10 ng/mL Final   • Specimen Description 09/24/2018 SPUTUM SPUTUM   Final   • Gram Smear 09/24/2018 ADEQUATE QUALITY SPECIMEN.  ACUTE INFLAMMATION WITH MODERATE/MANY NEUTROPHILS.  MIXED BACTERIA WITH NO PREDOMINANT TYPE.   Final   • Gram Smear 09/24/2018 MODERATE BRONCHIAL CELLS   Final   • CULTURE 09/24/2018 MANY NORMAL UPPER RESPIRATORY JENNA   Final   • REPORT STATUS 09/24/2018 09/27/2018 FINAL   Final   • PROCALCITONIN 09/24/2018 0.05  <0.10 ng/mL Final   • Hemoglobin A1C 09/24/2018 8.1* 4.5 - 5.6 % Final   • CHOLESTEROL 09/24/2018 176  <200 mg/dL Final   • CALCULATED LDL 09/24/2018 97  <130 mg/dL Final   • HDL 09/24/2018 31* >49 mg/dL Final   • TRIGLYCERIDE 09/24/2018 241* <150 mg/dL Final   • CALCULATED NON HDL 09/24/2018 145  mg/dL Final   • CHOL/HDL 09/24/2018 5.7* <4.5 Final   • Glucose Bedside POC 09/24/2018 194* 65 - 99 mg/dL Final   • Glucose Bedside POC 09/24/2018 206* 65 - 99 mg/dL Final   • Sodium 09/25/2018 139  135 - 145 mmol/L Final   • Potassium 09/25/2018 4.3  3.4 - 5.1 mmol/L Final   • Chloride 09/25/2018 106  98 - 107 mmol/L Final   • Carbon Dioxide 09/25/2018 26  21 - 32 mmol/L Final   • Anion Gap 09/25/2018 11  10 - 20 mmol/L  Final   • Glucose 09/25/2018 218* 65 - 99 mg/dL Final   • BUN 09/25/2018 14  6 - 20 mg/dL Final   • Creatinine 09/25/2018 0.69  0.51 - 0.95 mg/dL Final   • GFR Estimate,  09/25/2018 >90   Final   • GFR Estimate, Non  09/25/2018 85   Final   • BUN/Creatinine Ratio 09/25/2018 20  7 - 25 Final   • CALCIUM 09/25/2018 9.2  8.4 - 10.2 mg/dL Final   • TOTAL BILIRUBIN 09/25/2018 0.3  0.2 - 1.0 mg/dL Final   • AST/SGOT 09/25/2018 24  <38 Units/L Final   • ALT/SGPT 09/25/2018 61  <79 Units/L Final   • ALK PHOSPHATASE 09/25/2018 114  45 - 117 Units/L Final   • TOTAL PROTEIN 09/25/2018 7.3  6.4 - 8.2 g/dL Final   • Albumin 09/25/2018 3.6  3.6 - 5.1 g/dL Final   • GLOBULIN 09/25/2018 3.7  2.0 - 4.0 g/dL Final   • A/G Ratio, Serum 09/25/2018 1.0  1.0 - 2.4 Final   • Glucose Bedside POC 09/25/2018 231* 65 - 99 mg/dL Final   • WBC 09/25/2018 10.3  4.2 - 11.0 K/mcL Final   • RBC 09/25/2018 5.07  4.00 - 5.20 mil/mcL Final   • HGB 09/25/2018 14.6  12.0 - 15.5 g/dL Final   • HCT 09/25/2018 44.1  36.0 - 46.5 % Final   • MCV 09/25/2018 87.0  78.0 - 100.0 fl Final   • MCH 09/25/2018 28.8  26.0 - 34.0 pg Final   • MCHC 09/25/2018 33.1  32.0 - 36.5 g/dL Final   • RDW-CV 09/25/2018 13.5  11.0 - 15.0 % Final   • PLT 09/25/2018 248  140 - 450 K/mcL Final   • DIFF TYPE 09/25/2018 AUTOMATED DIFFERENTIAL   Final   • Neutrophil 09/25/2018 86  % Final   • LYMPH 09/25/2018 11  % Final   • MONO 09/25/2018 3  % Final   • EOSIN 09/25/2018 0  % Final   • BASO 09/25/2018 0  % Final   • Absolute Neutrophil 09/25/2018 8.9* 1.8 - 7.7 K/mcL Final   • Absolute Lymph 09/25/2018 1.1  1.0 - 4.0 K/mcL Final   • Absolute Mono 09/25/2018 0.3  0.3 - 0.9 K/mcL Final   • Absolute Eos 09/25/2018 0.0* 0.1 - 0.5 K/mcL Final   • Absolute Baso 09/25/2018 0.0  0.0 - 0.3 K/mcL Final   • TROPONIN I 09/25/2018 0.91* <0.05 ng/mL Final   • LVOT 2D 09/25/2018 2.1  cm Final   • Ejection Fraction 09/25/2018 45.0  % Final   • AV Peak Velocity  09/25/2018 1.6  m/s Final   • AV Peak Gradient 09/25/2018 10.3  mmHg Final   • Aortic Valve Area 09/25/2018 2.2  cm2 Final   • AV Mean Gradient 09/25/2018 5.2  mmHg Final   • RV Tissue Doppler Free Wall Heart * 09/25/2018 0.2  m/s Final   • Tricuspid Valve Peak Regurgitation* 09/25/2018 2.8  m/s Final   • DOP Calc LVOT Peak Harvey 09/25/2018 1.0  m/s Final   • TV Estimated Right Arterial Pressu* 09/25/2018 3.0  mmHg Final   • LVOT VTI 09/25/2018 17.2  cm Final   • Est Right Vent Systolic Pressure b* 09/25/2018 34.1  mmHg Final   • TROPONIN I 09/25/2018 1.30* <0.05 ng/mL Final   • TROPONIN I 09/25/2018 1.51* <0.05 ng/mL Final   • TROPONIN I 09/25/2018 1.20* <0.05 ng/mL Final   • Glucose Bedside POC 09/25/2018 197* 65 - 99 mg/dL Final   • TSH 09/25/2018 0.212* 0.350 - 5.000 mcUnits/mL Final   • PTT 09/25/2018 25  22 - 30 sec Final   • PTT 09/25/2018 29  22 - 30 sec Final   • Glucose Bedside POC 09/25/2018 236* 65 - 99 mg/dL Final   • Glucose Bedside POC 09/25/2018 196* 65 - 99 mg/dL Final   • PTT 09/26/2018 39* 22 - 30 sec Final   • PTT 09/26/2018 35* 22 - 30 sec Final   • Glucose Bedside POC 09/26/2018 156* 65 - 99 mg/dL Final   • T4, FREE 09/25/2018 1.0  0.8 - 1.5 ng/dL Final   • PTT 09/26/2018 38* 22 - 30 sec Final   • Glucose Bedside POC 09/26/2018 177* 65 - 99 mg/dL Final   • PTT 09/27/2018 44* 22 - 30 sec Final   • Glucose Bedside POC 09/26/2018 216* 65 - 99 mg/dL Final   • PTT 09/27/2018 45* 22 - 30 sec Final   • Glucose Bedside POC 09/27/2018 153* 65 - 99 mg/dL Final   • Sodium 09/27/2018 140  135 - 145 mmol/L Final   • Potassium 09/27/2018 4.3  3.4 - 5.1 mmol/L Final   • Chloride 09/27/2018 108* 98 - 107 mmol/L Final   • Carbon Dioxide 09/27/2018 23  21 - 32 mmol/L Final   • Anion Gap 09/27/2018 13  10 - 20 mmol/L Final   • Glucose 09/27/2018 140* 65 - 99 mg/dL Final   • BUN 09/27/2018 20  6 - 20 mg/dL Final   • Creatinine 09/27/2018 0.76  0.51 - 0.95 mg/dL Final   • GFR Estimate,  09/27/2018 88    Final   • GFR Estimate, Non  09/27/2018 76   Final   • BUN/Creatinine Ratio 09/27/2018 26* 7 - 25 Final   • CALCIUM 09/27/2018 9.0  8.4 - 10.2 mg/dL Final   • CHOLESTEROL 09/27/2018 191  <200 mg/dL Final   • CALCULATED LDL 09/27/2018 103  <130 mg/dL Final   • HDL 09/27/2018 39* >49 mg/dL Final   • TRIGLYCERIDE 09/27/2018 243* <150 mg/dL Final   • CALCULATED NON HDL 09/27/2018 152  mg/dL Final   • CHOL/HDL 09/27/2018 4.9* <4.5 Final   • Activated Clotting Time 09/27/2018 65  Baseline/Target ranges are set by clinicians for each patient/procedure Final   • Activated Clotting Time 09/27/2018 292  Baseline/Target ranges are set by clinicians for each patient/procedure Final   • Platelet P2Y12 09/27/2018 82* 194 - 418 PRU Final   • Glucose Bedside POC 09/27/2018 160* 65 - 99 mg/dL Final   • WBC 09/28/2018 10.5  4.2 - 11.0 K/mcL Final   • RBC 09/28/2018 5.37* 4.00 - 5.20 mil/mcL Final   • HGB 09/28/2018 15.1  12.0 - 15.5 g/dL Final   • HCT 09/28/2018 46.2  36.0 - 46.5 % Final   • MCV 09/28/2018 86.0  78.0 - 100.0 fl Final   • MCH 09/28/2018 28.1  26.0 - 34.0 pg Final   • MCHC 09/28/2018 32.7  32.0 - 36.5 g/dL Final   • RDW-CV 09/28/2018 13.2  11.0 - 15.0 % Final   • PLT 09/28/2018 272  140 - 450 K/mcL Final   • NRBC 09/28/2018 0  0 /100 WBC Final   • Sodium 09/28/2018 138  135 - 145 mmol/L Final   • Potassium 09/28/2018 4.0  3.4 - 5.1 mmol/L Final   • Chloride 09/28/2018 106  98 - 107 mmol/L Final   • Carbon Dioxide 09/28/2018 24  21 - 32 mmol/L Final   • Anion Gap 09/28/2018 12  10 - 20 mmol/L Final   • Glucose 09/28/2018 152* 65 - 99 mg/dL Final   • BUN 09/28/2018 19  6 - 20 mg/dL Final   • Creatinine 09/28/2018 0.70  0.51 - 0.95 mg/dL Final   • GFR Estimate,  09/28/2018 >90   Final   • GFR Estimate, Non  09/28/2018 84   Final   • BUN/Creatinine Ratio 09/28/2018 27* 7 - 25 Final   • CALCIUM 09/28/2018 9.4  8.4 - 10.2 mg/dL Final   • Glucose Bedside POC 09/27/2018 129* 65  - 99 mg/dL Final   • Glucose Bedside POC 09/28/2018 131* 65 - 99 mg/dL Final       PHYSICAL EXAMINATION:  General: Awake, alert and oriented and in no acute distress.  Vitals:   Visit Vitals  /72   Pulse 92   Wt 80.5 kg   SpO2 98%   BMI 31.44 kg/m²     Lungs: Decreased breath sounds without rales or rhonchi  Cardiovascular: Regular rate and rhythm without murmur rub or gallop  Abdomen: Soft, nontender, nondistended, no hepatosplenomegaly, normal active bowel sounds and no masses palpated.  Extremities: No erythema, induration, no evidence of joint effusion, ROM (range of motion) of all joints is normal.  Neurologic: Awake, alert, oriented x3.  Skin: Color, texture, turgor are normal. No bruises, rashes or lesions.    Danette was seen today for diabetes, hypertension, lipids and copd.    Diagnoses and all orders for this visit:    Type 2 diabetes mellitus without complication, without long-term current use of insulin (CMS/[facility])-labs ordered.  -     GLYCOHEMOGLOBIN; Standing  -     MICROALBUMIN URINE RANDOM; Standing    COPD with asthma (CMS/[facility])-does follow-up with pulmonary. No change in treatment.    ASHD (arteriosclerotic heart disease)-check labs.  -     COMPREHENSIVE METABOLIC PANEL; Standing  -     LIPID PANEL WITH REFLEX; Standing    Essential hypertension-continue current medication. Recheck in 3 months.  -     CBC & AUTO DIFFERENTIAL; Standing    Other orders  -     losartan (COZAAR) 100 MG tablet; Take 1 tablet by mouth daily.     -       H/O: hysterectomy    Status post cataract surgery

## 2019-02-20 ENCOUNTER — APPOINTMENT (OUTPATIENT)
Dept: VASCULAR SURGERY | Facility: CLINIC | Age: 84
End: 2019-02-20

## 2019-03-27 ENCOUNTER — INPATIENT (INPATIENT)
Facility: HOSPITAL | Age: 84
LOS: 4 days | Discharge: HOME HEALTH | End: 2019-04-01
Attending: INTERNAL MEDICINE | Admitting: INTERNAL MEDICINE
Payer: MEDICARE

## 2019-03-27 VITALS
TEMPERATURE: 98 F | DIASTOLIC BLOOD PRESSURE: 67 MMHG | HEART RATE: 52 BPM | SYSTOLIC BLOOD PRESSURE: 127 MMHG | OXYGEN SATURATION: 100 %

## 2019-03-27 DIAGNOSIS — Z98.49 CATARACT EXTRACTION STATUS, UNSPECIFIED EYE: Chronic | ICD-10-CM

## 2019-03-27 DIAGNOSIS — Z98.890 OTHER SPECIFIED POSTPROCEDURAL STATES: Chronic | ICD-10-CM

## 2019-03-27 LAB
ALBUMIN SERPL ELPH-MCNC: 4.5 G/DL — SIGNIFICANT CHANGE UP (ref 3.5–5.2)
ALP SERPL-CCNC: 124 U/L — HIGH (ref 30–115)
ALT FLD-CCNC: 7 U/L — SIGNIFICANT CHANGE UP (ref 0–41)
ANION GAP SERPL CALC-SCNC: 15 MMOL/L — HIGH (ref 7–14)
APPEARANCE UR: ABNORMAL
APTT BLD: 35.4 SEC — SIGNIFICANT CHANGE UP (ref 27–39.2)
AST SERPL-CCNC: 12 U/L — SIGNIFICANT CHANGE UP (ref 0–41)
BASE EXCESS BLDV CALC-SCNC: 5.2 MMOL/L — HIGH (ref -2–2)
BASOPHILS # BLD AUTO: 0.08 K/UL — SIGNIFICANT CHANGE UP (ref 0–0.2)
BASOPHILS NFR BLD AUTO: 0.5 % — SIGNIFICANT CHANGE UP (ref 0–1)
BILIRUB DIRECT SERPL-MCNC: <0.2 MG/DL — SIGNIFICANT CHANGE UP (ref 0–0.2)
BILIRUB INDIRECT FLD-MCNC: >0.3 MG/DL — SIGNIFICANT CHANGE UP (ref 0.2–1.2)
BILIRUB SERPL-MCNC: 0.5 MG/DL — SIGNIFICANT CHANGE UP (ref 0.2–1.2)
BILIRUB UR-MCNC: ABNORMAL
BUN SERPL-MCNC: 17 MG/DL — SIGNIFICANT CHANGE UP (ref 10–20)
CA-I SERPL-SCNC: 1.21 MMOL/L — SIGNIFICANT CHANGE UP (ref 1.12–1.3)
CALCIUM SERPL-MCNC: 10.3 MG/DL — HIGH (ref 8.5–10.1)
CHLORIDE SERPL-SCNC: 96 MMOL/L — LOW (ref 98–110)
CO2 SERPL-SCNC: 27 MMOL/L — SIGNIFICANT CHANGE UP (ref 17–32)
COLOR SPEC: YELLOW — SIGNIFICANT CHANGE UP
CREAT SERPL-MCNC: 0.9 MG/DL — SIGNIFICANT CHANGE UP (ref 0.7–1.5)
DIFF PNL FLD: NEGATIVE — SIGNIFICANT CHANGE UP
EOSINOPHIL # BLD AUTO: 0.16 K/UL — SIGNIFICANT CHANGE UP (ref 0–0.7)
EOSINOPHIL NFR BLD AUTO: 1 % — SIGNIFICANT CHANGE UP (ref 0–8)
EPI CELLS # UR: ABNORMAL /HPF
GAS PNL BLDV: 137 MMOL/L — SIGNIFICANT CHANGE UP (ref 136–145)
GAS PNL BLDV: SIGNIFICANT CHANGE UP
GLUCOSE SERPL-MCNC: 178 MG/DL — HIGH (ref 70–99)
GLUCOSE UR QL: NEGATIVE MG/DL — SIGNIFICANT CHANGE UP
HCO3 BLDV-SCNC: 31 MMOL/L — HIGH (ref 22–29)
HCT VFR BLD CALC: 42.6 % — SIGNIFICANT CHANGE UP (ref 37–47)
HCT VFR BLDA CALC: 42.9 % — SIGNIFICANT CHANGE UP (ref 34–44)
HGB BLD CALC-MCNC: 14 G/DL — SIGNIFICANT CHANGE UP (ref 14–18)
HGB BLD-MCNC: 13.4 G/DL — SIGNIFICANT CHANGE UP (ref 12–16)
IMM GRANULOCYTES NFR BLD AUTO: 0.7 % — HIGH (ref 0.1–0.3)
INR BLD: 1.09 RATIO — SIGNIFICANT CHANGE UP (ref 0.65–1.3)
KETONES UR-MCNC: NEGATIVE — SIGNIFICANT CHANGE UP
LACTATE BLDV-MCNC: 1.5 MMOL/L — SIGNIFICANT CHANGE UP (ref 0.5–1.6)
LACTATE SERPL-SCNC: 1.6 MMOL/L — SIGNIFICANT CHANGE UP (ref 0.5–2.2)
LEUKOCYTE ESTERASE UR-ACNC: ABNORMAL
LIDOCAIN IGE QN: 29 U/L — SIGNIFICANT CHANGE UP (ref 7–60)
LYMPHOCYTES # BLD AUTO: 1.46 K/UL — SIGNIFICANT CHANGE UP (ref 1.2–3.4)
LYMPHOCYTES # BLD AUTO: 9.4 % — LOW (ref 20.5–51.1)
MAGNESIUM SERPL-MCNC: 2 MG/DL — SIGNIFICANT CHANGE UP (ref 1.8–2.4)
MCHC RBC-ENTMCNC: 24 PG — LOW (ref 27–31)
MCHC RBC-ENTMCNC: 31.5 G/DL — LOW (ref 32–37)
MCV RBC AUTO: 76.3 FL — LOW (ref 81–99)
MONOCYTES # BLD AUTO: 0.9 K/UL — HIGH (ref 0.1–0.6)
MONOCYTES NFR BLD AUTO: 5.8 % — SIGNIFICANT CHANGE UP (ref 1.7–9.3)
NEUTROPHILS # BLD AUTO: 12.81 K/UL — HIGH (ref 1.4–6.5)
NEUTROPHILS NFR BLD AUTO: 82.6 % — HIGH (ref 42.2–75.2)
NITRITE UR-MCNC: NEGATIVE — SIGNIFICANT CHANGE UP
NRBC # BLD: 0 /100 WBCS — SIGNIFICANT CHANGE UP (ref 0–0)
PCO2 BLDV: 47 MMHG — SIGNIFICANT CHANGE UP (ref 41–51)
PH BLDV: 7.42 — SIGNIFICANT CHANGE UP (ref 7.26–7.43)
PH UR: 6 — SIGNIFICANT CHANGE UP (ref 5–8)
PLATELET # BLD AUTO: 370 K/UL — SIGNIFICANT CHANGE UP (ref 130–400)
PO2 BLDV: 28 MMHG — SIGNIFICANT CHANGE UP (ref 20–40)
POTASSIUM BLDV-SCNC: 4.3 MMOL/L — SIGNIFICANT CHANGE UP (ref 3.3–5.6)
POTASSIUM SERPL-MCNC: 4.6 MMOL/L — SIGNIFICANT CHANGE UP (ref 3.5–5)
POTASSIUM SERPL-SCNC: 4.6 MMOL/L — SIGNIFICANT CHANGE UP (ref 3.5–5)
PROT SERPL-MCNC: 7.4 G/DL — SIGNIFICANT CHANGE UP (ref 6–8)
PROT UR-MCNC: ABNORMAL MG/DL
PROTHROM AB SERPL-ACNC: 12.5 SEC — SIGNIFICANT CHANGE UP (ref 9.95–12.87)
RBC # BLD: 5.58 M/UL — HIGH (ref 4.2–5.4)
RBC # FLD: 15.4 % — HIGH (ref 11.5–14.5)
RBC CASTS # UR COMP ASSIST: SIGNIFICANT CHANGE UP /HPF
SAO2 % BLDV: 50 % — SIGNIFICANT CHANGE UP
SODIUM SERPL-SCNC: 138 MMOL/L — SIGNIFICANT CHANGE UP (ref 135–146)
SP GR SPEC: 1.02 — SIGNIFICANT CHANGE UP (ref 1.01–1.03)
TROPONIN T SERPL-MCNC: <0.01 NG/ML — SIGNIFICANT CHANGE UP
UROBILINOGEN FLD QL: 1 MG/DL (ref 0.2–0.2)
WBC # BLD: 15.52 K/UL — HIGH (ref 4.8–10.8)
WBC # FLD AUTO: 15.52 K/UL — HIGH (ref 4.8–10.8)
WBC UR QL: SIGNIFICANT CHANGE UP /HPF

## 2019-03-27 RX ORDER — IOHEXOL 300 MG/ML
30 INJECTION, SOLUTION INTRAVENOUS ONCE
Qty: 0 | Refills: 0 | Status: COMPLETED | OUTPATIENT
Start: 2019-03-27 | End: 2019-03-27

## 2019-03-27 RX ORDER — ONDANSETRON 8 MG/1
4 TABLET, FILM COATED ORAL ONCE
Qty: 0 | Refills: 0 | Status: COMPLETED | OUTPATIENT
Start: 2019-03-27 | End: 2019-03-27

## 2019-03-27 RX ORDER — SODIUM CHLORIDE 9 MG/ML
1000 INJECTION, SOLUTION INTRAVENOUS
Qty: 0 | Refills: 0 | Status: DISCONTINUED | OUTPATIENT
Start: 2019-03-27 | End: 2019-03-31

## 2019-03-27 RX ORDER — FAMOTIDINE 10 MG/ML
20 INJECTION INTRAVENOUS ONCE
Qty: 0 | Refills: 0 | Status: COMPLETED | OUTPATIENT
Start: 2019-03-27 | End: 2019-03-27

## 2019-03-27 RX ADMIN — FAMOTIDINE 20 MILLIGRAM(S): 10 INJECTION INTRAVENOUS at 15:17

## 2019-03-27 RX ADMIN — ONDANSETRON 4 MILLIGRAM(S): 8 TABLET, FILM COATED ORAL at 15:18

## 2019-03-27 RX ADMIN — SODIUM CHLORIDE 125 MILLILITER(S): 9 INJECTION, SOLUTION INTRAVENOUS at 15:18

## 2019-03-27 RX ADMIN — IOHEXOL 30 MILLILITER(S): 300 INJECTION, SOLUTION INTRAVENOUS at 15:32

## 2019-03-27 NOTE — ED PROVIDER NOTE - PROGRESS NOTE DETAILS
CT as above. Case d/w surgery, to come and eval pt.  Pt vomited in ER earlier, no vomiting for past few hours. Pt seen and eval by surgery, no further vomiting in ER, they feel pt with partial sbo.  recommend IVF, NPO and will follow and re-eval.  Case d/w pt's PMD, Florian Sim, he accepts admission to his service. Pt seen and eval by surgery, no further vomiting in ER, they feel pt with partial sbo.  recommend IVF, NPO, no NGT for now,  and will follow and re-eval.  Case d/w pt's PMD, Florian Sim, he accepts admission to his service.

## 2019-03-27 NOTE — ED ADULT NURSE REASSESSMENT NOTE - NS ED NURSE REASSESS COMMENT FT1
Pt not in assigned area of ED
pt received from previous nurse, pt reports feeling much better. ambulated to the bathroom well. pending results. will continue care.

## 2019-03-27 NOTE — ED PROVIDER NOTE - NS ED ROS FT
Constitutional:  no fevers, no chills, no malaise  Eyes:  No visual changes  ENMT: No neck pain or stiffness, no nasal congestion, no ear pain, no throat pain  Cardiac:  No chest pain  Respiratory:  No cough or sob  GI:  see hpi  :  No dysuria, frequency or burning.  MS:  No back pain, no joint pain.  Neuro:  No headache, no dizziness, no change in mental status  Skin:  No skin rash  Except as documented in the HPI,  all other systems are negative

## 2019-03-27 NOTE — PHARMACOTHERAPY INTERVENTION NOTE - COMMENTS
Proscriber was contacted to clarify allergy to IV dye.  Provider confirmed patient received PO contrast before.

## 2019-03-27 NOTE — H&P ADULT - NSICDXFAMILYHX_GEN_ALL_CORE_FT
FAMILY HISTORY:  Father  Still living? Unknown  Family history of prostate cancer in father, Age at diagnosis: Age Unknown    Sibling  Still living? No  Family history of breast cancer, Age at diagnosis: Age Unknown

## 2019-03-27 NOTE — H&P ADULT - NSHPPHYSICALEXAM_GEN_ALL_CORE
PHYSICAL EXAM:  GEN: No acute distress  HEENT: EOMI. No sclera icterus. Pupils are equal and reactive to light b/l.   LUNGS: Clear to auscultation bilaterally.   HEART: S1/S2 present. RRR. No murmur/gallop/rubs.    ABD: Soft, non-tender, non-distended. Bowel sounds present  EXT: No pitting edema.   NEURO: AAOX3 PHYSICAL EXAM:  GEN: No acute distress  HEENT: EOMI. No sclera icterus. Pupils are equal and reactive to light b/l.   LUNGS: Clear to auscultation bilaterally. No wheeze/crackles/rales.   HEART: S1/S2 present. RRR. No murmur/gallop/rubs.    ABD: Soft, non-tender, non-distended. Bowel sounds present.   EXT: No pitting edema.   NEURO: AAOX3. Non focal.

## 2019-03-27 NOTE — H&P ADULT - HISTORY OF PRESENT ILLNESS
84 y/o female with h/o HTN, HLD, anxiety/depression, CVA, AAA, hypothyroid, in ER with c/o abd pain.  Started this morning, diffusely over abdomen, waxing and waning, lasting a few minutes at a time, sharp.  +N. no V/D.  Having BM's today.  No dysuria.  no back pain.  no CP/SOB.  no ha/dizziness/loc. 82 yo F with hx of ileus from gastroparesis HTN, HLD, Anxiety/Depression, CVA (5 yrs ago), AAA and Hypothyroidism presents to ED for 2 days hx of abdominal pain. Patient reports that she has been having nausea and vomiting for the past few weeks and has to carry buckets around to vomit.  2days ago, she started feeling intermittent diffuse crampy abdominal pain which is progressively worsening. So her PMD urges her to come to ED. Denied fever, chills, chest pain, SOB, change in bowel movement, change in urination.   Currently, patient reports that her abdominal pain and N/V has resolved. Last BM tonight.     In ED, patient was found to have SBO obstruction on CT abdomen/pelvic. Surgery was consulted and recommended conservative management for now.

## 2019-03-27 NOTE — ED ADULT NURSE NOTE - NSIMPLEMENTINTERV_GEN_ALL_ED
Duration Of Freeze Thaw-Cycle (Seconds): 3 Number Of Freeze-Thaw Cycles: 1 freeze-thaw cycle Render Post-Care Instructions In Note?: yes Detail Level: Zone Consent: The patient's consent was obtained including but not limited to risks of crusting, scabbing, blistering, scarring, darker or lighter pigmentary change, recurrence, incomplete removal and infection. Post-Care Instructions: I reviewed with the patient in detail post-care instructions. Patient is to wear sunprotection, and avoid picking at any of the treated lesions. Pt may apply Vaseline to crusted or scabbing areas. Implemented All Fall Risk Interventions:  [place] to call system. Call bell, personal items and telephone within reach. Instruct patient to call for assistance. Room bathroom lighting operational. Non-slip footwear when patient is off stretcher. Physically safe environment: no spills, clutter or unnecessary equipment. Stretcher in lowest position, wheels locked, appropriate side rails in place. Provide visual cue, wrist band, yellow gown, etc. Monitor gait and stability. Monitor for mental status changes and reorient to person, place, and time. Review medications for side effects contributing to fall risk. Reinforce activity limits and safety measures with patient and family.

## 2019-03-27 NOTE — H&P ADULT - NSICDXPASTMEDICALHX_GEN_ALL_CORE_FT
PAST MEDICAL HISTORY:  AAA (abdominal aortic aneurysm)     Anxiety     Brain aneurysm     CVA (cerebral vascular accident)     Depression     DM (diabetes mellitus)     HLD (hyperlipidemia)     HTN (hypertension)     Hypothyroid     Panic disorder     Vertigo

## 2019-03-27 NOTE — ED PROVIDER NOTE - PHYSICAL EXAMINATION
CONSTITUTIONAL: Well-developed; well-nourished; in no acute distress, nontoxic appearing  SKIN: skin exam is warm and dry,  HEAD: Normocephalic; atraumatic.  EYES: PERRL, 3 mm bilateral, no nystagmus, EOM intact; conjunctiva and sclera clear.  ENT: MMM, no nasal congestion  NECK: Supple; non tender.+ full passive ROM in all directions. No JVD  CARD: S1, S2 normal, no murmur  RESP: No wheezes, rales or rhonchi. Good air movement bilaterally  ABD: soft; non-distended; mild diffuse tenderness. nabs. No Rebound, No guarding  EXT: Normal ROM. No cyanosis or edema. Dp Pulses intact.   NEURO: awake, alert, following commands, oriented, grossly unremarkable. No Focal deficits. GCS 15.   PSYCH: Cooperative, appropriate.

## 2019-03-27 NOTE — H&P ADULT - ASSESSMENT
84 y/o female with h/o HTN, HLD, anxiety/depression, CVA, AAA, hypothyroid, in ER with c/o abd pain.  Started this morning, diffusely over abdomen, waxing and waning, lasting a few minutes at a time, sharp. +N. no V/D.  Having BM's today.  No dysuria.  no back pain.  no CP/SOB.  no ha/dizziness/loc.       Abdominal pain with nausea likely 2/2 small bowel obstruction   - CT abdomen/pelvic: Small bowel dilatation primarily at level of the jejunum with mesenteric congestion. Differential considerations favor mechanical small   bowel obstruction; no transition point. Right middle lobe nodule measuring 0.6 cm, according to Fleischner's criteria follow-up CT is recommended in 6-12 months and consider further   follow-up at 18-24 months. Stable lower abdominal aortic aneurysm just above the level of the bifurcation measuring 4 cm.  - start on IVF LR @ 75cc/hr  - NPO for now  - Zofran prn for nausea  - no need for NGT as patient is not vomiting currently.   - f/u Surgery     Pulmonary nodule   - CT abdo/pelvic: Right middle lobe nodule measuring 0.6 cm.   - f/u outpatient.     HTN  - c/w     HLD  - c/w statin    DM   - c/w insulin regimen (monitor FS)    Hypothyroidism  - c/w synthroid     Anxiety/Depression  - c/w     AAA  - CT abdo/pelvic: Stable lower abdominal aortic aneurysm just above the level of the bifurcation measuring 4 cm.  - follow up outpatient.       DVT ppx: Lovenox SC  GI ppx: not indicated.   Activity: OOBC  Diet: NPO for now 84 yo F with hx of ileus from gastroparesis HTN, HLD, Anxiety/Depression, CVA (5 yrs ago), AAA and Hypothyroidism presents to ED for 2 days hx of abdominal pain. Patient reports that she has been having nausea and vomiting for the past few weeks and has to carry buckets around to vomit.  Two days ago, she started feeling intermittent diffuse crampy abdominal pain which is progressively worsening. So her PMD urges her to come to ED. Denied fever, chills, chest pain, SOB, change in bowel movement, change in urination.   Currently, patient reports that her abdominal pain and N/V has resolved. Last BM tonight.     In ED, patient was found to have SBO obstruction on CT abdomen/pelvic. Surgery was consulted and recommended conservative management for now.      Abdominal pain with nausea likely 2/2 small bowel obstruction   - CT abdomen/pelvic: Small bowel dilatation primarily at level of the jejunum with mesenteric congestion. Differential considerations favor mechanical small   bowel obstruction; no transition point. Right middle lobe nodule measuring 0.6 cm, according to Fleischner's criteria follow-up CT is recommended in 6-12 months and consider further   follow-up at 18-24 months. Stable lower abdominal aortic aneurysm just above the level of the bifurcation measuring 4 cm.  - start on IVF LR @ 75cc/hr  - NPO for now  - Zofran prn for nausea  - no need for NGT as patient is not nausea/vomiting currently.   - f/u Surgery     Pulmonary nodule   - CT abdo/pelvic: Right middle lobe nodule measuring 0.6 cm.   - f/u outpatient.     HTN  - c/w home meds.     HLD  - c/w statin    DM   - c/w insulin regimen (monitor FS)    Hypothyroidism  - c/w synthroid     Anxiety/Depression  - c/w Paroxetine     AAA  - CT abdo/pelvic: Stable lower abdominal aortic aneurysm just above the level of the bifurcation measuring 4 cm.  - follow up outpatient.       Medication reconciliation  - Please confirm medication with daughter in AM. [facility]. Patient does not remember all her medications       DVT ppx: Lovenox SC  GI ppx: not indicated.   Activity: OOBC  Diet: NPO for now 84 yo F with hx of ileus from gastroparesis HTN, HLD, Anxiety/Depression, CVA (5 yrs ago), AAA and Hypothyroidism presents to ED for 2 days hx of abdominal pain. Patient reports that she has been having nausea and vomiting for the past few weeks and has to carry buckets around to vomit.  Two days ago, she started feeling intermittent diffuse crampy abdominal pain which is progressively worsening. So her PMD urges her to come to ED. Denied fever, chills, chest pain, SOB, change in bowel movement, change in urination.   Currently, patient reports that her abdominal pain and N/V has resolved. Last BM tonight.     In ED, patient was found to have SBO obstruction on CT abdomen/pelvic. Surgery was consulted and recommended conservative management for now.      Abdominal pain with nausea likely 2/2 small bowel obstruction   - CT abdomen/pelvic: Small bowel dilatation primarily at level of the jejunum with mesenteric congestion. Differential considerations favor mechanical small   bowel obstruction; no transition point. Right middle lobe nodule measuring 0.6 cm, according to Fleischner's criteria follow-up CT is recommended in 6-12 months and consider further   follow-up at 18-24 months. Stable lower abdominal aortic aneurysm just above the level of the bifurcation measuring 4 cm.  - start on IVF LR @ 75cc/hr  - NPO for now  - Zofran prn for nausea  - no need for NGT as patient is not nausea/vomiting currently.   - f/u Surgery     Pulmonary nodule   - CT abdo/pelvic: Right middle lobe nodule measuring 0.6 cm.   - f/u outpatient.     HTN  - c/w home meds.     HLD  - c/w statin    DM   - c/w insulin regimen (monitor FS)    Hypothyroidism  - c/w synthroid     Anxiety/Depression  - c/w Paroxetine     AAA  - CT abdo/pelvic: Stable lower abdominal aortic aneurysm just above the level of the bifurcation measuring 4 cm.  - follow up outpatient.       Medication reconciliation  - Please confirm medication with daughter in AM. [facility]. Patient does not remember all her medications       DVT ppx: Lovenox SC  GI ppx: PPI  Activity: OOBC  Diet: NPO for now

## 2019-03-27 NOTE — H&P ADULT - NSHPLABSRESULTS_GEN_ALL_CORE
13.4   15.52 )-----------( 370      ( 27 Mar 2019 14:45 )             42.6                 138  |  96<L>  |  17  ----------------------------<  178<H>  4.6   |  27  |  0.9    Ca    10.3<H>      27 Mar 2019 14:45  Mg     2.0         TPro  7.4  /  Alb  4.5  /  TBili  0.5  /  DBili  <0.2  /  AST  12  /  ALT  7   /  AlkPhos  124<H>      LIVER FUNCTIONS - ( 27 Mar 2019 14:45 )  Alb: 4.5 g/dL / Pro: 7.4 g/dL / ALK PHOS: 124 U/L / ALT: 7 U/L / AST: 12 U/L / GGT: x         PT/INR - ( 27 Mar 2019 14:45 )   PT: 12.50 sec;   INR: 1.09 ratio         PTT - ( 27 Mar 2019 14:45 )  PTT:35.4 sec  CARDIAC MARKERS ( 27 Mar 2019 14:45 )  x     / <0.01 ng/mL / x     / x     / x            Urinalysis Basic - ( 27 Mar 2019 14:00 )    Color: Yellow / Appearance: Cloudy / S.025 / pH: x  Gluc: x / Ketone: Negative  / Bili: Small / Urobili: 1.0 mg/dL   Blood: x / Protein: Trace mg/dL / Nitrite: Negative   Leuk Esterase: Small / RBC: 1-2 /HPF / WBC 3-5 /HPF   Sq Epi: x / Non Sq Epi: Few /HPF / Bacteria: x

## 2019-03-27 NOTE — ED PROVIDER NOTE - OBJECTIVE STATEMENT
82 y/o female with h/o HTN, HLD, anxiety/depression, CVA, AAA, hypothyroid, in ER with c/o abd pain.  Started this morning, diffusely over abdomen, waxing and waning, lasting a few minutes at a time, sharp.  +N. V/D.  Having BM's today.  No dysuria.  no back pain.  no CP/SOB.  no ha/dizziness/loc. 82 y/o female with h/o HTN, HLD, anxiety/depression, CVA, AAA, hypothyroid, in ER with c/o abd pain.  Started this morning, diffusely over abdomen, waxing and waning, lasting a few minutes at a time, sharp.  +N. no V/D.  Having BM's today.  No dysuria.  no back pain.  no CP/SOB.  no ha/dizziness/loc.

## 2019-03-27 NOTE — H&P ADULT - NSHPREVIEWOFSYSTEMS_GEN_ALL_CORE
REVIEW OF SYSTEMS:    CONSTITUTIONAL: No weakness, fevers or chills  EYES/ENT: No visual changes;  No vertigo or throat pain   NECK: No pain or stiffness.   RESPIRATORY: No cough, wheezing, hemoptysis; No shortness of breath  CARDIOVASCULAR: No chest pain or palpitations  GASTROINTESTINAL: No abdominal or epigastric pain. No nausea, vomiting, or hematemesis; No diarrhea or constipation. No melena or hematochezia.  GENITOURINARY: No dysuria, frequency or hematuria  NEUROLOGICAL: No numbness or weakness  SKIN: No itching, rashes REVIEW OF SYSTEMS:    CONSTITUTIONAL: No weakness, fevers or chills  EYES/ENT: No visual changes;  No vertigo or throat pain   NECK: No pain or stiffness.   RESPIRATORY: No cough, wheezing, hemoptysis; No shortness of breath  CARDIOVASCULAR: No chest pain or palpitations  GASTROINTESTINAL: No current abdominal or epigastric pain. No curent nausea, vomiting, or hematemesis; No diarrhea or constipation. No melena or hematochezia.  GENITOURINARY: No dysuria, frequency or hematuria  NEUROLOGICAL: No numbness or weakness  SKIN: No itching, rashes

## 2019-03-27 NOTE — ED PROVIDER NOTE - CLINICAL SUMMARY MEDICAL DECISION MAKING FREE TEXT BOX
84 y/o female with mult medical problems, in ER with c/o abd pain.  wbc 15K, lactate normal.  CT abd with possible SBO.  Seen and eval by surg, they feel pt with partial sbo, recommend npo, ivf, no ngt for now (not currently vomiting).  Pt admitted to PMD, Dr Du, and surgery will follow.

## 2019-03-28 ENCOUNTER — TRANSCRIPTION ENCOUNTER (OUTPATIENT)
Age: 84
End: 2019-03-28

## 2019-03-28 LAB
ALBUMIN SERPL ELPH-MCNC: 4 G/DL — SIGNIFICANT CHANGE UP (ref 3.5–5.2)
ALP SERPL-CCNC: 105 U/L — SIGNIFICANT CHANGE UP (ref 30–115)
ALT FLD-CCNC: 6 U/L — SIGNIFICANT CHANGE UP (ref 0–41)
ANION GAP SERPL CALC-SCNC: 17 MMOL/L — HIGH (ref 7–14)
AST SERPL-CCNC: 11 U/L — SIGNIFICANT CHANGE UP (ref 0–41)
BASOPHILS # BLD AUTO: 0.04 K/UL — SIGNIFICANT CHANGE UP (ref 0–0.2)
BASOPHILS NFR BLD AUTO: 0.5 % — SIGNIFICANT CHANGE UP (ref 0–1)
BILIRUB SERPL-MCNC: 0.6 MG/DL — SIGNIFICANT CHANGE UP (ref 0.2–1.2)
BUN SERPL-MCNC: 22 MG/DL — HIGH (ref 10–20)
CALCIUM SERPL-MCNC: 9.3 MG/DL — SIGNIFICANT CHANGE UP (ref 8.5–10.1)
CHLORIDE SERPL-SCNC: 96 MMOL/L — LOW (ref 98–110)
CHOLEST SERPL-MCNC: 168 MG/DL — SIGNIFICANT CHANGE UP (ref 100–200)
CO2 SERPL-SCNC: 25 MMOL/L — SIGNIFICANT CHANGE UP (ref 17–32)
CREAT SERPL-MCNC: 1.2 MG/DL — SIGNIFICANT CHANGE UP (ref 0.7–1.5)
CULTURE RESULTS: SIGNIFICANT CHANGE UP
EOSINOPHIL # BLD AUTO: 0.01 K/UL — SIGNIFICANT CHANGE UP (ref 0–0.7)
EOSINOPHIL NFR BLD AUTO: 0.1 % — SIGNIFICANT CHANGE UP (ref 0–8)
ESTIMATED AVERAGE GLUCOSE: 192 MG/DL — HIGH (ref 68–114)
GLUCOSE BLDC GLUCOMTR-MCNC: 153 MG/DL — HIGH (ref 70–99)
GLUCOSE BLDC GLUCOMTR-MCNC: 154 MG/DL — HIGH (ref 70–99)
GLUCOSE BLDC GLUCOMTR-MCNC: 195 MG/DL — HIGH (ref 70–99)
GLUCOSE BLDC GLUCOMTR-MCNC: 292 MG/DL — HIGH (ref 70–99)
GLUCOSE SERPL-MCNC: 290 MG/DL — HIGH (ref 70–99)
HBA1C BLD-MCNC: 8.3 % — HIGH (ref 4–5.6)
HCT VFR BLD CALC: 42.1 % — SIGNIFICANT CHANGE UP (ref 37–47)
HDLC SERPL-MCNC: 37 MG/DL — LOW
HGB BLD-MCNC: 13.2 G/DL — SIGNIFICANT CHANGE UP (ref 12–16)
IMM GRANULOCYTES NFR BLD AUTO: 0.2 % — SIGNIFICANT CHANGE UP (ref 0.1–0.3)
LACTATE SERPL-SCNC: 2.7 MMOL/L — HIGH (ref 0.5–2.2)
LIPID PNL WITH DIRECT LDL SERPL: 113 MG/DL — SIGNIFICANT CHANGE UP (ref 4–129)
LYMPHOCYTES # BLD AUTO: 1.31 K/UL — SIGNIFICANT CHANGE UP (ref 1.2–3.4)
LYMPHOCYTES # BLD AUTO: 14.8 % — LOW (ref 20.5–51.1)
MAGNESIUM SERPL-MCNC: 1.8 MG/DL — SIGNIFICANT CHANGE UP (ref 1.8–2.4)
MCHC RBC-ENTMCNC: 24 PG — LOW (ref 27–31)
MCHC RBC-ENTMCNC: 31.4 G/DL — LOW (ref 32–37)
MCV RBC AUTO: 76.4 FL — LOW (ref 81–99)
MONOCYTES # BLD AUTO: 0.87 K/UL — HIGH (ref 0.1–0.6)
MONOCYTES NFR BLD AUTO: 9.8 % — HIGH (ref 1.7–9.3)
NEUTROPHILS # BLD AUTO: 6.62 K/UL — HIGH (ref 1.4–6.5)
NEUTROPHILS NFR BLD AUTO: 74.6 % — SIGNIFICANT CHANGE UP (ref 42.2–75.2)
NRBC # BLD: 0 /100 WBCS — SIGNIFICANT CHANGE UP (ref 0–0)
PLATELET # BLD AUTO: 389 K/UL — SIGNIFICANT CHANGE UP (ref 130–400)
POTASSIUM SERPL-MCNC: 4.7 MMOL/L — SIGNIFICANT CHANGE UP (ref 3.5–5)
POTASSIUM SERPL-SCNC: 4.7 MMOL/L — SIGNIFICANT CHANGE UP (ref 3.5–5)
PROT SERPL-MCNC: 6.7 G/DL — SIGNIFICANT CHANGE UP (ref 6–8)
RBC # BLD: 5.51 M/UL — HIGH (ref 4.2–5.4)
RBC # FLD: 15.7 % — HIGH (ref 11.5–14.5)
SODIUM SERPL-SCNC: 138 MMOL/L — SIGNIFICANT CHANGE UP (ref 135–146)
SPECIMEN SOURCE: SIGNIFICANT CHANGE UP
TOTAL CHOLESTEROL/HDL RATIO MEASUREMENT: 4.5 RATIO — SIGNIFICANT CHANGE UP (ref 4–5.5)
TRIGL SERPL-MCNC: 214 MG/DL — HIGH (ref 10–149)
TSH SERPL-MCNC: 0.55 UIU/ML — SIGNIFICANT CHANGE UP (ref 0.27–4.2)
WBC # BLD: 8.87 K/UL — SIGNIFICANT CHANGE UP (ref 4.8–10.8)
WBC # FLD AUTO: 8.87 K/UL — SIGNIFICANT CHANGE UP (ref 4.8–10.8)

## 2019-03-28 PROCEDURE — 99223 1ST HOSP IP/OBS HIGH 75: CPT

## 2019-03-28 RX ORDER — INSULIN GLARGINE 100 [IU]/ML
34 INJECTION, SOLUTION SUBCUTANEOUS EVERY MORNING
Qty: 0 | Refills: 0 | Status: DISCONTINUED | OUTPATIENT
Start: 2019-03-28 | End: 2019-04-01

## 2019-03-28 RX ORDER — INSULIN LISPRO 100/ML
4 VIAL (ML) SUBCUTANEOUS
Qty: 0 | Refills: 0 | Status: DISCONTINUED | OUTPATIENT
Start: 2019-03-28 | End: 2019-03-28

## 2019-03-28 RX ORDER — SODIUM CHLORIDE 9 MG/ML
1000 INJECTION, SOLUTION INTRAVENOUS
Qty: 0 | Refills: 0 | Status: DISCONTINUED | OUTPATIENT
Start: 2019-03-28 | End: 2019-04-01

## 2019-03-28 RX ORDER — PANTOPRAZOLE SODIUM 20 MG/1
40 TABLET, DELAYED RELEASE ORAL
Qty: 0 | Refills: 0 | Status: DISCONTINUED | OUTPATIENT
Start: 2019-03-28 | End: 2019-04-01

## 2019-03-28 RX ORDER — AMLODIPINE BESYLATE 2.5 MG/1
5 TABLET ORAL DAILY
Qty: 0 | Refills: 0 | Status: DISCONTINUED | OUTPATIENT
Start: 2019-03-28 | End: 2019-04-01

## 2019-03-28 RX ORDER — DONEPEZIL HYDROCHLORIDE 10 MG/1
5 TABLET, FILM COATED ORAL AT BEDTIME
Qty: 0 | Refills: 0 | Status: DISCONTINUED | OUTPATIENT
Start: 2019-03-28 | End: 2019-04-01

## 2019-03-28 RX ORDER — ENOXAPARIN SODIUM 100 MG/ML
40 INJECTION SUBCUTANEOUS DAILY
Qty: 0 | Refills: 0 | Status: DISCONTINUED | OUTPATIENT
Start: 2019-03-27 | End: 2019-04-01

## 2019-03-28 RX ORDER — DEXTROSE 50 % IN WATER 50 %
12.5 SYRINGE (ML) INTRAVENOUS ONCE
Qty: 0 | Refills: 0 | Status: DISCONTINUED | OUTPATIENT
Start: 2019-03-28 | End: 2019-04-01

## 2019-03-28 RX ORDER — ASPIRIN/CALCIUM CARB/MAGNESIUM 324 MG
0 TABLET ORAL
Qty: 0 | Refills: 0 | COMMUNITY

## 2019-03-28 RX ORDER — DEXTROSE 50 % IN WATER 50 %
25 SYRINGE (ML) INTRAVENOUS ONCE
Qty: 0 | Refills: 0 | Status: DISCONTINUED | OUTPATIENT
Start: 2019-03-28 | End: 2019-04-01

## 2019-03-28 RX ORDER — CLOPIDOGREL BISULFATE 75 MG/1
75 TABLET, FILM COATED ORAL DAILY
Qty: 0 | Refills: 0 | Status: DISCONTINUED | OUTPATIENT
Start: 2019-03-28 | End: 2019-04-01

## 2019-03-28 RX ORDER — INSULIN LISPRO 100/ML
14 VIAL (ML) SUBCUTANEOUS
Qty: 0 | Refills: 0 | Status: DISCONTINUED | OUTPATIENT
Start: 2019-03-28 | End: 2019-04-01

## 2019-03-28 RX ORDER — CHLORHEXIDINE GLUCONATE 213 G/1000ML
1 SOLUTION TOPICAL
Qty: 0 | Refills: 0 | Status: DISCONTINUED | OUTPATIENT
Start: 2019-03-28 | End: 2019-03-31

## 2019-03-28 RX ORDER — GABAPENTIN 400 MG/1
300 CAPSULE ORAL THREE TIMES A DAY
Qty: 0 | Refills: 0 | Status: DISCONTINUED | OUTPATIENT
Start: 2019-03-28 | End: 2019-04-01

## 2019-03-28 RX ORDER — SIMVASTATIN 20 MG/1
40 TABLET, FILM COATED ORAL AT BEDTIME
Qty: 0 | Refills: 0 | Status: DISCONTINUED | OUTPATIENT
Start: 2019-03-28 | End: 2019-04-01

## 2019-03-28 RX ORDER — GLUCAGON INJECTION, SOLUTION 0.5 MG/.1ML
1 INJECTION, SOLUTION SUBCUTANEOUS ONCE
Qty: 0 | Refills: 0 | Status: DISCONTINUED | OUTPATIENT
Start: 2019-03-28 | End: 2019-04-01

## 2019-03-28 RX ORDER — PERPHENAZINE 8 MG/1
4 TABLET, FILM COATED ORAL AT BEDTIME
Qty: 0 | Refills: 0 | Status: DISCONTINUED | OUTPATIENT
Start: 2019-03-28 | End: 2019-04-01

## 2019-03-28 RX ORDER — ASPIRIN/CALCIUM CARB/MAGNESIUM 324 MG
1 TABLET ORAL
Qty: 0 | Refills: 0 | COMMUNITY

## 2019-03-28 RX ORDER — ONDANSETRON 8 MG/1
4 TABLET, FILM COATED ORAL ONCE
Qty: 0 | Refills: 0 | Status: COMPLETED | OUTPATIENT
Start: 2019-03-28 | End: 2019-03-28

## 2019-03-28 RX ORDER — GABAPENTIN 400 MG/1
0 CAPSULE ORAL
Qty: 0 | Refills: 0 | COMMUNITY

## 2019-03-28 RX ORDER — ASPIRIN/CALCIUM CARB/MAGNESIUM 324 MG
81 TABLET ORAL DAILY
Qty: 0 | Refills: 0 | Status: DISCONTINUED | OUTPATIENT
Start: 2019-03-28 | End: 2019-04-01

## 2019-03-28 RX ORDER — LEVOTHYROXINE SODIUM 125 MCG
112 TABLET ORAL DAILY
Qty: 0 | Refills: 0 | Status: DISCONTINUED | OUTPATIENT
Start: 2019-03-28 | End: 2019-04-01

## 2019-03-28 RX ORDER — DEXTROSE 50 % IN WATER 50 %
15 SYRINGE (ML) INTRAVENOUS ONCE
Qty: 0 | Refills: 0 | Status: DISCONTINUED | OUTPATIENT
Start: 2019-03-28 | End: 2019-04-01

## 2019-03-28 RX ORDER — INFLUENZA VIRUS VACCINE 15; 15; 15; 15 UG/.5ML; UG/.5ML; UG/.5ML; UG/.5ML
0.5 SUSPENSION INTRAMUSCULAR ONCE
Qty: 0 | Refills: 0 | Status: DISCONTINUED | OUTPATIENT
Start: 2019-03-28 | End: 2019-04-01

## 2019-03-28 RX ORDER — INSULIN LISPRO 100/ML
VIAL (ML) SUBCUTANEOUS
Qty: 0 | Refills: 0 | Status: DISCONTINUED | OUTPATIENT
Start: 2019-03-28 | End: 2019-04-01

## 2019-03-28 RX ORDER — METOPROLOL TARTRATE 50 MG
1 TABLET ORAL
Qty: 0 | Refills: 0 | COMMUNITY

## 2019-03-28 RX ORDER — QUETIAPINE FUMARATE 200 MG/1
100 TABLET, FILM COATED ORAL DAILY
Qty: 0 | Refills: 0 | Status: DISCONTINUED | OUTPATIENT
Start: 2019-03-28 | End: 2019-03-30

## 2019-03-28 RX ADMIN — DONEPEZIL HYDROCHLORIDE 5 MILLIGRAM(S): 10 TABLET, FILM COATED ORAL at 21:10

## 2019-03-28 RX ADMIN — Medication 112 MICROGRAM(S): at 05:18

## 2019-03-28 RX ADMIN — Medication 1: at 11:28

## 2019-03-28 RX ADMIN — Medication 81 MILLIGRAM(S): at 11:27

## 2019-03-28 RX ADMIN — SIMVASTATIN 40 MILLIGRAM(S): 20 TABLET, FILM COATED ORAL at 21:10

## 2019-03-28 RX ADMIN — CLOPIDOGREL BISULFATE 75 MILLIGRAM(S): 75 TABLET, FILM COATED ORAL at 11:27

## 2019-03-28 RX ADMIN — PERPHENAZINE 4 MILLIGRAM(S): 8 TABLET, FILM COATED ORAL at 21:10

## 2019-03-28 RX ADMIN — Medication 4 UNIT(S): at 07:56

## 2019-03-28 RX ADMIN — SODIUM CHLORIDE 75 MILLILITER(S): 9 INJECTION, SOLUTION INTRAVENOUS at 04:53

## 2019-03-28 RX ADMIN — Medication 40 MILLIGRAM(S): at 11:26

## 2019-03-28 RX ADMIN — Medication 0.5 MILLIGRAM(S): at 17:01

## 2019-03-28 RX ADMIN — CHLORHEXIDINE GLUCONATE 1 APPLICATION(S): 213 SOLUTION TOPICAL at 05:18

## 2019-03-28 RX ADMIN — Medication 4 UNIT(S): at 11:28

## 2019-03-28 RX ADMIN — PANTOPRAZOLE SODIUM 40 MILLIGRAM(S): 20 TABLET, DELAYED RELEASE ORAL at 05:18

## 2019-03-28 RX ADMIN — Medication 3: at 07:57

## 2019-03-28 RX ADMIN — SODIUM CHLORIDE 75 MILLILITER(S): 9 INJECTION, SOLUTION INTRAVENOUS at 21:32

## 2019-03-28 RX ADMIN — INSULIN GLARGINE 34 UNIT(S): 100 INJECTION, SOLUTION SUBCUTANEOUS at 07:55

## 2019-03-28 RX ADMIN — ENOXAPARIN SODIUM 40 MILLIGRAM(S): 100 INJECTION SUBCUTANEOUS at 11:27

## 2019-03-28 RX ADMIN — GABAPENTIN 300 MILLIGRAM(S): 400 CAPSULE ORAL at 21:10

## 2019-03-28 RX ADMIN — AMLODIPINE BESYLATE 5 MILLIGRAM(S): 2.5 TABLET ORAL at 05:18

## 2019-03-28 NOTE — CONSULT NOTE ADULT - SUBJECTIVE AND OBJECTIVE BOX
SOLEDAD KELLYR [id number]  83y Female    HPI:  82 y/o female with h/o HTN, HLD, anxiety/depression, CVA, AAA, hypothyroid, in ER with c/o abd pain.  Started this morning, diffusely over abdomen, waxing and waning, lasting a few minutes at a time, sharp.  +N. no V/D.  Having BM's today.  No dysuria.  no back pain.  no CP/SOB.  no ha/dizziness/loc. (27 Mar 2019 23:42).       PAST MEDICAL & SURGICAL HISTORY:  Vertigo  Hypothyroid  Brain aneurysm  AAA (abdominal aortic aneurysm)  HLD (hyperlipidemia)  HTN (hypertension)  Depression  Anxiety  Panic disorder  DM (diabetes mellitus)  CVA (cerebral vascular accident)  H/O: hysterectomy  Status post cataract surgery        MEDICATIONS  (STANDING):  enoxaparin Injectable 40 milliGRAM(s) SubCutaneous daily  lactated ringers. 1000 milliLiter(s) (125 mL/Hr) IV Continuous <Continuous>    MEDICATIONS  (PRN):      Allergies    codeine (Nausea)  IV Contrast (Anaphylaxis)  penicillin (Hives)  shellfish (Unknown)    Intolerances        REVIEW OF SYSTEMS    [x ] A ten-point review of systems was otherwise negative except as noted.  [ ] Due to altered mental status/intubation, subjective information were not able to be obtained from the patient. History was obtained, to the extent possible, from review of the chart and collateral sources of information.      Vital Signs Last 24 Hrs  T(C): 36.6 (27 Mar 2019 11:33), Max: 36.6 (27 Mar 2019 11:33)  T(F): 97.8 (27 Mar 2019 11:33), Max: 97.8 (27 Mar 2019 11:33)  HR: 55 (27 Mar 2019 12:39) (52 - 55)  BP: 171/71 (27 Mar 2019 12:39) (127/67 - 171/71)  BP(mean): --  RR: 18 (27 Mar 2019 12:39) (18 - 18)  SpO2: 100% (27 Mar 2019 11:33) (100% - 100%)    PHYSICAL EXAM:  GENERAL: NAD, well-appearing  CHEST/LUNG: Clear to auscultation bilaterally  HEART: Regular rate and rhythm  ABDOMEN: Soft, Nontender, Nondistended;   EXTREMITIES:  No clubbing, cyanosis, or edema      LABS:  Labs:  CAPILLARY BLOOD GLUCOSE      POCT Blood Glucose.: 193 mg/dL (27 Mar 2019 22:03)  POCT Blood Glucose.: 175 mg/dL (27 Mar 2019 12:23)                          13.4   15.52 )-----------( 370      ( 27 Mar 2019 14:45 )             42.6       Auto Neutrophil %: 82.6 % (19 @ 14:45)  Auto Immature Granulocyte %: 0.7 % (19 @ 14:45)        138  |  96<L>  |  17  ----------------------------<  178<H>  4.6   |  27  |  0.9      Calcium, Total Serum: 10.3 mg/dL (19 @ 14:45)      LFTs:             7.4  | 0.5  | 12       ------------------[124     ( 27 Mar 2019 14:45 )  4.5  | <0.2 | 7           Lipase:29     Amylase:x         Blood Gas Venous - Lactate: 1.5 mmoL/L (19 @ 15:14)  Lactate, Blood: 1.6 mmol/L (19 @ 14:45)      Coags:     12.50  ----< 1.09    ( 27 Mar 2019 14:45 )     35.4        CARDIAC MARKERS ( 27 Mar 2019 14:45 )  x     / <0.01 ng/mL / x     / x     / x              Urinalysis Basic - ( 27 Mar 2019 14:00 )    Color: Yellow / Appearance: Cloudy / S.025 / pH: x  Gluc: x / Ketone: Negative  / Bili: Small / Urobili: 1.0 mg/dL   Blood: x / Protein: Trace mg/dL / Nitrite: Negative   Leuk Esterase: Small / RBC: 1-2 /HPF / WBC 3-5 /HPF   Sq Epi: x / Non Sq Epi: Few /HPF / Bacteria: x            RADIOLOGY & ADDITIONAL STUDIES:  < from: CT Abdomen and Pelvis w/ Oral Cont (19 @ 17:53) >  IMPRESSION:     1. Small bowel dilatation primarily at level of the jejunum with   mesenteric congestion. Differential considerations favor mechanical small   bowel obstruction; no transition point     2. Right middle lobe nodule measuring 0.6 cm, according to Fleischner's   criteria follow-up CT is recommended in 6-12 months and consider further   follow-up at 18-24 months.    3. Stable lower abdominal aortic aneurysm just above the level of the   bifurcation measuring 4 cm.      < end of copied text >

## 2019-03-28 NOTE — DISCHARGE NOTE NURSING/CASE MANAGEMENT/SOCIAL WORK - NSDCDPATPORTLINK_GEN_ALL_CORE
You can access the [facility] Patient Portal, offered by [facility], by registering with the following website: [email or web address]

## 2019-03-28 NOTE — PROGRESS NOTE ADULT - ASSESSMENT
82 y/o female with h/o HTN, HLD, anxiety/depression, CVA, AAA, hypothyroid, in ER with c/o abd pain.  Started this morning, diffusely over abdomen, waxing and waning, lasting a few minutes at a time, sharp. +N. no V/D.  Having BM's today.  No dysuria.  no back pain.  no CP/SOB.  no ha/dizziness/loc.       Abdominal pain with nausea likely 2/2 small bowel obstruction   - CT abdomen/pelvic: Small bowel dilatation primarily at level of the jejunum with mesenteric congestion. Differential considerations favor mechanical small   bowel obstruction; no transition point. Right middle lobe nodule measuring 0.6 cm, according to Fleischner's criteria follow-up CT is recommended in 6-12 months and consider further   follow-up at 18-24 months. Stable lower abdominal aortic aneurysm just above the level of the bifurcation measuring 4 cm.  - start on IVF LR @ 75cc/hr  - NPO for now  - Zofran prn for nausea  - no need for NGT as patient is not vomiting currently.   - f/u Surgery     Pulmonary nodule   - CT abdo/pelvic: Right middle lobe nodule measuring 0.6 cm.   - f/u outpatient.     HTN  - c/w     HLD  - c/w statin    DM   - c/w insulin regimen (monitor FS)    Hypothyroidism  - c/w synthroid     Anxiety/Depression  - c/w     AAA  - CT abdo/pelvic: Stable lower abdominal aortic aneurysm just above the level of the bifurcation measuring 4 cm.  - follow up outpatient.       DVT ppx: Lovenox SC  GI ppx: not indicated.   Activity: OOBC  Diet: NPO for now

## 2019-03-28 NOTE — PROGRESS NOTE ADULT - ASSESSMENT
84 yo F with hx of ileus from gastroparesis HTN, HLD, Anxiety/Depression, CVA (5 yrs ago), AAA and Hypothyroidism presents to ED for 2 days hx of abdominal pain. In ED, patient was found to have SBO obstruction on CT abdomen/pelvic. Surgery was consulted and recommended conservative management for now.      Abdominal pain with nausea likely 2/2 small bowel obstruction   - CT abdomen/pelvis: Small bowel dilatation primarily at level of the jejunum with mesenteric congestion. Differential considerations favor mechanical small bowel obstruction; no transition point.  - c/w  IVF LR @ 75cc/hr  - NPO for now, Zofran prn for nausea  -NGT placed on the floor for vomiting   -Per Surgery: No interventions for now  -F/up Obstructive series Abdominal X-ray     Mild ALEXANDER on CKD 2 : likely  prerenal 2/2 reduced PO intake and vomiting  -c/w IVF for now  -Daily BMP's      UTI: asymptomatic   -Positive UA, F/up Ucx  -No antibiotics for now    Incidental Pulmonary nodule   - CT A/P:  Right middle lobe nodule measuring 0.6 cm.   - f/u outpatient.     Abdominal Aortic Aneurysm   - CT A/P : Stable lower abdominal aortic aneurysm just above the level of the bifurcation measuring 4 cm.  - follow up outpatient.     Essential HTN: Not at Goal   - c/w Amlodipine     HLD  - c/w statin    DM II: Uncontrolled   -FS before meals and at bedtime  - c/w insulin regimen    Hypothyroidism : Stable   - c/w synthroid     -CVA: 5yrs ago with Mild residual speech slurring  -c/w Plavix, statins, ASA    Anxiety/Depression  - c/w Paroxetine     DVT Ppx: Lovenox subQ    GI PPx: not Indicated    Full code    Dispo: from Home  -Pending ability to Tolerate PO 82 yo F with hx of ileus from gastroparesis HTN, HLD, Anxiety/Depression, CVA (5 yrs ago), AAA and Hypothyroidism presents to ED for 2 days hx of abdominal pain. In ED, patient was found to have SBO obstruction on CT abdomen/pelvic. Surgery was consulted and recommended conservative management for now.      Abdominal pain with nausea likely 2/2 small bowel obstruction   - CT abdomen/pelvis: Small bowel dilatation primarily at level of the jejunum with mesenteric congestion. Differential considerations favor mechanical small bowel obstruction; no transition point.  - c/w  IVF LR @ 75cc/hr  - NPO for now, Zofran prn for nausea  -NGT placed on the floor for vomiting   -Per Surgery: No interventions for now  -F/up Obstructive series Abdominal X-ray     Mild ALEXANDER on CKD 2 : likely  prerenal 2/2 reduced PO intake and vomiting  -c/w IVF for now  -Daily BMP's      UTI: asymptomatic   -Positive UA, F/up Ucx  -No antibiotics for now    Incidental Pulmonary nodule   - CT A/P:  Right middle lobe nodule measuring 0.6 cm.   - f/u outpatient.     Abdominal Aortic Aneurysm   - CT A/P : Stable lower abdominal aortic aneurysm just above the level of the bifurcation measuring 4 cm.  - follow up outpatient.     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 In ED, patient was found to have SBO obstruction on CT abdomen/pelvic. Surgery was consulted and recommended conservative management for now.      Abdominal pain with nausea likely 2/2 small bowel obstruction   - CT abdomen/pelvis: Small bowel dilatation primarily at level of the jejunum with mesenteric congestion. Differential considerations favor mechanical small bowel obstruction; no transition point.  - c/w  IVF LR @ 75cc/hr  - NPO for now, Zofran prn for nausea  -NGT placed on the floor for vomiting   -Per Surgery: No interventions for now  -F/up Obstructive series Abdominal X-ray     Mild ALEXANDER on CKD 2 : likely  prerenal 2/2 reduced PO intake and vomiting  -c/w IVF for now  -Daily BMP's      UTI: asymptomatic   -Positive UA, F/up Ucx  -No antibiotics for now    Incidental Pulmonary nodule   - CT A/P:  Right middle lobe nodule measuring 0.6 cm.   - f/u outpatient.     Abdominal Aortic Aneurysm   - CT A/P : Stable lower abdominal aortic aneurysm just above the level of the bifurcation measuring 4 cm.  - follow up outpatient.     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: Uncontrolled   -FS before meals and at bedtime  - c/w insulin regimen    Hypothyroidism : Stable   - c/w synthroid     -CVA: 5yrs ago with Mild residual speech slurring  -c/w Plavix, statins, ASA    Anxiety/Depression  - c/w Paroxetine     DVT Ppx: Lovenox subQ    GI PPx: not Indicated    Full code    Dispo: from Home  -Pending ability to Tolerate PO     MEDS Confirmed by pharmacy  -lorazepam 0.5mg 2x/daily, an extra tab as needed  Seroquel 100mg daily  -paroxetine 40mg daily  -Lantus 34/ lispro 14/14/14  -donepezil 5mg daily   -perphenazine 4mg daily  -levothyroxine 112mcg daily  -gabapentin 300mg 3x/day

## 2019-03-28 NOTE — CONSULT NOTE ADULT - ATTENDING COMMENTS
admitted for psbo  ng tube initially high output.   now non bilious  abdomen soft non tender  states has passed flatus since admission  awaiting bowel movement    conintue npo    iv fluid

## 2019-03-29 LAB
ALBUMIN SERPL ELPH-MCNC: 3.6 G/DL — SIGNIFICANT CHANGE UP (ref 3.5–5.2)
ALP SERPL-CCNC: 98 U/L — SIGNIFICANT CHANGE UP (ref 30–115)
ALT FLD-CCNC: <5 U/L — SIGNIFICANT CHANGE UP (ref 0–41)
ANION GAP SERPL CALC-SCNC: 13 MMOL/L — SIGNIFICANT CHANGE UP (ref 7–14)
AST SERPL-CCNC: 11 U/L — SIGNIFICANT CHANGE UP (ref 0–41)
BASOPHILS # BLD AUTO: 0.05 K/UL — SIGNIFICANT CHANGE UP (ref 0–0.2)
BASOPHILS NFR BLD AUTO: 0.8 % — SIGNIFICANT CHANGE UP (ref 0–1)
BILIRUB SERPL-MCNC: 0.6 MG/DL — SIGNIFICANT CHANGE UP (ref 0.2–1.2)
BUN SERPL-MCNC: 22 MG/DL — HIGH (ref 10–20)
CALCIUM SERPL-MCNC: 8.8 MG/DL — SIGNIFICANT CHANGE UP (ref 8.5–10.1)
CHLORIDE SERPL-SCNC: 98 MMOL/L — SIGNIFICANT CHANGE UP (ref 98–110)
CO2 SERPL-SCNC: 27 MMOL/L — SIGNIFICANT CHANGE UP (ref 17–32)
CREAT SERPL-MCNC: 1.1 MG/DL — SIGNIFICANT CHANGE UP (ref 0.7–1.5)
EOSINOPHIL # BLD AUTO: 0.36 K/UL — SIGNIFICANT CHANGE UP (ref 0–0.7)
EOSINOPHIL NFR BLD AUTO: 5.5 % — SIGNIFICANT CHANGE UP (ref 0–8)
GLUCOSE BLDC GLUCOMTR-MCNC: 149 MG/DL — HIGH (ref 70–99)
GLUCOSE BLDC GLUCOMTR-MCNC: 166 MG/DL — HIGH (ref 70–99)
GLUCOSE BLDC GLUCOMTR-MCNC: 179 MG/DL — HIGH (ref 70–99)
GLUCOSE SERPL-MCNC: 170 MG/DL — HIGH (ref 70–99)
HCT VFR BLD CALC: 38 % — SIGNIFICANT CHANGE UP (ref 37–47)
HGB BLD-MCNC: 11.7 G/DL — LOW (ref 12–16)
IMM GRANULOCYTES NFR BLD AUTO: 0.3 % — SIGNIFICANT CHANGE UP (ref 0.1–0.3)
INR BLD: 1.25 RATIO — SIGNIFICANT CHANGE UP (ref 0.65–1.3)
LYMPHOCYTES # BLD AUTO: 1.03 K/UL — LOW (ref 1.2–3.4)
LYMPHOCYTES # BLD AUTO: 15.8 % — LOW (ref 20.5–51.1)
MAGNESIUM SERPL-MCNC: 1.9 MG/DL — SIGNIFICANT CHANGE UP (ref 1.8–2.4)
MCHC RBC-ENTMCNC: 24 PG — LOW (ref 27–31)
MCHC RBC-ENTMCNC: 30.8 G/DL — LOW (ref 32–37)
MCV RBC AUTO: 77.9 FL — LOW (ref 81–99)
MONOCYTES # BLD AUTO: 0.53 K/UL — SIGNIFICANT CHANGE UP (ref 0.1–0.6)
MONOCYTES NFR BLD AUTO: 8.1 % — SIGNIFICANT CHANGE UP (ref 1.7–9.3)
NEUTROPHILS # BLD AUTO: 4.52 K/UL — SIGNIFICANT CHANGE UP (ref 1.4–6.5)
NEUTROPHILS NFR BLD AUTO: 69.5 % — SIGNIFICANT CHANGE UP (ref 42.2–75.2)
NRBC # BLD: 0 /100 WBCS — SIGNIFICANT CHANGE UP (ref 0–0)
PLATELET # BLD AUTO: 262 K/UL — SIGNIFICANT CHANGE UP (ref 130–400)
POTASSIUM SERPL-MCNC: 3.9 MMOL/L — SIGNIFICANT CHANGE UP (ref 3.5–5)
POTASSIUM SERPL-SCNC: 3.9 MMOL/L — SIGNIFICANT CHANGE UP (ref 3.5–5)
PROT SERPL-MCNC: 6.3 G/DL — SIGNIFICANT CHANGE UP (ref 6–8)
PROTHROM AB SERPL-ACNC: 14.4 SEC — HIGH (ref 9.95–12.87)
RBC # BLD: 4.88 M/UL — SIGNIFICANT CHANGE UP (ref 4.2–5.4)
RBC # FLD: 15.9 % — HIGH (ref 11.5–14.5)
SODIUM SERPL-SCNC: 138 MMOL/L — SIGNIFICANT CHANGE UP (ref 135–146)
WBC # BLD: 6.51 K/UL — SIGNIFICANT CHANGE UP (ref 4.8–10.8)
WBC # FLD AUTO: 6.51 K/UL — SIGNIFICANT CHANGE UP (ref 4.8–10.8)

## 2019-03-29 PROCEDURE — 99233 SBSQ HOSP IP/OBS HIGH 50: CPT

## 2019-03-29 RX ADMIN — PERPHENAZINE 4 MILLIGRAM(S): 8 TABLET, FILM COATED ORAL at 21:16

## 2019-03-29 RX ADMIN — GABAPENTIN 300 MILLIGRAM(S): 400 CAPSULE ORAL at 13:17

## 2019-03-29 RX ADMIN — Medication 81 MILLIGRAM(S): at 12:14

## 2019-03-29 RX ADMIN — GABAPENTIN 300 MILLIGRAM(S): 400 CAPSULE ORAL at 21:14

## 2019-03-29 RX ADMIN — SODIUM CHLORIDE 75 MILLILITER(S): 9 INJECTION, SOLUTION INTRAVENOUS at 07:00

## 2019-03-29 RX ADMIN — QUETIAPINE FUMARATE 100 MILLIGRAM(S): 200 TABLET, FILM COATED ORAL at 12:15

## 2019-03-29 RX ADMIN — Medication 14 UNIT(S): at 17:07

## 2019-03-29 RX ADMIN — CLOPIDOGREL BISULFATE 75 MILLIGRAM(S): 75 TABLET, FILM COATED ORAL at 12:14

## 2019-03-29 RX ADMIN — AMLODIPINE BESYLATE 5 MILLIGRAM(S): 2.5 TABLET ORAL at 06:02

## 2019-03-29 RX ADMIN — ENOXAPARIN SODIUM 40 MILLIGRAM(S): 100 INJECTION SUBCUTANEOUS at 12:13

## 2019-03-29 RX ADMIN — DONEPEZIL HYDROCHLORIDE 5 MILLIGRAM(S): 10 TABLET, FILM COATED ORAL at 21:14

## 2019-03-29 RX ADMIN — CHLORHEXIDINE GLUCONATE 1 APPLICATION(S): 213 SOLUTION TOPICAL at 06:02

## 2019-03-29 RX ADMIN — PANTOPRAZOLE SODIUM 40 MILLIGRAM(S): 20 TABLET, DELAYED RELEASE ORAL at 06:02

## 2019-03-29 RX ADMIN — GABAPENTIN 300 MILLIGRAM(S): 400 CAPSULE ORAL at 06:02

## 2019-03-29 RX ADMIN — Medication 0.5 MILLIGRAM(S): at 06:05

## 2019-03-29 RX ADMIN — Medication 40 MILLIGRAM(S): at 12:14

## 2019-03-29 RX ADMIN — SODIUM CHLORIDE 75 MILLILITER(S): 9 INJECTION, SOLUTION INTRAVENOUS at 21:15

## 2019-03-29 RX ADMIN — Medication 112 MICROGRAM(S): at 06:02

## 2019-03-29 RX ADMIN — SIMVASTATIN 40 MILLIGRAM(S): 20 TABLET, FILM COATED ORAL at 21:14

## 2019-03-29 RX ADMIN — SODIUM CHLORIDE 75 MILLILITER(S): 9 INJECTION, SOLUTION INTRAVENOUS at 21:58

## 2019-03-29 NOTE — PROGRESS NOTE ADULT - ASSESSMENT
84 yo F with hx of ileus from gastroparesis HTN, HLD, Anxiety/Depression, CVA (5 yrs ago), AAA and Hypothyroidism presents to ED for 2 days hx of abdominal pain. In ED, patient was found to have SBO obstruction on CT abdomen/pelvic. Surgery was consulted and recommended conservative management for now.      Abdominal pain with nausea likely 2/2 small bowel obstruction   - CT abdomen/pelvis: Small bowel dilatation primarily at level of the jejunum with mesenteric congestion. Differential considerations favor mechanical small bowel obstruction; no transition point.  - c/w  IVF LR @ 75cc/hr: keep for today 3/29/13  - Diet Advanced today, Zofran prn for nausea  -NGT placed on the floor for vomiting   -Per Surgery: No interventions for now: okay to advance diet   -Obstructive series Abdominal X-ray    Mild ALEXANDER on CKD 2 : likely  prerenal 2/2 reduced PO intake and vomiting: improving   -c/w IVF for now, keep for today, will d/c if pt tolerated diet  -Daily BMP's      UTI: asymptomatic   -Positive UA, F/up Ucx  -No antibiotics for now    Incidental Pulmonary nodule   - CT A/P:  Right middle lobe nodule measuring 0.6 cm.   - f/u outpatient.     Abdominal Aortic Aneurysm   - CT A/P : Stable lower abdominal aortic aneurysm just above the level of the bifurcation measuring 4 cm.  - follow up outpatient.     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

## 2019-03-29 NOTE — PROGRESS NOTE ADULT - ASSESSMENT
84 yo F with hx of ileus from gastroparesis HTN, HLD, Anxiety/Depression, CVA (5 yrs ago), AAA and Hypothyroidism presents to ED for 2 days hx of abdominal pain. In ED, patient was found to have SBO obstruction on CT abdomen/pelvic. Surgery was consulted and recommended conservative management for now.      Abdominal pain with nausea likely 2/2 small bowel obstruction   - CT abdomen/pelvis: Small bowel dilatation primarily at level of the jejunum with mesenteric congestion. Differential considerations favor mechanical small bowel obstruction; no transition point.  - c/w  IVF LR @ 75cc/hr  - NPO for now, Zofran prn for nausea  -NGT placed on the floor for vomiting   -Per Surgery: No interventions for now  -F/up Obstructive series Abdominal X-ray     Mild ALEXANDER on CKD 2 : likely  prerenal 2/2 reduced PO intake and vomiting  -c/w IVF for now  -Daily BMP's      UTI: asymptomatic   -Positive UA, F/up Ucx  -No antibiotics for now    Incidental Pulmonary nodule   - CT A/P:  Right middle lobe nodule measuring 0.6 cm.   - f/u outpatient.     Abdominal Aortic Aneurysm   - CT A/P : Stable lower abdominal aortic aneurysm just above the level of the bifurcation measuring 4 cm.  - follow up outpatient.     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: Uncontrolled   -FS before meals and at bedtime  - c/w insulin regimen    Hypothyroidism : Stable   - c/w synthroid     -CVA: 5yrs ago with Mild residual speech slurring  -c/w Plavix, statins, ASA    Anxiety/Depression  - c/w Paroxetine     DVT Ppx: Lovenox subQ    GI PPx: not Indicated    Full code    Dispo: from Home  -Pending ability to Tolerate PO     MEDS Confirmed by pharmacy  -lorazepam 0.5mg 2x/daily, an extra tab as needed  Seroquel 100mg daily  -paroxetine 40mg daily  -Lantus 34/ lispro 14/14/14  -donepezil 5mg daily   -perphenazine 4mg daily  -levothyroxine 112mcg daily  -gabapentin 300mg 3x/day

## 2019-03-29 NOTE — PROGRESS NOTE ADULT - SUBJECTIVE AND OBJECTIVE BOX
Progress Note: Surgery  Patient: CHITRA KELLY , 83y (1935)Female   MRN: [id number]  Location: [address]  Visit: 03-27-19 Inpatient  Date: 03-29-19 @ 01:55    Procedure/Injury: partial SBO    Events over 24h: patient reports passing small amount of gas, still no stool, otherwise patient is doing well, obstructive series done with no new dilation of small bowel seen     Vitals: T(F): 99.4 (03-28-19 @ 20:55), Max: 99.4 (03-28-19 @ 20:55)  HR: 99 (03-28-19 @ 20:55)  BP: 123/55 (03-28-19 @ 20:55) (119/78 - 159/74)  RR: 18 (03-28-19 @ 20:55)  SpO2: 98% (03-28-19 @ 19:29)    Diet: Diet, NPO:   Except Medications  With Ice Chips/Sips of Water (03-28-19 @ 00:00)      Bowel function:   Bowel movement [no]   Flatus [yes]    Out:   03-27-19 @ 07:01  -  03-28-19 @ 07:00  --------------------------------------------------------  OUT:    Nasoenteral Tube: 500 mL  Total OUT: 500 mL      03-28-19 @ 07:01  -  03-29-19 @ 01:55  --------------------------------------------------------  OUT:    Nasoenteral Tube: 350 mL  Total OUT: 350 mL    PHYSICAL EXAM:  GENERAL: NAD, well-appearing, NGT in place   CHEST/LUNG: Clear to auscultation bilaterally  HEART: Regular rate and rhythm  ABDOMEN: Soft, Nontender, Nondistended;   EXTREMITIES:  No clubbing, cyanosis, or edema    Medications: [Standing]  amLODIPine   Tablet 5 milliGRAM(s) Oral daily  aspirin enteric coated 81 milliGRAM(s) Oral daily  chlorhexidine 4% Liquid 1 Application(s) Topical <User Schedule>  clopidogrel Tablet 75 milliGRAM(s) Oral daily  dextrose 5%. 1000 milliLiter(s) (50 mL/Hr) IV Continuous <Continuous>  dextrose 50% Injectable 12.5 Gram(s) IV Push once  dextrose 50% Injectable 25 Gram(s) IV Push once  dextrose 50% Injectable 25 Gram(s) IV Push once  donepezil 5 milliGRAM(s) Oral at bedtime  enoxaparin Injectable 40 milliGRAM(s) SubCutaneous daily  gabapentin 300 milliGRAM(s) Oral three times a day  influenza   Vaccine 0.5 milliLiter(s) IntraMuscular once  insulin glargine Injectable (LANTUS) 34 Unit(s) SubCutaneous every morning  insulin lispro (HumaLOG) corrective regimen sliding scale   SubCutaneous three times a day before meals  insulin lispro Injectable (HumaLOG) 14 Unit(s) SubCutaneous three times a day before meals  lactated ringers. 1000 milliLiter(s) (75 mL/Hr) IV Continuous <Continuous>  levothyroxine 112 MICROGram(s) Oral daily  LORazepam     Tablet 0.5 milliGRAM(s) Oral two times a day  pantoprazole    Tablet 40 milliGRAM(s) Oral before breakfast  PARoxetine 40 milliGRAM(s) Oral daily  perphenazine 4 milliGRAM(s) Oral at bedtime  QUEtiapine 100 milliGRAM(s) Oral daily  simvastatin 40 milliGRAM(s) Oral at bedtime    Medications:[PRN]  dextrose 40% Gel 15 Gram(s) Oral once PRN  glucagon  Injectable 1 milliGRAM(s) IntraMuscular once PRN    Labs:                        13.2   8.87  )-----------( 389      ( 28 Mar 2019 07:31 )             42.1     03-28    138  |  96<L>  |  22<H>  ----------------------------<  290<H>  4.7   |  25  |  1.2    Ca    9.3      28 Mar 2019 07:31  Mg     1.8     03-28    TPro  6.7  /  Alb  4.0  /  TBili  0.6  /  DBili  x   /  AST  11  /  ALT  6   /  AlkPhos  105  03-28    LIVER FUNCTIONS - ( 28 Mar 2019 07:31 )  Alb: 4.0 g/dL / Pro: 6.7 g/dL / ALK PHOS: 105 U/L / ALT: 6 U/L / AST: 11 U/L / GGT: x           PT/INR - ( 27 Mar 2019 14:45 )   PT: 12.50 sec;   INR: 1.09 ratio    PTT - ( 27 Mar 2019 14:45 )  PTT:35.4 sec    CARDIAC MARKERS ( 27 Mar 2019 14:45 )  x     / <0.01 ng/mL / x     / x     / x        Date/Time: 03-29-19 @ 01:55

## 2019-03-29 NOTE — PROGRESS NOTE ADULT - ASSESSMENT
Assessment: 83F with partial SBO    Plan:  - Continue NPO for now  - monitor labs, may need fluid bolus given increasing BUN/Cr  - IVF  - continue NGT

## 2019-03-29 NOTE — PROGRESS NOTE ADULT - SUBJECTIVE AND OBJECTIVE BOX
Patient is a 83y old  Female who presents with a chief complaint of SBO (29 Mar 2019 01:55)      Interval events: None    Today is hospital day 2  Patient is  resting comfortably in bed  Denies Abdominal pain, had a BM yesterday  + Bowel sounds but faint, Passing flatus  Plan: Advance diet and monitor  -No surgical intervention for now    PAST MEDICAL & SURGICAL HISTORY:  Vertigo  Hypothyroid  Brain aneurysm  AAA (abdominal aortic aneurysm)  HLD (hyperlipidemia)  HTN (hypertension)  Depression  Anxiety  Panic disorder  DM (diabetes mellitus)  CVA (cerebral vascular accident)  H/O: hysterectomy  Status post cataract surgery      MEDICATIONS  (STANDING):  amLODIPine   Tablet 5 milliGRAM(s) Oral daily  aspirin enteric coated 81 milliGRAM(s) Oral daily  chlorhexidine 4% Liquid 1 Application(s) Topical <User Schedule>  clopidogrel Tablet 75 milliGRAM(s) Oral daily  dextrose 5%. 1000 milliLiter(s) (50 mL/Hr) IV Continuous <Continuous>  dextrose 50% Injectable 12.5 Gram(s) IV Push once  dextrose 50% Injectable 25 Gram(s) IV Push once  dextrose 50% Injectable 25 Gram(s) IV Push once  donepezil 5 milliGRAM(s) Oral at bedtime  enoxaparin Injectable 40 milliGRAM(s) SubCutaneous daily  gabapentin 300 milliGRAM(s) Oral three times a day  influenza   Vaccine 0.5 milliLiter(s) IntraMuscular once  insulin glargine Injectable (LANTUS) 34 Unit(s) SubCutaneous every morning  insulin lispro (HumaLOG) corrective regimen sliding scale   SubCutaneous three times a day before meals  insulin lispro Injectable (HumaLOG) 14 Unit(s) SubCutaneous three times a day before meals  lactated ringers. 1000 milliLiter(s) (75 mL/Hr) IV Continuous <Continuous>  levothyroxine 112 MICROGram(s) Oral daily  LORazepam     Tablet 0.5 milliGRAM(s) Oral two times a day  pantoprazole    Tablet 40 milliGRAM(s) Oral before breakfast  PARoxetine 40 milliGRAM(s) Oral daily  perphenazine 4 milliGRAM(s) Oral at bedtime  QUEtiapine 100 milliGRAM(s) Oral daily  simvastatin 40 milliGRAM(s) Oral at bedtime    MEDICATIONS  (PRN):  dextrose 40% Gel 15 Gram(s) Oral once PRN Blood Glucose LESS THAN 70 milliGRAM(s)/deciliter  glucagon  Injectable 1 milliGRAM(s) IntraMuscular once PRN Glucose LESS THAN 70 milligrams/deciliter          Vital Signs Last 24 Hrs  T(C): 37.3 (29 Mar 2019 12:30), Max: 37.4 (28 Mar 2019 20:55)  T(F): 99.1 (29 Mar 2019 12:30), Max: 99.4 (28 Mar 2019 20:55)  HR: 102 (29 Mar 2019 12:30) (89 - 102)  BP: 100/55 (29 Mar 2019 12:30) (100/55 - 166/72)  BP(mean): --  RR: 18 (29 Mar 2019 12:30) (18 - 18)  SpO2: 98% (28 Mar 2019 19:29) (98% - 98%)  CAPILLARY BLOOD GLUCOSE      POCT Blood Glucose.: 149 mg/dL (29 Mar 2019 12:00)  POCT Blood Glucose.: 179 mg/dL (29 Mar 2019 07:44)  POCT Blood Glucose.: 154 mg/dL (28 Mar 2019 22:02)  POCT Blood Glucose.: 153 mg/dL (28 Mar 2019 16:49)    I&O's Summary    28 Mar 2019 07:01  -  29 Mar 2019 07:00  --------------------------------------------------------  IN: 900 mL / OUT: 350 mL / NET: 550 mL        Physical Exam:    -     General : NAD, laying comfortably in bed, NGT in place draining clear/mucous  liquid     -      Cardiac: S1/S2 appreciated, RRR    -      Pulm: CTA b/l, no adventitious sound    -      GI: Faint BS, ND, NT, Soft, denies flatus    -      Musculoskeletal: Atraumatic, no edema, no skin color changes    -      Neuro: AO x 3, non focal       Labs:                        11.7   6.51  )-----------( 262      ( 29 Mar 2019 07:05 )             38.0             03-29    138  |  98  |  22<H>  ----------------------------<  170<H>  3.9   |  27  |  1.1    Ca    8.8      29 Mar 2019 07:05  Mg     1.9     03-29    TPro  6.3  /  Alb  3.6  /  TBili  0.6  /  DBili  x   /  AST  11  /  ALT  <5  /  AlkPhos  98  03-29    LIVER FUNCTIONS - ( 29 Mar 2019 07:05 )  Alb: 3.6 g/dL / Pro: 6.3 g/dL / ALK PHOS: 98 U/L / ALT: <5 U/L / AST: 11 U/L / GGT: x                 PT/INR - ( 29 Mar 2019 07:05 )   PT: 14.40 sec;   INR: 1.25 ratio                     Culture - Urine (collected 27 Mar 2019 14:00)  Source: .Urine Clean Catch (Midstream)  Final Report (28 Mar 2019 17:19):    Culture grew 3 or more types of organisms which indicate    collection contamination; consider recollection only if clinically    indicated.        Imaging:    ECG:

## 2019-03-30 LAB
ALBUMIN SERPL ELPH-MCNC: 3.1 G/DL — LOW (ref 3.5–5.2)
ALP SERPL-CCNC: 80 U/L — SIGNIFICANT CHANGE UP (ref 30–115)
ALT FLD-CCNC: <5 U/L — SIGNIFICANT CHANGE UP (ref 0–41)
ANION GAP SERPL CALC-SCNC: 13 MMOL/L — SIGNIFICANT CHANGE UP (ref 7–14)
AST SERPL-CCNC: 11 U/L — SIGNIFICANT CHANGE UP (ref 0–41)
BASOPHILS # BLD AUTO: 0.04 K/UL — SIGNIFICANT CHANGE UP (ref 0–0.2)
BASOPHILS NFR BLD AUTO: 0.7 % — SIGNIFICANT CHANGE UP (ref 0–1)
BILIRUB SERPL-MCNC: 0.8 MG/DL — SIGNIFICANT CHANGE UP (ref 0.2–1.2)
BUN SERPL-MCNC: 14 MG/DL — SIGNIFICANT CHANGE UP (ref 10–20)
CALCIUM SERPL-MCNC: 8.1 MG/DL — LOW (ref 8.5–10.1)
CHLORIDE SERPL-SCNC: 100 MMOL/L — SIGNIFICANT CHANGE UP (ref 98–110)
CO2 SERPL-SCNC: 25 MMOL/L — SIGNIFICANT CHANGE UP (ref 17–32)
CREAT SERPL-MCNC: 0.8 MG/DL — SIGNIFICANT CHANGE UP (ref 0.7–1.5)
EOSINOPHIL # BLD AUTO: 0.36 K/UL — SIGNIFICANT CHANGE UP (ref 0–0.7)
EOSINOPHIL NFR BLD AUTO: 6.1 % — SIGNIFICANT CHANGE UP (ref 0–8)
GLUCOSE BLDC GLUCOMTR-MCNC: 127 MG/DL — HIGH (ref 70–99)
GLUCOSE BLDC GLUCOMTR-MCNC: 140 MG/DL — HIGH (ref 70–99)
GLUCOSE BLDC GLUCOMTR-MCNC: 167 MG/DL — HIGH (ref 70–99)
GLUCOSE BLDC GLUCOMTR-MCNC: 288 MG/DL — HIGH (ref 70–99)
GLUCOSE BLDC GLUCOMTR-MCNC: 75 MG/DL — SIGNIFICANT CHANGE UP (ref 70–99)
GLUCOSE BLDC GLUCOMTR-MCNC: 94 MG/DL — SIGNIFICANT CHANGE UP (ref 70–99)
GLUCOSE SERPL-MCNC: 132 MG/DL — HIGH (ref 70–99)
HCT VFR BLD CALC: 32 % — LOW (ref 37–47)
HGB BLD-MCNC: 10 G/DL — LOW (ref 12–16)
IMM GRANULOCYTES NFR BLD AUTO: 0.5 % — HIGH (ref 0.1–0.3)
INR BLD: 1.23 RATIO — SIGNIFICANT CHANGE UP (ref 0.65–1.3)
LYMPHOCYTES # BLD AUTO: 0.91 K/UL — LOW (ref 1.2–3.4)
LYMPHOCYTES # BLD AUTO: 15.5 % — LOW (ref 20.5–51.1)
MAGNESIUM SERPL-MCNC: 1.9 MG/DL — SIGNIFICANT CHANGE UP (ref 1.8–2.4)
MCHC RBC-ENTMCNC: 24.2 PG — LOW (ref 27–31)
MCHC RBC-ENTMCNC: 31.3 G/DL — LOW (ref 32–37)
MCV RBC AUTO: 77.5 FL — LOW (ref 81–99)
MONOCYTES # BLD AUTO: 0.56 K/UL — SIGNIFICANT CHANGE UP (ref 0.1–0.6)
MONOCYTES NFR BLD AUTO: 9.5 % — HIGH (ref 1.7–9.3)
NEUTROPHILS # BLD AUTO: 3.98 K/UL — SIGNIFICANT CHANGE UP (ref 1.4–6.5)
NEUTROPHILS NFR BLD AUTO: 67.7 % — SIGNIFICANT CHANGE UP (ref 42.2–75.2)
NRBC # BLD: 0 /100 WBCS — SIGNIFICANT CHANGE UP (ref 0–0)
PLATELET # BLD AUTO: 170 K/UL — SIGNIFICANT CHANGE UP (ref 130–400)
POTASSIUM SERPL-MCNC: 3.5 MMOL/L — SIGNIFICANT CHANGE UP (ref 3.5–5)
POTASSIUM SERPL-SCNC: 3.5 MMOL/L — SIGNIFICANT CHANGE UP (ref 3.5–5)
PROT SERPL-MCNC: 5.2 G/DL — LOW (ref 6–8)
PROTHROM AB SERPL-ACNC: 14.1 SEC — HIGH (ref 9.95–12.87)
RBC # BLD: 4.13 M/UL — LOW (ref 4.2–5.4)
RBC # FLD: 15.5 % — HIGH (ref 11.5–14.5)
SODIUM SERPL-SCNC: 138 MMOL/L — SIGNIFICANT CHANGE UP (ref 135–146)
WBC # BLD: 5.88 K/UL — SIGNIFICANT CHANGE UP (ref 4.8–10.8)
WBC # FLD AUTO: 5.88 K/UL — SIGNIFICANT CHANGE UP (ref 4.8–10.8)

## 2019-03-30 PROCEDURE — 99232 SBSQ HOSP IP/OBS MODERATE 35: CPT

## 2019-03-30 RX ORDER — DOCUSATE SODIUM 100 MG
100 CAPSULE ORAL THREE TIMES A DAY
Qty: 0 | Refills: 0 | Status: DISCONTINUED | OUTPATIENT
Start: 2019-03-30 | End: 2019-04-01

## 2019-03-30 RX ORDER — SENNA PLUS 8.6 MG/1
2 TABLET ORAL AT BEDTIME
Qty: 0 | Refills: 0 | Status: DISCONTINUED | OUTPATIENT
Start: 2019-03-30 | End: 2019-04-01

## 2019-03-30 RX ORDER — CHLORHEXIDINE GLUCONATE 213 G/1000ML
1 SOLUTION TOPICAL
Qty: 0 | Refills: 0 | Status: DISCONTINUED | OUTPATIENT
Start: 2019-03-30 | End: 2019-04-01

## 2019-03-30 RX ADMIN — Medication 0.5 MILLIGRAM(S): at 17:56

## 2019-03-30 RX ADMIN — GABAPENTIN 300 MILLIGRAM(S): 400 CAPSULE ORAL at 21:14

## 2019-03-30 RX ADMIN — PERPHENAZINE 4 MILLIGRAM(S): 8 TABLET, FILM COATED ORAL at 21:16

## 2019-03-30 RX ADMIN — Medication 100 MILLIGRAM(S): at 21:17

## 2019-03-30 RX ADMIN — Medication 14 UNIT(S): at 14:05

## 2019-03-30 RX ADMIN — SIMVASTATIN 40 MILLIGRAM(S): 20 TABLET, FILM COATED ORAL at 21:17

## 2019-03-30 RX ADMIN — SENNA PLUS 2 TABLET(S): 8.6 TABLET ORAL at 21:17

## 2019-03-30 RX ADMIN — Medication 100 MILLIGRAM(S): at 13:52

## 2019-03-30 RX ADMIN — CHLORHEXIDINE GLUCONATE 1 APPLICATION(S): 213 SOLUTION TOPICAL at 05:21

## 2019-03-30 RX ADMIN — PANTOPRAZOLE SODIUM 40 MILLIGRAM(S): 20 TABLET, DELAYED RELEASE ORAL at 05:23

## 2019-03-30 RX ADMIN — Medication 40 MILLIGRAM(S): at 12:12

## 2019-03-30 RX ADMIN — Medication 112 MICROGRAM(S): at 05:21

## 2019-03-30 RX ADMIN — Medication 0.5 MILLIGRAM(S): at 05:21

## 2019-03-30 RX ADMIN — AMLODIPINE BESYLATE 5 MILLIGRAM(S): 2.5 TABLET ORAL at 05:21

## 2019-03-30 RX ADMIN — Medication 81 MILLIGRAM(S): at 12:11

## 2019-03-30 RX ADMIN — ENOXAPARIN SODIUM 40 MILLIGRAM(S): 100 INJECTION SUBCUTANEOUS at 12:12

## 2019-03-30 RX ADMIN — GABAPENTIN 300 MILLIGRAM(S): 400 CAPSULE ORAL at 13:52

## 2019-03-30 RX ADMIN — Medication 14 UNIT(S): at 17:52

## 2019-03-30 RX ADMIN — Medication 3: at 14:05

## 2019-03-30 RX ADMIN — CLOPIDOGREL BISULFATE 75 MILLIGRAM(S): 75 TABLET, FILM COATED ORAL at 12:11

## 2019-03-30 RX ADMIN — DONEPEZIL HYDROCHLORIDE 5 MILLIGRAM(S): 10 TABLET, FILM COATED ORAL at 21:14

## 2019-03-30 RX ADMIN — GABAPENTIN 300 MILLIGRAM(S): 400 CAPSULE ORAL at 05:21

## 2019-03-30 RX ADMIN — SODIUM CHLORIDE 75 MILLILITER(S): 9 INJECTION, SOLUTION INTRAVENOUS at 12:10

## 2019-03-30 NOTE — PROGRESS NOTE ADULT - SUBJECTIVE AND OBJECTIVE BOX
Progress Note: General Surgery  Patient: CHITRA KELLY , 83y (1935)Female   MRN: [id number]  Location: [address]  Visit: 03-27-19 Inpatient  Date: 03-30-19 @ 02:17  Hospital Day: 4  Post-op Day:     Procedure/Diagnosis: admitted with partial SBO  Events over 24h: No acute overnight events, patient passing gas and BM, tolerating clears with no nausea or vomiting.    Vitals: T(F): 98.8 (03-30-19 @ 02:13), Max: 99.8 (03-29-19 @ 20:31)  HR: 99 (03-29-19 @ 20:31)  BP: 99/51 (03-29-19 @ 20:31) (99/51 - 166/72)  RR: 18 (03-29-19 @ 20:31)  SpO2: --    In:   03-28-19 @ 07:01  -  03-29-19 @ 07:00  --------------------------------------------------------  IN: 900 mL      Out:   03-28-19 @ 07:01  -  03-29-19 @ 07:00  --------------------------------------------------------  OUT:    Nasoenteral Tube: 350 mL  Total OUT: 350 mL        Net:   03-28-19 @ 07:01  -  03-29-19 @ 07:00  --------------------------------------------------------  NET: 550 mL      Diet: Diet, Clear Liquid (03-29-19 @ 15:47)    IV Fluids: yes no , Type: dextrose 5%. 1000 milliLiter(s) (50 mL/Hr) IV Continuous <Continuous>  lactated ringers. 1000 milliLiter(s) (75 mL/Hr) IV Continuous <Continuous>    Physical Examination:  General Appearance: NAD, alert and cooperative  HEENT: NCAT, WNL  Heart: S1 and S2. No murmurs. Rhythm is regular    Lungs: Clear to auscultation BL   Abdomen:  Positive bowel sounds. Soft, nondistended, nontender    Medications: [Standing]  amLODIPine   Tablet 5 milliGRAM(s) Oral daily  aspirin enteric coated 81 milliGRAM(s) Oral daily  chlorhexidine 4% Liquid 1 Application(s) Topical <User Schedule>  clopidogrel Tablet 75 milliGRAM(s) Oral daily  dextrose 5%. 1000 milliLiter(s) (50 mL/Hr) IV Continuous <Continuous>  dextrose 50% Injectable 12.5 Gram(s) IV Push once  dextrose 50% Injectable 25 Gram(s) IV Push once  dextrose 50% Injectable 25 Gram(s) IV Push once  donepezil 5 milliGRAM(s) Oral at bedtime  enoxaparin Injectable 40 milliGRAM(s) SubCutaneous daily  gabapentin 300 milliGRAM(s) Oral three times a day  influenza   Vaccine 0.5 milliLiter(s) IntraMuscular once  insulin glargine Injectable (LANTUS) 34 Unit(s) SubCutaneous every morning  insulin lispro (HumaLOG) corrective regimen sliding scale   SubCutaneous three times a day before meals  insulin lispro Injectable (HumaLOG) 14 Unit(s) SubCutaneous three times a day before meals  lactated ringers. 1000 milliLiter(s) (75 mL/Hr) IV Continuous <Continuous>  levothyroxine 112 MICROGram(s) Oral daily  LORazepam     Tablet 0.5 milliGRAM(s) Oral two times a day  pantoprazole    Tablet 40 milliGRAM(s) Oral before breakfast  PARoxetine 40 milliGRAM(s) Oral daily  perphenazine 4 milliGRAM(s) Oral at bedtime  QUEtiapine 100 milliGRAM(s) Oral daily  simvastatin 40 milliGRAM(s) Oral at bedtime    DVT Prophylaxis: enoxaparin Injectable 40 milliGRAM(s) SubCutaneous daily    GI Prophylaxis: pantoprazole    Tablet 40 milliGRAM(s) Oral before breakfast    Antibiotics:   Anticoagulation:   Medications:[PRN]  dextrose 40% Gel 15 Gram(s) Oral once PRN  glucagon  Injectable 1 milliGRAM(s) IntraMuscular once PRN    Labs:                        11.7   6.51  )-----------( 262      ( 29 Mar 2019 07:05 )             38.0     03-29    138  |  98  |  22<H>  ----------------------------<  170<H>  3.9   |  27  |  1.1    Ca    8.8      29 Mar 2019 07:05  Mg     1.9     03-29    TPro  6.3  /  Alb  3.6  /  TBili  0.6  /  DBili  x   /  AST  11  /  ALT  <5  /  AlkPhos  98  03-29    LIVER FUNCTIONS - ( 29 Mar 2019 07:05 )  Alb: 3.6 g/dL / Pro: 6.3 g/dL / ALK PHOS: 98 U/L / ALT: <5 U/L / AST: 11 U/L / GGT: x           PT/INR - ( 29 Mar 2019 07:05 )   PT: 14.40 sec;   INR: 1.25 ratio       Urine/Micro:    Culture - Urine (collected 27 Mar 2019 14:00)  Source: .Urine Clean Catch (Midstream)  Final Report (28 Mar 2019 17:19):    Culture grew 3 or more types of organisms which indicate    collection contamination; consider recollection only if clinically    indicated.

## 2019-03-30 NOTE — PROGRESS NOTE ADULT - ASSESSMENT
Assessment:  83y Female patient admitted with partial SBO    Plan:  -home meds  -pain control  -GI/DVT ppx  -encourage ambulation  -incentive spirometer  -continue clears  -IVF  -monitor bowel function    Date/Time: 03-30-19 @ 02:17

## 2019-03-30 NOTE — PROGRESS NOTE ADULT - SUBJECTIVE AND OBJECTIVE BOX
Patient is a 83y old  Female who presents with a chief complaint of SBO (30 Mar 2019 02:17)    Interval events: None        PAST MEDICAL & SURGICAL HISTORY:  Vertigo  Hypothyroid  Brain aneurysm  AAA (abdominal aortic aneurysm)  HLD (hyperlipidemia)  HTN (hypertension)  Depression  Anxiety  Panic disorder  DM (diabetes mellitus)  CVA (cerebral vascular accident)  H/O: hysterectomy  Status post cataract surgery      MEDICATIONS  (STANDING):  amLODIPine   Tablet 5 milliGRAM(s) Oral daily  aspirin enteric coated 81 milliGRAM(s) Oral daily  chlorhexidine 4% Liquid 1 Application(s) Topical <User Schedule>  chlorhexidine 4% Liquid 1 Application(s) Topical <User Schedule>  clopidogrel Tablet 75 milliGRAM(s) Oral daily  dextrose 5%. 1000 milliLiter(s) (50 mL/Hr) IV Continuous <Continuous>  dextrose 50% Injectable 12.5 Gram(s) IV Push once  dextrose 50% Injectable 25 Gram(s) IV Push once  dextrose 50% Injectable 25 Gram(s) IV Push once  docusate sodium 100 milliGRAM(s) Oral three times a day  donepezil 5 milliGRAM(s) Oral at bedtime  enoxaparin Injectable 40 milliGRAM(s) SubCutaneous daily  gabapentin 300 milliGRAM(s) Oral three times a day  influenza   Vaccine 0.5 milliLiter(s) IntraMuscular once  insulin glargine Injectable (LANTUS) 34 Unit(s) SubCutaneous every morning  insulin lispro (HumaLOG) corrective regimen sliding scale   SubCutaneous three times a day before meals  insulin lispro Injectable (HumaLOG) 14 Unit(s) SubCutaneous three times a day before meals  lactated ringers. 1000 milliLiter(s) (75 mL/Hr) IV Continuous <Continuous>  levothyroxine 112 MICROGram(s) Oral daily  LORazepam     Tablet 0.5 milliGRAM(s) Oral two times a day  pantoprazole    Tablet 40 milliGRAM(s) Oral before breakfast  PARoxetine 40 milliGRAM(s) Oral daily  perphenazine 4 milliGRAM(s) Oral at bedtime  senna 2 Tablet(s) Oral at bedtime  simvastatin 40 milliGRAM(s) Oral at bedtime    MEDICATIONS  (PRN):  dextrose 40% Gel 15 Gram(s) Oral once PRN Blood Glucose LESS THAN 70 milliGRAM(s)/deciliter  glucagon  Injectable 1 milliGRAM(s) IntraMuscular once PRN Glucose LESS THAN 70 milligrams/deciliter          Vital Signs Last 24 Hrs  T(C): 37.2 (30 Mar 2019 13:55), Max: 37.7 (29 Mar 2019 20:31)  T(F): 99 (30 Mar 2019 13:55), Max: 99.8 (29 Mar 2019 20:31)  HR: 96 (30 Mar 2019 13:55) (79 - 99)  BP: 131/68 (30 Mar 2019 13:55) (99/51 - 134/59)  BP(mean): --  RR: 18 (30 Mar 2019 13:55) (18 - 18)  SpO2: 94% (30 Mar 2019 06:34) (94% - 94%)  CAPILLARY BLOOD GLUCOSE      POCT Blood Glucose.: 140 mg/dL (30 Mar 2019 17:03)  POCT Blood Glucose.: 288 mg/dL (30 Mar 2019 13:56)  POCT Blood Glucose.: 167 mg/dL (30 Mar 2019 11:53)  POCT Blood Glucose.: 127 mg/dL (30 Mar 2019 07:51)  POCT Blood Glucose.: 166 mg/dL (29 Mar 2019 21:20)    I&O's Summary    30 Mar 2019 07:01  -  30 Mar 2019 17:46  --------------------------------------------------------  IN: 0 mL / OUT: 1 mL / NET: -1 mL        Physical Exam:    -     General :     -      Cardiac:    -      Pulm:    -      GI:    -      Musculoskeletal:    -      Neuro:        Labs:                        10.0   5.88  )-----------( 170      ( 30 Mar 2019 07:19 )             32.0             03-30    138  |  100  |  14  ----------------------------<  132<H>  3.5   |  25  |  0.8    Ca    8.1<L>      30 Mar 2019 07:19  Mg     1.9     03-30    TPro  5.2<L>  /  Alb  3.1<L>  /  TBili  0.8  /  DBili  x   /  AST  11  /  ALT  <5  /  AlkPhos  80  03-30    LIVER FUNCTIONS - ( 30 Mar 2019 07:19 )  Alb: 3.1 g/dL / Pro: 5.2 g/dL / ALK PHOS: 80 U/L / ALT: <5 U/L / AST: 11 U/L / GGT: x                 PT/INR - ( 30 Mar 2019 07:19 )   PT: 14.10 sec;   INR: 1.23 ratio                       Imaging:    ECG: Patient is a 83y old  Female who presents with a chief complaint of SBO (30 Mar 2019 02:17)    Interval events: Tolerated Clears, will advance to full liquid    Today is hospital day 2  Patient is  resting comfortably in bed  Denies Abdominal pain, last BM 2 days ago: bowel regimen ordered  + Bowel sounds,  Passing flatus  Plan: Advance to normal diet tomorrow   -     No surgical intervention for now    PAST MEDICAL & SURGICAL HISTORY:  Vertigo  Hypothyroid  Brain aneurysm  AAA (abdominal aortic aneurysm)  HLD (hyperlipidemia)  HTN (hypertension)  Depression  Anxiety  Panic disorder  DM (diabetes mellitus)  CVA (cerebral vascular accident)  H/O: hysterectomy  Status post cataract surgery      MEDICATIONS  (STANDING):  amLODIPine   Tablet 5 milliGRAM(s) Oral daily  aspirin enteric coated 81 milliGRAM(s) Oral daily  chlorhexidine 4% Liquid 1 Application(s) Topical <User Schedule>  chlorhexidine 4% Liquid 1 Application(s) Topical <User Schedule>  clopidogrel Tablet 75 milliGRAM(s) Oral daily  dextrose 5%. 1000 milliLiter(s) (50 mL/Hr) IV Continuous <Continuous>  dextrose 50% Injectable 12.5 Gram(s) IV Push once  dextrose 50% Injectable 25 Gram(s) IV Push once  dextrose 50% Injectable 25 Gram(s) IV Push once  docusate sodium 100 milliGRAM(s) Oral three times a day  donepezil 5 milliGRAM(s) Oral at bedtime  enoxaparin Injectable 40 milliGRAM(s) SubCutaneous daily  gabapentin 300 milliGRAM(s) Oral three times a day  influenza   Vaccine 0.5 milliLiter(s) IntraMuscular once  insulin glargine Injectable (LANTUS) 34 Unit(s) SubCutaneous every morning  insulin lispro (HumaLOG) corrective regimen sliding scale   SubCutaneous three times a day before meals  insulin lispro Injectable (HumaLOG) 14 Unit(s) SubCutaneous three times a day before meals  lactated ringers. 1000 milliLiter(s) (75 mL/Hr) IV Continuous <Continuous>  levothyroxine 112 MICROGram(s) Oral daily  LORazepam     Tablet 0.5 milliGRAM(s) Oral two times a day  pantoprazole    Tablet 40 milliGRAM(s) Oral before breakfast  PARoxetine 40 milliGRAM(s) Oral daily  perphenazine 4 milliGRAM(s) Oral at bedtime  senna 2 Tablet(s) Oral at bedtime  simvastatin 40 milliGRAM(s) Oral at bedtime    MEDICATIONS  (PRN):  dextrose 40% Gel 15 Gram(s) Oral once PRN Blood Glucose LESS THAN 70 milliGRAM(s)/deciliter  glucagon  Injectable 1 milliGRAM(s) IntraMuscular once PRN Glucose LESS THAN 70 milligrams/deciliter          Vital Signs Last 24 Hrs  T(C): 37.2 (30 Mar 2019 13:55), Max: 37.7 (29 Mar 2019 20:31)  T(F): 99 (30 Mar 2019 13:55), Max: 99.8 (29 Mar 2019 20:31)  HR: 96 (30 Mar 2019 13:55) (79 - 99)  BP: 131/68 (30 Mar 2019 13:55) (99/51 - 134/59)  BP(mean): --  RR: 18 (30 Mar 2019 13:55) (18 - 18)  SpO2: 94% (30 Mar 2019 06:34) (94% - 94%)  CAPILLARY BLOOD GLUCOSE      POCT Blood Glucose.: 140 mg/dL (30 Mar 2019 17:03)  POCT Blood Glucose.: 288 mg/dL (30 Mar 2019 13:56)  POCT Blood Glucose.: 167 mg/dL (30 Mar 2019 11:53)  POCT Blood Glucose.: 127 mg/dL (30 Mar 2019 07:51)  POCT Blood Glucose.: 166 mg/dL (29 Mar 2019 21:20)    I&O's Summary    30 Mar 2019 07:01  -  30 Mar 2019 17:46  --------------------------------------------------------  IN: 0 mL / OUT: 1 mL / NET: -1 mL        Physical Exam:    -  -     General : NAD, laying comfortably in bed    -      Cardiac: S1/S2 appreciated, RRR    -      Pulm: CTA b/l, no adventitious sound    -      GI: Faint BS, ND, NT, Soft, passing flatus    -      Musculoskeletal: Atraumatic, no edema, no skin color changes    -      Neuro: AO x 3, non focal         Labs:                        10.0   5.88  )-----------( 170      ( 30 Mar 2019 07:19 )             32.0             03-30    138  |  100  |  14  ----------------------------<  132<H>  3.5   |  25  |  0.8    Ca    8.1<L>      30 Mar 2019 07:19  Mg     1.9     03-30    TPro  5.2<L>  /  Alb  3.1<L>  /  TBili  0.8  /  DBili  x   /  AST  11  /  ALT  <5  /  AlkPhos  80  03-30    LIVER FUNCTIONS - ( 30 Mar 2019 07:19 )  Alb: 3.1 g/dL / Pro: 5.2 g/dL / ALK PHOS: 80 U/L / ALT: <5 U/L / AST: 11 U/L / GGT: x                 PT/INR - ( 30 Mar 2019 07:19 )   PT: 14.10 sec;   INR: 1.23 ratio                       Imaging:    ECG:

## 2019-03-30 NOTE — PROGRESS NOTE ADULT - SUBJECTIVE AND OBJECTIVE BOX
Patient is a 83y old  Female who presents with a chief complaint of SBO (30 Mar 2019 02:17)    Interval events: Tolerated Clears, will advance to full liquid    Today is hospital day 2  Patient is  resting comfortably in bed  Denies Abdominal pain, last BM 2 days ago: bowel regimen ordered  + Bowel sounds,  Passing flatus  Plan: Advance to normal diet tomorrow   -     No surgical intervention for now    PAST MEDICAL & SURGICAL HISTORY:  Vertigo  Hypothyroid  Brain aneurysm  AAA (abdominal aortic aneurysm)  HLD (hyperlipidemia)  HTN (hypertension)  Depression  Anxiety  Panic disorder  DM (diabetes mellitus)  CVA (cerebral vascular accident)  H/O: hysterectomy  Status post cataract surgery      MEDICATIONS  (STANDING):  amLODIPine   Tablet 5 milliGRAM(s) Oral daily  aspirin enteric coated 81 milliGRAM(s) Oral daily  chlorhexidine 4% Liquid 1 Application(s) Topical <User Schedule>  chlorhexidine 4% Liquid 1 Application(s) Topical <User Schedule>  clopidogrel Tablet 75 milliGRAM(s) Oral daily  dextrose 5%. 1000 milliLiter(s) (50 mL/Hr) IV Continuous <Continuous>  dextrose 50% Injectable 12.5 Gram(s) IV Push once  dextrose 50% Injectable 25 Gram(s) IV Push once  dextrose 50% Injectable 25 Gram(s) IV Push once  docusate sodium 100 milliGRAM(s) Oral three times a day  donepezil 5 milliGRAM(s) Oral at bedtime  enoxaparin Injectable 40 milliGRAM(s) SubCutaneous daily  gabapentin 300 milliGRAM(s) Oral three times a day  influenza   Vaccine 0.5 milliLiter(s) IntraMuscular once  insulin glargine Injectable (LANTUS) 34 Unit(s) SubCutaneous every morning  insulin lispro (HumaLOG) corrective regimen sliding scale   SubCutaneous three times a day before meals  insulin lispro Injectable (HumaLOG) 14 Unit(s) SubCutaneous three times a day before meals  lactated ringers. 1000 milliLiter(s) (75 mL/Hr) IV Continuous <Continuous>  levothyroxine 112 MICROGram(s) Oral daily  LORazepam     Tablet 0.5 milliGRAM(s) Oral two times a day  pantoprazole    Tablet 40 milliGRAM(s) Oral before breakfast  PARoxetine 40 milliGRAM(s) Oral daily  perphenazine 4 milliGRAM(s) Oral at bedtime  senna 2 Tablet(s) Oral at bedtime  simvastatin 40 milliGRAM(s) Oral at bedtime    MEDICATIONS  (PRN):  dextrose 40% Gel 15 Gram(s) Oral once PRN Blood Glucose LESS THAN 70 milliGRAM(s)/deciliter  glucagon  Injectable 1 milliGRAM(s) IntraMuscular once PRN Glucose LESS THAN 70 milligrams/deciliter          Vital Signs Last 24 Hrs  T(C): 37.2 (30 Mar 2019 13:55), Max: 37.7 (29 Mar 2019 20:31)  T(F): 99 (30 Mar 2019 13:55), Max: 99.8 (29 Mar 2019 20:31)  HR: 96 (30 Mar 2019 13:55) (79 - 99)  BP: 131/68 (30 Mar 2019 13:55) (99/51 - 134/59)  BP(mean): --  RR: 18 (30 Mar 2019 13:55) (18 - 18)  SpO2: 94% (30 Mar 2019 06:34) (94% - 94%)  CAPILLARY BLOOD GLUCOSE      POCT Blood Glucose.: 140 mg/dL (30 Mar 2019 17:03)  POCT Blood Glucose.: 288 mg/dL (30 Mar 2019 13:56)  POCT Blood Glucose.: 167 mg/dL (30 Mar 2019 11:53)  POCT Blood Glucose.: 127 mg/dL (30 Mar 2019 07:51)  POCT Blood Glucose.: 166 mg/dL (29 Mar 2019 21:20)    I&O's Summary    30 Mar 2019 07:01  -  30 Mar 2019 17:46  --------------------------------------------------------  IN: 0 mL / OUT: 1 mL / NET: -1 mL        Physical Exam:    -  -     General : NAD, laying comfortably in bed    -      Cardiac: S1/S2 appreciated, RRR    -      Pulm: CTA b/l, no adventitious sound    -      GI: Faint BS, ND, NT, Soft, passing flatus    -      Musculoskeletal: Atraumatic, no edema, no skin color changes    -      Neuro: AO x 3, non focal         Labs:                        10.0   5.88  )-----------( 170      ( 30 Mar 2019 07:19 )             32.0             03-30    138  |  100  |  14  ----------------------------<  132<H>  3.5   |  25  |  0.8    Ca    8.1<L>      30 Mar 2019 07:19  Mg     1.9     03-30    TPro  5.2<L>  /  Alb  3.1<L>  /  TBili  0.8  /  DBili  x   /  AST  11  /  ALT  <5  /  AlkPhos  80  03-30    LIVER FUNCTIONS - ( 30 Mar 2019 07:19 )  Alb: 3.1 g/dL / Pro: 5.2 g/dL / ALK PHOS: 80 U/L / ALT: <5 U/L / AST: 11 U/L / GGT: x                 PT/INR - ( 30 Mar 2019 07:19 )   PT: 14.10 sec;   INR: 1.23 ratio                       Imaging:    ECG:

## 2019-03-30 NOTE — PROGRESS NOTE ADULT - ASSESSMENT
84 yo F with hx of ileus from gastroparesis HTN, HLD, Anxiety/Depression, CVA (5 yrs ago), AAA and Hypothyroidism presents to ED for 2 days hx of abdominal pain. In ED, patient was found to have SBO obstruction on CT abdomen/pelvic. Surgery was consulted and recommended conservative management for now.      Abdominal pain with nausea likely 2/2 small bowel obstruction   - CT abdomen/pelvis: Small bowel dilatation primarily at level of the jejunum with mesenteric congestion. Differential considerations favor mechanical small bowel obstruction; no transition point.  -Fully Advance diet tommorow  -Per Surgery: No interventions for now: okay to advance diet       Mild ALEXANDER on CKD 2 : likely  prerenal 2/2 reduced PO intake and vomiting: Resolved       UTI: asymptomatic   -Positive UA, F/up Ucx  -No antibiotics for now    Incidental Pulmonary nodule   - CT A/P:  Right middle lobe nodule measuring 0.6 cm.   - f/u outpatient.     Abdominal Aortic Aneurysm   - CT A/P : Stable lower abdominal aortic aneurysm just above the level of the bifurcation measuring 4 cm.  - follow up outpatient.     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

## 2019-03-31 LAB
GLUCOSE BLDC GLUCOMTR-MCNC: 109 MG/DL — HIGH (ref 70–99)
GLUCOSE BLDC GLUCOMTR-MCNC: 116 MG/DL — HIGH (ref 70–99)
GLUCOSE BLDC GLUCOMTR-MCNC: 136 MG/DL — HIGH (ref 70–99)
GLUCOSE BLDC GLUCOMTR-MCNC: 204 MG/DL — HIGH (ref 70–99)
GLUCOSE BLDC GLUCOMTR-MCNC: 95 MG/DL — SIGNIFICANT CHANGE UP (ref 70–99)

## 2019-03-31 PROCEDURE — 99232 SBSQ HOSP IP/OBS MODERATE 35: CPT

## 2019-03-31 PROCEDURE — 93306 TTE W/DOPPLER COMPLETE: CPT | Mod: 26

## 2019-03-31 RX ADMIN — DONEPEZIL HYDROCHLORIDE 5 MILLIGRAM(S): 10 TABLET, FILM COATED ORAL at 21:13

## 2019-03-31 RX ADMIN — PERPHENAZINE 4 MILLIGRAM(S): 8 TABLET, FILM COATED ORAL at 21:16

## 2019-03-31 RX ADMIN — PANTOPRAZOLE SODIUM 40 MILLIGRAM(S): 20 TABLET, DELAYED RELEASE ORAL at 05:31

## 2019-03-31 RX ADMIN — AMLODIPINE BESYLATE 5 MILLIGRAM(S): 2.5 TABLET ORAL at 05:31

## 2019-03-31 RX ADMIN — Medication 14 UNIT(S): at 08:01

## 2019-03-31 RX ADMIN — SIMVASTATIN 40 MILLIGRAM(S): 20 TABLET, FILM COATED ORAL at 21:13

## 2019-03-31 RX ADMIN — Medication 81 MILLIGRAM(S): at 11:11

## 2019-03-31 RX ADMIN — Medication 100 MILLIGRAM(S): at 05:31

## 2019-03-31 RX ADMIN — Medication 0.5 MILLIGRAM(S): at 17:07

## 2019-03-31 RX ADMIN — CHLORHEXIDINE GLUCONATE 1 APPLICATION(S): 213 SOLUTION TOPICAL at 05:31

## 2019-03-31 RX ADMIN — Medication 0.5 MILLIGRAM(S): at 05:33

## 2019-03-31 RX ADMIN — Medication 112 MICROGRAM(S): at 05:31

## 2019-03-31 RX ADMIN — Medication 40 MILLIGRAM(S): at 11:11

## 2019-03-31 RX ADMIN — GABAPENTIN 300 MILLIGRAM(S): 400 CAPSULE ORAL at 21:13

## 2019-03-31 RX ADMIN — INSULIN GLARGINE 34 UNIT(S): 100 INJECTION, SOLUTION SUBCUTANEOUS at 08:01

## 2019-03-31 RX ADMIN — GABAPENTIN 300 MILLIGRAM(S): 400 CAPSULE ORAL at 05:31

## 2019-03-31 RX ADMIN — ENOXAPARIN SODIUM 40 MILLIGRAM(S): 100 INJECTION SUBCUTANEOUS at 11:12

## 2019-03-31 RX ADMIN — GABAPENTIN 300 MILLIGRAM(S): 400 CAPSULE ORAL at 13:18

## 2019-03-31 RX ADMIN — Medication 100 MILLIGRAM(S): at 13:18

## 2019-03-31 RX ADMIN — CLOPIDOGREL BISULFATE 75 MILLIGRAM(S): 75 TABLET, FILM COATED ORAL at 11:11

## 2019-03-31 NOTE — PROGRESS NOTE ADULT - SUBJECTIVE AND OBJECTIVE BOX
GENERAL SURGERY PROGRESS NOTE     CHITRA KELLY  83y  Female  Hospital day :4d  POD:  Procedure:   OVERNIGHT EVENTS:  Tmax 100.6.  Passing BM, flatus.    T(F): 100.6 (03-30-19 @ 20:20), Max: 100.6 (03-30-19 @ 20:20)  HR: 91 (03-30-19 @ 20:20) (79 - 96)  BP: 142/64 (03-30-19 @ 20:20) (131/68 - 142/64)  RR: 18 (03-30-19 @ 20:20) (18 - 18)  SpO2: 94% (03-30-19 @ 06:34) (94% - 94%)    DIET/FLUIDS: dextrose 5%. 1000 milliLiter(s) IV Continuous <Continuous>  lactated ringers. 1000 milliLiter(s) IV Continuous <Continuous>     GI proph:  pantoprazole    Tablet 40 milliGRAM(s) Oral before breakfast    AC/ proph: aspirin enteric coated 81 milliGRAM(s) Oral daily    ABx:     PHYSICAL EXAM:  GENERAL: NAD, well-appearing  CHEST/LUNG: Clear to auscultation bilaterally  HEART: Regular rate and rhythm  ABDOMEN: Soft, Nontender, Nondistended;   EXTREMITIES:  No clubbing, cyanosis, or edema      LABS  Labs:  CAPILLARY BLOOD GLUCOSE      POCT Blood Glucose.: 94 mg/dL (30 Mar 2019 22:16)  POCT Blood Glucose.: 75 mg/dL (30 Mar 2019 21:30)  POCT Blood Glucose.: 140 mg/dL (30 Mar 2019 17:03)  POCT Blood Glucose.: 288 mg/dL (30 Mar 2019 13:56)  POCT Blood Glucose.: 167 mg/dL (30 Mar 2019 11:53)  POCT Blood Glucose.: 127 mg/dL (30 Mar 2019 07:51)                          10.0   5.88  )-----------( 170      ( 30 Mar 2019 07:19 )             32.0       Auto Neutrophil %: 67.7 % (03-30-19 @ 07:19)  Auto Immature Granulocyte %: 0.5 % (03-30-19 @ 07:19)    03-30    138  |  100  |  14  ----------------------------<  132<H>  3.5   |  25  |  0.8      Calcium, Total Serum: 8.1 mg/dL (03-30-19 @ 07:19)      LFTs:             5.2  | 0.8  | 11       ------------------[80      ( 30 Mar 2019 07:19 )  3.1  | x    | <5           Lactate, Blood: 2.7 mmol/L (03-28-19 @ 07:31)      Coags:     14.10  ----< 1.23    ( 30 Mar 2019 07:19 )     x                           RADIOLOGY & ADDITIONAL TESTS:      A/P

## 2019-03-31 NOTE — PROGRESS NOTE ADULT - ATTENDING COMMENTS
Pt admitted for vomiting and intermittent diarrhoea and found to have partial small intestine obstruction ..Seen by surgery and kept NPO. Most likely has diabetic gastroparesis.. Will monitor closely without aggressive intervention.  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 Has difficulty with ambulation without support .   Monitor  Blood sugars, lipids panel, HBAIC, TSH,.
Tolerating liquid diet and cherios this am . Had good BM today and no diarrhoea. Blood sugars so far good. No hypoglycemia.  Euthyroid ,heart regular, chest clear, abdomen ,bowel sounds present.  Plan advance diet to solid ,soft.
Feels better . tolerating diet well so far mostly clear liquid. ad large bowel movement earlier. No abdominal distension. Bowel sounds present.. Blood sugars labile. one reading in the high 200 mg. Needs close monitoring. Continue with carb controlled diet. Monitor blood sugars.
Improving . Abdominal distension reduced   Diabetes stable blood sugars.  Will continue present treatment
No new change. Has NG tube with suction on. Has some bowel sounds. Keep npo for now  Bowel sounds present   Heart regular chest \Monitor blood sugars.  Neurology consult.
admitted for psbo  ng tube initially high output.   now non bilious  abdomen soft non tender  states has passed flatus since admission  has had bowel movement   ng removed  started on clears.   if tolerated advance as tolerated
82yo female admitted for partial small bowel obstruction now with bowel function and tolerating diet. No surgical management. Explained to patient that symptoms are likely due to intra-abdominal adhesions from previous surgery, which most often resolve without surgery; however, explained this can recur and she should be aware of her symptoms to return if any concerning issues.
84yo female admitted for partial small bowel obstruction. +bowel function. No surgical intervention.

## 2019-03-31 NOTE — PROGRESS NOTE ADULT - ASSESSMENT
83y Female patient admitted with partial SBO now resolved.  Passing flatus, BM.    Plan:  -continue CLD  -labs  -monitor fever  -home meds  -pain control  -GI/DVT ppx  -encourage ambulation  -incentive spirometer  -IVF as needed 83y Female patient admitted with partial SBO now resolved.  Passing flatus, BM.    Plan:  - Continue diet, ADAT  - No indication for surgical intervention   - Please recall PRN

## 2019-04-01 ENCOUNTER — TRANSCRIPTION ENCOUNTER (OUTPATIENT)
Age: 84
End: 2019-04-01

## 2019-04-01 VITALS
RESPIRATION RATE: 19 BRPM | HEART RATE: 80 BPM | DIASTOLIC BLOOD PRESSURE: 66 MMHG | SYSTOLIC BLOOD PRESSURE: 149 MMHG | TEMPERATURE: 97 F

## 2019-04-01 LAB
ANION GAP SERPL CALC-SCNC: 11 MMOL/L — SIGNIFICANT CHANGE UP (ref 7–14)
BUN SERPL-MCNC: 5 MG/DL — LOW (ref 10–20)
CALCIUM SERPL-MCNC: 8.4 MG/DL — LOW (ref 8.5–10.1)
CHLORIDE SERPL-SCNC: 104 MMOL/L — SIGNIFICANT CHANGE UP (ref 98–110)
CO2 SERPL-SCNC: 26 MMOL/L — SIGNIFICANT CHANGE UP (ref 17–32)
CREAT SERPL-MCNC: 0.6 MG/DL — LOW (ref 0.7–1.5)
GLUCOSE BLDC GLUCOMTR-MCNC: 102 MG/DL — HIGH (ref 70–99)
GLUCOSE BLDC GLUCOMTR-MCNC: 160 MG/DL — HIGH (ref 70–99)
GLUCOSE BLDC GLUCOMTR-MCNC: 230 MG/DL — HIGH (ref 70–99)
GLUCOSE BLDC GLUCOMTR-MCNC: 252 MG/DL — HIGH (ref 70–99)
GLUCOSE SERPL-MCNC: 147 MG/DL — HIGH (ref 70–99)
HCT VFR BLD CALC: 34.5 % — LOW (ref 37–47)
HGB BLD-MCNC: 10.5 G/DL — LOW (ref 12–16)
MCHC RBC-ENTMCNC: 23.9 PG — LOW (ref 27–31)
MCHC RBC-ENTMCNC: 30.4 G/DL — LOW (ref 32–37)
MCV RBC AUTO: 78.6 FL — LOW (ref 81–99)
NRBC # BLD: 0 /100 WBCS — SIGNIFICANT CHANGE UP (ref 0–0)
PLATELET # BLD AUTO: 223 K/UL — SIGNIFICANT CHANGE UP (ref 130–400)
POTASSIUM SERPL-MCNC: 3.9 MMOL/L — SIGNIFICANT CHANGE UP (ref 3.5–5)
POTASSIUM SERPL-SCNC: 3.9 MMOL/L — SIGNIFICANT CHANGE UP (ref 3.5–5)
RBC # BLD: 4.39 M/UL — SIGNIFICANT CHANGE UP (ref 4.2–5.4)
RBC # FLD: 15.1 % — HIGH (ref 11.5–14.5)
SODIUM SERPL-SCNC: 141 MMOL/L — SIGNIFICANT CHANGE UP (ref 135–146)
WBC # BLD: 5.86 K/UL — SIGNIFICANT CHANGE UP (ref 4.8–10.8)
WBC # FLD AUTO: 5.86 K/UL — SIGNIFICANT CHANGE UP (ref 4.8–10.8)

## 2019-04-01 PROCEDURE — 74019 RADEX ABDOMEN 2 VIEWS: CPT | Mod: 26

## 2019-04-01 RX ADMIN — Medication 0.5 MILLIGRAM(S): at 05:40

## 2019-04-01 RX ADMIN — Medication 100 MILLIGRAM(S): at 15:44

## 2019-04-01 RX ADMIN — CHLORHEXIDINE GLUCONATE 1 APPLICATION(S): 213 SOLUTION TOPICAL at 05:37

## 2019-04-01 RX ADMIN — AMLODIPINE BESYLATE 5 MILLIGRAM(S): 2.5 TABLET ORAL at 05:38

## 2019-04-01 RX ADMIN — GABAPENTIN 300 MILLIGRAM(S): 400 CAPSULE ORAL at 05:38

## 2019-04-01 RX ADMIN — Medication 112 MICROGRAM(S): at 05:38

## 2019-04-01 RX ADMIN — Medication 14 UNIT(S): at 08:01

## 2019-04-01 RX ADMIN — Medication 81 MILLIGRAM(S): at 11:45

## 2019-04-01 RX ADMIN — Medication 100 MILLIGRAM(S): at 05:38

## 2019-04-01 RX ADMIN — Medication 1: at 08:01

## 2019-04-01 RX ADMIN — Medication 14 UNIT(S): at 11:48

## 2019-04-01 RX ADMIN — ENOXAPARIN SODIUM 40 MILLIGRAM(S): 100 INJECTION SUBCUTANEOUS at 11:45

## 2019-04-01 RX ADMIN — CLOPIDOGREL BISULFATE 75 MILLIGRAM(S): 75 TABLET, FILM COATED ORAL at 11:45

## 2019-04-01 RX ADMIN — Medication 2: at 11:48

## 2019-04-01 RX ADMIN — GABAPENTIN 300 MILLIGRAM(S): 400 CAPSULE ORAL at 15:45

## 2019-04-01 RX ADMIN — PANTOPRAZOLE SODIUM 40 MILLIGRAM(S): 20 TABLET, DELAYED RELEASE ORAL at 05:38

## 2019-04-01 RX ADMIN — INSULIN GLARGINE 34 UNIT(S): 100 INJECTION, SOLUTION SUBCUTANEOUS at 08:02

## 2019-04-01 RX ADMIN — Medication 40 MILLIGRAM(S): at 11:46

## 2019-04-01 NOTE — DISCHARGE NOTE PROVIDER - HOSPITAL COURSE
82 yo F with hx of ileus from gastroparesis HTN, HLD, Anxiety/Depression, CVA (5 yrs ago), AAA and Hypothyroidism presents to ED for 2 days hx of abdominal pain. Patient reports that she has been having nausea and vomiting for the past few weeks and has to carry buckets around to vomit.  2days ago, she started feeling intermittent diffuse crampy abdominal pain which is progressively worsening. So her PMD urges her to come to ED. Denied fever, chills, chest pain, SOB, change in bowel movement, change in urination.     Currently, patient reports that her abdominal pain and N/V has resolved. Last BM tonight.         In ED, patient was found to have SBO obstruction on CT abdomen/pelvic.     surgeyr consulted, recommended medical management.    pt improved with tolerating advancing the diet.    pt is cleared to be d/c by medicine attending.

## 2019-04-01 NOTE — DISCHARGE NOTE PROVIDER - NSDCCPCAREPLAN_GEN_ALL_CORE_FT
PRINCIPAL DISCHARGE DIAGNOSIS  Diagnosis: SBO (small bowel obstruction)  Assessment and Plan of Treatment: you have been manged medically for bowel obstruction.  you clinically improved.  take medications as precribed  follow up with your primary doctor in 1 week.

## 2019-04-01 NOTE — DISCHARGE NOTE PROVIDER - CARE PROVIDER_API CALL
Divya Du)  EndocrinologyMetabDiabetes; Internal Medicine  [address] [place] [address]  Phone: [phone]  Fax: [phone]  Follow Up Time: 1 week

## 2019-04-01 NOTE — CHART NOTE - NSCHARTNOTEFT_GEN_A_CORE
<<<RESIDENT DISCHARGE NOTE>>>     CHITRA KELLY  MRN-[id number]    VITAL SIGNS:  T(F): 97.3 (04-01-19 @ 13:48), Max: 97.8 (03-31-19 @ 20:34)  HR: 80 (04-01-19 @ 13:48)  BP: 149/66 (04-01-19 @ 13:48)  SpO2: --      PHYSICAL EXAMINATION:  General:  NAD  Pulmonary: normal breath sounds  Cardiovascular: normal s1/s2 , no murmurs  Gastrointestinal/Abdomen & Pelvis: soft , non distended, +BS  Neurologic/Motor: AAO*3, non focal     TEST RESULTS:                        10.5   5.86  )-----------( 223      ( 01 Apr 2019 07:11 )             34.5       04-01    141  |  104  |  5<L>  ----------------------------<  147<H>  3.9   |  26  |  0.6<L>    Ca    8.4<L>      01 Apr 2019 07:11        FINAL DISCHARGE INTERVIEW:  Resident(s) Present: (Name: Dr. Bradley Sánchez), RN Present: (Name:  Autumn)    DISCHARGE MEDICATION RECONCILIATION  reviewed with Attending (Name: Dr. Du)    DISPOSITION:  Home

## 2019-04-03 DIAGNOSIS — F41.9 ANXIETY DISORDER, UNSPECIFIED: ICD-10-CM

## 2019-04-03 DIAGNOSIS — E03.9 HYPOTHYROIDISM, UNSPECIFIED: ICD-10-CM

## 2019-04-03 DIAGNOSIS — Z88.0 ALLERGY STATUS TO PENICILLIN: ICD-10-CM

## 2019-04-03 DIAGNOSIS — N18.2 CHRONIC KIDNEY DISEASE, STAGE 2 (MILD): ICD-10-CM

## 2019-04-03 DIAGNOSIS — Z91.041 RADIOGRAPHIC DYE ALLERGY STATUS: ICD-10-CM

## 2019-04-03 DIAGNOSIS — F32.9 MAJOR DEPRESSIVE DISORDER, SINGLE EPISODE, UNSPECIFIED: ICD-10-CM

## 2019-04-03 DIAGNOSIS — I69.328 OTHER SPEECH AND LANGUAGE DEFICITS FOLLOWING CEREBRAL INFARCTION: ICD-10-CM

## 2019-04-03 DIAGNOSIS — Z91.013 ALLERGY TO SEAFOOD: ICD-10-CM

## 2019-04-03 DIAGNOSIS — Z87.891 PERSONAL HISTORY OF NICOTINE DEPENDENCE: ICD-10-CM

## 2019-04-03 DIAGNOSIS — K31.84 GASTROPARESIS: ICD-10-CM

## 2019-04-03 DIAGNOSIS — I67.1 CEREBRAL ANEURYSM, NONRUPTURED: ICD-10-CM

## 2019-04-03 DIAGNOSIS — N17.9 ACUTE KIDNEY FAILURE, UNSPECIFIED: ICD-10-CM

## 2019-04-03 DIAGNOSIS — K56.600 PARTIAL INTESTINAL OBSTRUCTION, UNSPECIFIED AS TO CAUSE: ICD-10-CM

## 2019-04-03 DIAGNOSIS — R26.2 DIFFICULTY IN WALKING, NOT ELSEWHERE CLASSIFIED: ICD-10-CM

## 2019-04-03 DIAGNOSIS — I12.9 HYPERTENSIVE CHRONIC KIDNEY DISEASE WITH STAGE 1 THROUGH STAGE 4 CHRONIC KIDNEY DISEASE, OR UNSPECIFIED CHRONIC KIDNEY DISEASE: ICD-10-CM

## 2019-04-03 DIAGNOSIS — I71.4 ABDOMINAL AORTIC ANEURYSM, WITHOUT RUPTURE: ICD-10-CM

## 2019-04-03 DIAGNOSIS — E11.43 TYPE 2 DIABETES MELLITUS WITH DIABETIC AUTONOMIC (POLY)NEUROPATHY: ICD-10-CM

## 2019-04-03 DIAGNOSIS — E11.22 TYPE 2 DIABETES MELLITUS WITH DIABETIC CHRONIC KIDNEY DISEASE: ICD-10-CM

## 2019-04-03 DIAGNOSIS — R10.9 UNSPECIFIED ABDOMINAL PAIN: ICD-10-CM

## 2019-04-03 DIAGNOSIS — R91.1 SOLITARY PULMONARY NODULE: ICD-10-CM

## 2019-04-03 DIAGNOSIS — Z79.4 LONG TERM (CURRENT) USE OF INSULIN: ICD-10-CM

## 2019-05-20 ENCOUNTER — EMERGENCY (EMERGENCY)
Facility: HOSPITAL | Age: 84
LOS: 0 days | Discharge: HOME | End: 2019-05-20
Attending: EMERGENCY MEDICINE | Admitting: EMERGENCY MEDICINE
Payer: MEDICARE

## 2019-05-20 VITALS
DIASTOLIC BLOOD PRESSURE: 74 MMHG | OXYGEN SATURATION: 98 % | HEART RATE: 82 BPM | RESPIRATION RATE: 18 BRPM | SYSTOLIC BLOOD PRESSURE: 141 MMHG | TEMPERATURE: 97 F

## 2019-05-20 VITALS
OXYGEN SATURATION: 98 % | DIASTOLIC BLOOD PRESSURE: 78 MMHG | HEART RATE: 78 BPM | SYSTOLIC BLOOD PRESSURE: 136 MMHG | TEMPERATURE: 97 F

## 2019-05-20 DIAGNOSIS — Z79.4 LONG TERM (CURRENT) USE OF INSULIN: ICD-10-CM

## 2019-05-20 DIAGNOSIS — Z98.49 CATARACT EXTRACTION STATUS, UNSPECIFIED EYE: Chronic | ICD-10-CM

## 2019-05-20 DIAGNOSIS — Z98.890 OTHER SPECIFIED POSTPROCEDURAL STATES: Chronic | ICD-10-CM
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
## 2019-05-20 DIAGNOSIS — Z91.013 ALLERGY TO SEAFOOD: ICD-10-CM

## 2019-05-20 DIAGNOSIS — S30.1XXA CONTUSION OF ABDOMINAL WALL, INITIAL ENCOUNTER: ICD-10-CM
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
## 2019-05-20 DIAGNOSIS — E78.5 HYPERLIPIDEMIA, UNSPECIFIED: ICD-10-CM

## 2019-05-20 DIAGNOSIS — S80.02XA CONTUSION OF LEFT KNEE, INITIAL ENCOUNTER: ICD-10-CM
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
## 2019-05-20 DIAGNOSIS — I10 ESSENTIAL (PRIMARY) HYPERTENSION: ICD-10-CM

## 2019-05-20 DIAGNOSIS — R07.89 OTHER CHEST PAIN: ICD-10-CM

## 2019-05-20 DIAGNOSIS — E11.9 TYPE 2 DIABETES MELLITUS WITHOUT COMPLICATIONS: ICD-10-CM

## 2019-05-20 LAB
ALBUMIN SERPL ELPH-MCNC: 4.2 G/DL — SIGNIFICANT CHANGE UP (ref 3.5–5.2)
ALP SERPL-CCNC: 108 U/L — SIGNIFICANT CHANGE UP (ref 30–115)
ALT FLD-CCNC: <5 U/L — SIGNIFICANT CHANGE UP (ref 0–41)
ANION GAP SERPL CALC-SCNC: 15 MMOL/L — HIGH (ref 7–14)
AST SERPL-CCNC: 10 U/L — SIGNIFICANT CHANGE UP (ref 0–41)
BASOPHILS # BLD AUTO: 0.12 K/UL — SIGNIFICANT CHANGE UP (ref 0–0.2)
BASOPHILS NFR BLD AUTO: 1.1 % — HIGH (ref 0–1)
BILIRUB SERPL-MCNC: 0.3 MG/DL — SIGNIFICANT CHANGE UP (ref 0.2–1.2)
BUN SERPL-MCNC: 19 MG/DL — SIGNIFICANT CHANGE UP (ref 10–20)
CALCIUM SERPL-MCNC: 9.7 MG/DL — SIGNIFICANT CHANGE UP (ref 8.5–10.1)
CHLORIDE SERPL-SCNC: 98 MMOL/L — SIGNIFICANT CHANGE UP (ref 98–110)
CO2 SERPL-SCNC: 25 MMOL/L — SIGNIFICANT CHANGE UP (ref 17–32)
CREAT SERPL-MCNC: 0.7 MG/DL — SIGNIFICANT CHANGE UP (ref 0.7–1.5)
EOSINOPHIL # BLD AUTO: 0.38 K/UL — SIGNIFICANT CHANGE UP (ref 0–0.7)
EOSINOPHIL NFR BLD AUTO: 3.3 % — SIGNIFICANT CHANGE UP (ref 0–8)
GLUCOSE SERPL-MCNC: 253 MG/DL — HIGH (ref 70–99)
HCT VFR BLD CALC: 37.1 % — SIGNIFICANT CHANGE UP (ref 37–47)
HGB BLD-MCNC: 11.5 G/DL — LOW (ref 12–16)
IMM GRANULOCYTES NFR BLD AUTO: 0.4 % — HIGH (ref 0.1–0.3)
LIDOCAIN IGE QN: 41 U/L — SIGNIFICANT CHANGE UP (ref 7–60)
LYMPHOCYTES # BLD AUTO: 1.74 K/UL — SIGNIFICANT CHANGE UP (ref 1.2–3.4)
LYMPHOCYTES # BLD AUTO: 15.3 % — LOW (ref 20.5–51.1)
MCHC RBC-ENTMCNC: 24 PG — LOW (ref 27–31)
MCHC RBC-ENTMCNC: 31 G/DL — LOW (ref 32–37)
MCV RBC AUTO: 77.3 FL — LOW (ref 81–99)
MONOCYTES # BLD AUTO: 0.84 K/UL — HIGH (ref 0.1–0.6)
MONOCYTES NFR BLD AUTO: 7.4 % — SIGNIFICANT CHANGE UP (ref 1.7–9.3)
NEUTROPHILS # BLD AUTO: 8.27 K/UL — HIGH (ref 1.4–6.5)
NEUTROPHILS NFR BLD AUTO: 72.5 % — SIGNIFICANT CHANGE UP (ref 42.2–75.2)
NRBC # BLD: 0 /100 WBCS — SIGNIFICANT CHANGE UP (ref 0–0)
PLATELET # BLD AUTO: 291 K/UL — SIGNIFICANT CHANGE UP (ref 130–400)
POTASSIUM SERPL-MCNC: 4.8 MMOL/L — SIGNIFICANT CHANGE UP (ref 3.5–5)
POTASSIUM SERPL-SCNC: 4.8 MMOL/L — SIGNIFICANT CHANGE UP (ref 3.5–5)
PROT SERPL-MCNC: 6.9 G/DL — SIGNIFICANT CHANGE UP (ref 6–8)
RBC # BLD: 4.8 M/UL — SIGNIFICANT CHANGE UP (ref 4.2–5.4)
RBC # FLD: 15.3 % — HIGH (ref 11.5–14.5)
SODIUM SERPL-SCNC: 138 MMOL/L — SIGNIFICANT CHANGE UP (ref 135–146)
WBC # BLD: 11.39 K/UL — HIGH (ref 4.8–10.8)
WBC # FLD AUTO: 11.39 K/UL — HIGH (ref 4.8–10.8)

## 2019-05-20 PROCEDURE — 99284 EMERGENCY DEPT VISIT MOD MDM: CPT

## 2019-05-20 PROCEDURE — 93010 ELECTROCARDIOGRAM REPORT: CPT

## 2019-05-20 PROCEDURE — 74176 CT ABD & PELVIS W/O CONTRAST: CPT | Mod: 26

## 2019-05-20 PROCEDURE — 71250 CT THORAX DX C-: CPT | Mod: 26

## 2019-05-20 PROCEDURE — 73564 X-RAY EXAM KNEE 4 OR MORE: CPT | Mod: 26,LT

## 2019-05-20 PROCEDURE — 70450 CT HEAD/BRAIN W/O DYE: CPT | Mod: 26

## 2019-05-20 PROCEDURE — 72125 CT NECK SPINE W/O DYE: CPT | Mod: 26

## 2019-05-20 RX ORDER — ACETAMINOPHEN 500 MG
650 TABLET ORAL ONCE
Refills: 0 | Status: COMPLETED | OUTPATIENT
Start: 2019-05-20 | End: 2019-05-20

## 2019-05-20 RX ORDER — TETANUS TOXOID, REDUCED DIPHTHERIA TOXOID AND ACELLULAR PERTUSSIS VACCINE, ADSORBED 5; 2.5; 8; 8; 2.5 [IU]/.5ML; [IU]/.5ML; UG/.5ML; UG/.5ML; UG/.5ML
0.5 SUSPENSION INTRAMUSCULAR ONCE
Refills: 0 | Status: DISCONTINUED | OUTPATIENT
Start: 2019-05-20 | End: 2019-05-20

## 2019-05-20 RX ORDER — TETANUS TOXOID, REDUCED DIPHTHERIA TOXOID AND ACELLULAR PERTUSSIS VACCINE, ADSORBED 5; 2.5; 8; 8; 2.5 [IU]/.5ML; [IU]/.5ML; UG/.5ML; UG/.5ML; UG/.5ML
0.5 SUSPENSION INTRAMUSCULAR ONCE
Refills: 0 | Status: COMPLETED | OUTPATIENT
Start: 2019-05-20 | End: 2019-05-20

## 2019-05-20 RX ORDER — KETOROLAC TROMETHAMINE 30 MG/ML
30 SYRINGE (ML) INJECTION ONCE
Refills: 0 | Status: DISCONTINUED | OUTPATIENT
Start: 2019-05-20 | End: 2019-05-20

## 2019-05-20 RX ADMIN — Medication 650 MILLIGRAM(S): at 11:41

## 2019-05-20 RX ADMIN — Medication 30 MILLIGRAM(S): at 13:47

## 2019-05-20 RX ADMIN — TETANUS TOXOID, REDUCED DIPHTHERIA TOXOID AND ACELLULAR PERTUSSIS VACCINE, ADSORBED 0.5 MILLILITER(S): 5; 2.5; 8; 8; 2.5 SUSPENSION INTRAMUSCULAR at 13:06

## 2019-05-20 NOTE — ED ADULT NURSE NOTE - OBJECTIVE STATEMENT
Pt states she fell forward, did not hit head/ Pts family member states she has been falling more often.

## 2019-05-20 NOTE — ED PROVIDER NOTE - NS ED ROS FT
Constitutional:  no fevers, no chills, no malaise  Eyes:  No visual changes  ENMT: No neck pain or stiffness, no nasal congestion  Cardiac:  No chest pain  Respiratory:  No cough or sob  GI:  No nausea, vomiting, diarrhea or abdominal pain.  MS:  No back pain, no joint pain.  Neuro:  No headache, no dizziness, no change in mental status  Except as documented in the HPI,  all other systems are negative

## 2019-05-20 NOTE — ED ADULT NURSE NOTE - INTERVENTIONS DEFINITIONS
Stretcher in lowest position, wheels locked, appropriate side rails in place/Non-slip footwear when patient is off stretcher/Instruct patient to call for assistance/Physically safe environment: no spills, clutter or unnecessary equipment/[place] to call system/Provide visual cue, wrist band, yellow gown, etc.

## 2019-05-20 NOTE — ED PROVIDER NOTE - CLINICAL SUMMARY MEDICAL DECISION MAKING FREE TEXT BOX
pt here with right sided chest wall pain/bruising and abd wall bruising that occurred a few days prior to arrival s/p mechanical fall.  no cp, no sob, no syncope.  no head injury.  pt is on plavix.  no other blood thinners.  no anticoagulation.    Pt had pan scan.  CT negative for any traumatic findings.  pt nontoxic, well appearing.  I spoke to pt at length about all results on CT including adnexal finding and the abdominal aortic aneureysm.  pt was already aware of the aneurysm.  pt informed to follow up for this.  Pt aware of need to follow up with gyn regarding the adnexal finding and need to have pelvic sono as outpatient.  pt understood.  pt dc. pt here with right sided chest wall pain/bruising and abd wall bruising that occurred a few days prior to arrival s/p mechanical fall.  no cp, no sob, no syncope.  no head injury.  pt is on plavix.  no other blood thinners.  no anticoagulation.  Pt has reproducible tenderness to chest wall at site of the injury.    pt had NO Other chest pain.  no current sob in the ED.  pt breathing comfortably.    Pt had pan scan.  CT negative for any traumatic findings.  pt nontoxic, well appearing.  I spoke to pt at length about all results on CT including adnexal finding and the abdominal aortic aneureysm.  pt was already aware of the aneurysm.  pt informed to follow up for this.  Pt aware of need to follow up with gyn regarding the adnexal finding and need to have pelvic sono as outpatient.  pt understood.  pt shay.

## 2019-05-20 NOTE — ED ADULT TRIAGE NOTE - CHIEF COMPLAINT QUOTE
as per daughter two days ago she fell in front of [employer] , and now her ribs are hurting her, and she is becoming short of breath

## 2019-05-20 NOTE — ED PROVIDER NOTE - ATTENDING CONTRIBUTION TO CARE
84 yo f with pmh of CVA presents with right sided pain s/p mechanical fall a few days ago.  pt tripped and fell.  no head injury, no loc, no nausea, no vomiting.  Pt with right sided rib pain and right abd pain.  no cp, no sob.  no numbness, no weakness, no back pain.    Pt with right rib and left knee injury.  awake, alert.  neck supple.  lungs clear.  abd soft, nontender. + old bruising to the right abdomen.  + tenderness to right ribs.  NO hip tenderness,  flexion/extension intact in both hip.  pelvis stable.  LLE:  bruising to left knee with healing abrasion.  rom of knee intact, flexion, extension intact.  Pt is on plavix, no other blood thinners.  p:  ct, xrays, tetanus, reassess. 82 yo f with pmh of CVA presents with right sided pain s/p mechanical fall a few days ago.  pt tripped and fell.  no head injury, no loc, no nausea, no vomiting.  Pt with right sided rib pain and right abd pain.  no cp, no sob.  no numbness, no weakness, no back pain.    Fall was mechanical, no syncope, no cp, no sob, no dizziness.   Pt with right rib and left knee injury.  awake, alert.  neck supple.  lungs clear.  abd soft, nontender. + old bruising to the right abdomen.  + tenderness to right ribs.  NO hip tenderness,  flexion/extension intact in both hip.  pelvis stable.  LLE:  bruising to left knee with healing abrasion.  rom of knee intact, flexion, extension intact.  Pt is on plavix, no other blood thinners.  p:  ct, xrays, tetanus, reassess.

## 2019-05-20 NOTE — ED PROVIDER NOTE - OBJECTIVE STATEMENT
82 yo f with pmh of CVA presents with right sided pain s/p mechanical fall a few days ago.  pt tripped and fell.  no head injury, no loc, no nausea, no vomiting.  Pt with right sided rib pain and right abd pain.  no cp, no sob.  no numbness, no weakness, no back pain.    Fall was mechanical, no syncope, no cp, no sob, no dizziness.   Pt with right rib and left knee injury.  pt usually uses a walker and wasn't; using a walker when she fell.

## 2019-05-20 NOTE — ED PROVIDER NOTE - PHYSICAL EXAMINATION
CONSTITUTIONAL: Well-developed; well-nourished; in no acute distress, nontoxic appearing  SKIN: skin exam is warm and dry,  HEAD: Normocephalic; atraumatic.  EYES: conjunctiva and sclera clear.  ENT: MMM, no nasal congestion  NECK: Supple; non tender.  ROM intact.  CARD: S1, S2 normal, no murmur  RESP: No wheezes, rales or rhonchi. Good air movement bilaterally  ABD: soft; non-distended; non-tender. No Rebound, No guarding, pt with bruising to right side of abdomen  EXT: Normal ROM. No cyanosis or edema. Left knee with healing abrasion, rom of knee intact, flexion/extension intact, no tenderness  NEURO: awake, alert, following commands, oriented, grossly unremarkable. No Focal deficits. GCS 15.  motor/sensation intact  PSYCH: Cooperative, appropriate. CONSTITUTIONAL: Well-developed; well-nourished; in no acute distress, nontoxic appearing  SKIN: skin exam is warm and dry,  HEAD: Normocephalic; atraumatic.  EYES: conjunctiva and sclera clear.  ENT: MMM, no nasal congestion  NECK: Supple; non tender.  ROM intact.  CARD: S1, S2 normal, no murmur  CHEST:  pt with localized tenderness to right sided chest wall/ribs at site of fall.  no crepitus  RESP: No wheezes, rales or rhonchi. Good air movement bilaterally  ABD: soft; non-distended; non-tender. No Rebound, No guarding, pt with bruising to right side of abdomen  EXT: Normal ROM. No cyanosis or edema. Left knee with healing abrasion, rom of knee intact, flexion/extension intact, no tenderness  NEURO: awake, alert, following commands, oriented, grossly unremarkable. No Focal deficits. GCS 15.  motor/sensation intact  PSYCH: Cooperative, appropriate.

## 2019-06-15 NOTE — H&P ADULT - DOES THIS PATIENT HAVE A HISTORY OF OR HAS BEEN DX WITH HEART FAILURE?
Patient is chronically on statin. Will continue for now. Monitor clinically. Last LDL was   Lab Results   Component Value Date    LDLCALC 147.8 06/14/2019         yes

## 2019-07-03 ENCOUNTER — EMERGENCY (EMERGENCY)
Facility: HOSPITAL | Age: 84
LOS: 0 days | Discharge: HOME | End: 2019-07-04
Attending: EMERGENCY MEDICINE | Admitting: EMERGENCY MEDICINE
Payer: MEDICARE

## 2019-07-03 VITALS
SYSTOLIC BLOOD PRESSURE: 163 MMHG | OXYGEN SATURATION: 97 % | RESPIRATION RATE: 17 BRPM | HEART RATE: 59 BPM | DIASTOLIC BLOOD PRESSURE: 88 MMHG | TEMPERATURE: 97 F

## 2019-07-03 DIAGNOSIS — R07.89 OTHER CHEST PAIN: ICD-10-CM

## 2019-07-03 DIAGNOSIS — E03.9 HYPOTHYROIDISM, UNSPECIFIED: ICD-10-CM

## 2019-07-03 DIAGNOSIS — Z98.890 OTHER SPECIFIED POSTPROCEDURAL STATES: Chronic | ICD-10-CM

## 2019-07-03 DIAGNOSIS — E11.9 TYPE 2 DIABETES MELLITUS WITHOUT COMPLICATIONS: ICD-10-CM

## 2019-07-03 DIAGNOSIS — Z98.49 CATARACT EXTRACTION STATUS, UNSPECIFIED EYE: Chronic | ICD-10-CM

## 2019-07-03 DIAGNOSIS — I71.4 ABDOMINAL AORTIC ANEURYSM, WITHOUT RUPTURE: ICD-10-CM

## 2019-07-03 DIAGNOSIS — I10 ESSENTIAL (PRIMARY) HYPERTENSION: ICD-10-CM

## 2019-07-03 DIAGNOSIS — Z86.73 PERSONAL HISTORY OF TRANSIENT ISCHEMIC ATTACK (TIA), AND CEREBRAL INFARCTION WITHOUT RESIDUAL DEFICITS: ICD-10-CM

## 2019-07-03 DIAGNOSIS — E78.5 HYPERLIPIDEMIA, UNSPECIFIED: ICD-10-CM

## 2019-07-03 LAB
ALBUMIN SERPL ELPH-MCNC: 3.8 G/DL — SIGNIFICANT CHANGE UP (ref 3.5–5.2)
ALP SERPL-CCNC: 108 U/L — SIGNIFICANT CHANGE UP (ref 30–115)
ALT FLD-CCNC: 7 U/L — SIGNIFICANT CHANGE UP (ref 0–41)
ANION GAP SERPL CALC-SCNC: 14 MMOL/L — SIGNIFICANT CHANGE UP (ref 7–14)
AST SERPL-CCNC: 15 U/L — SIGNIFICANT CHANGE UP (ref 0–41)
BASOPHILS # BLD AUTO: 0.07 K/UL — SIGNIFICANT CHANGE UP (ref 0–0.2)
BASOPHILS NFR BLD AUTO: 0.8 % — SIGNIFICANT CHANGE UP (ref 0–1)
BILIRUB SERPL-MCNC: 0.2 MG/DL — SIGNIFICANT CHANGE UP (ref 0.2–1.2)
BUN SERPL-MCNC: 14 MG/DL — SIGNIFICANT CHANGE UP (ref 10–20)
CALCIUM SERPL-MCNC: 9.8 MG/DL — SIGNIFICANT CHANGE UP (ref 8.5–10.1)
CHLORIDE SERPL-SCNC: 96 MMOL/L — LOW (ref 98–110)
CO2 SERPL-SCNC: 25 MMOL/L — SIGNIFICANT CHANGE UP (ref 17–32)
CREAT SERPL-MCNC: 0.8 MG/DL — SIGNIFICANT CHANGE UP (ref 0.7–1.5)
EOSINOPHIL # BLD AUTO: 0.28 K/UL — SIGNIFICANT CHANGE UP (ref 0–0.7)
EOSINOPHIL NFR BLD AUTO: 3.1 % — SIGNIFICANT CHANGE UP (ref 0–8)
GLUCOSE SERPL-MCNC: 156 MG/DL — HIGH (ref 70–99)
HCT VFR BLD CALC: 33.6 % — LOW (ref 37–47)
HGB BLD-MCNC: 10.8 G/DL — LOW (ref 12–16)
IMM GRANULOCYTES NFR BLD AUTO: 0.6 % — HIGH (ref 0.1–0.3)
LYMPHOCYTES # BLD AUTO: 1.81 K/UL — SIGNIFICANT CHANGE UP (ref 1.2–3.4)
LYMPHOCYTES # BLD AUTO: 20.3 % — LOW (ref 20.5–51.1)
MCHC RBC-ENTMCNC: 24.9 PG — LOW (ref 27–31)
MCHC RBC-ENTMCNC: 32.1 G/DL — SIGNIFICANT CHANGE UP (ref 32–37)
MCV RBC AUTO: 77.4 FL — LOW (ref 81–99)
MONOCYTES # BLD AUTO: 0.63 K/UL — HIGH (ref 0.1–0.6)
MONOCYTES NFR BLD AUTO: 7.1 % — SIGNIFICANT CHANGE UP (ref 1.7–9.3)
NEUTROPHILS # BLD AUTO: 6.09 K/UL — SIGNIFICANT CHANGE UP (ref 1.4–6.5)
NEUTROPHILS NFR BLD AUTO: 68.1 % — SIGNIFICANT CHANGE UP (ref 42.2–75.2)
NRBC # BLD: 0 /100 WBCS — SIGNIFICANT CHANGE UP (ref 0–0)
PLATELET # BLD AUTO: 272 K/UL — SIGNIFICANT CHANGE UP (ref 130–400)
POTASSIUM SERPL-MCNC: 4.2 MMOL/L — SIGNIFICANT CHANGE UP (ref 3.5–5)
POTASSIUM SERPL-SCNC: 4.2 MMOL/L — SIGNIFICANT CHANGE UP (ref 3.5–5)
PROT SERPL-MCNC: 6.8 G/DL — SIGNIFICANT CHANGE UP (ref 6–8)
RBC # BLD: 4.34 M/UL — SIGNIFICANT CHANGE UP (ref 4.2–5.4)
RBC # FLD: 14.9 % — HIGH (ref 11.5–14.5)
SODIUM SERPL-SCNC: 135 MMOL/L — SIGNIFICANT CHANGE UP (ref 135–146)
TROPONIN T SERPL-MCNC: <0.01 NG/ML — SIGNIFICANT CHANGE UP
TROPONIN T SERPL-MCNC: <0.01 NG/ML — SIGNIFICANT CHANGE UP
WBC # BLD: 8.93 K/UL — SIGNIFICANT CHANGE UP (ref 4.8–10.8)
WBC # FLD AUTO: 8.93 K/UL — SIGNIFICANT CHANGE UP (ref 4.8–10.8)

## 2019-07-03 PROCEDURE — 71046 X-RAY EXAM CHEST 2 VIEWS: CPT | Mod: 26

## 2019-07-03 PROCEDURE — 93010 ELECTROCARDIOGRAM REPORT: CPT | Mod: 76

## 2019-07-03 PROCEDURE — 99220: CPT

## 2019-07-03 RX ADMIN — Medication 1 MILLIGRAM(S): at 22:19

## 2019-07-03 NOTE — ED PROVIDER NOTE - CLINICAL SUMMARY MEDICAL DECISION MAKING FREE TEXT BOX
pt here for atypical chest pressure, ed w/u negative, trop negative ekg unchanged, pt feeling well. will obs for acs r/o 2mn

## 2019-07-03 NOTE — ED CDU PROVIDER INITIAL DAY NOTE - DIAGNOSIS COUNSELING, MDM
conducted a detailed discussion... Chest pain >> related to swallowing. Stress neg. Will need GI evaluation.

## 2019-07-03 NOTE — ED PROVIDER NOTE - INTERPRETATION
sinus bradycardia/normal sinus rhythm, Normal axis, Normal DC interval and QRS complex. There are no acute ischemic ST or T-wave changes.

## 2019-07-03 NOTE — ED ADULT NURSE NOTE - CHPI ED NUR SYMPTOMS NEG
no fever/no congestion/no nausea/no diaphoresis/no back pain/no shortness of breath/no chills/no dizziness/no syncope/no vomiting

## 2019-07-03 NOTE — ED CDU PROVIDER INITIAL DAY NOTE - OBJECTIVE STATEMENT
84y/o female with pmh of dm, htn, hld, hypothyroid, cva, pt. states earlier today she ate a bagel and after that she felt some discomfort in her lower sternal area. pt. denies cp, sob, nausea, vomiting, fever, cough. pmd dr. Du, no cardiologist

## 2019-07-03 NOTE — ED PROVIDER NOTE - NS ED ROS FT
General: No fevers, chills, nausea, vomiting  Eyes:  No visual changes, eye pain or discharge.  ENMT:  No hearing changes, pain, no sore throat or runny nose, no difficulty swallowing  Cardiac:  No chest pain, SOB or edema.  Respiratory:  No cough or respiratory distress. No hemoptysis. No history of asthma or RAD.  GI:  No nausea, vomiting, diarrhea or abdominal pain.  :  No dysuria, frequency or burning.  MS:  No myalgia, muscle weakness, joint pain or back pain.  Neuro:  No headache or weakness.  No LOC.  Skin:  No skin rash.   Endocrine: No history of thyroid disease or diabetes.

## 2019-07-03 NOTE — ED PROVIDER NOTE - PHYSICAL EXAMINATION
CONSTITUTIONAL: Well-developed; well-nourished, obese; in no acute distress, speaking in full sentences  SKIN: warm, dry  HEAD: Normocephalic; atraumatic  EYES: PERRL, EOMI, no conjunctival erythema  ENT: No nasal discharge; airway clear, mucous membranes moist  NECK: Supple; non tender, FROM  CARD: +S1, S2 Regular rate and rhythm. radial 2+  RESP: No wheezes, rales or rhonchi. CTABL  ABD: soft ntnd, no rebound, no guarding, no rigidity, neg murphys  EXT: moves all extremities, ambulates wo assistance, decreased pedal pulse on LLL  NEURO: Alert, oriented, grossly unremarkable, no focal deficits, cn ii-xii grossly intact  PSYCH: Cooperative, appropriate

## 2019-07-03 NOTE — ED ADULT NURSE NOTE - CHIEF COMPLAINT QUOTE
chest & epigastric pain x 2 days. sent from urgent care center for further evaluation. (EKG LBBB) Pt states, "I feel like something is stock in my chest and it hurts."

## 2019-07-03 NOTE — ED ADULT TRIAGE NOTE - CHIEF COMPLAINT QUOTE
chest & epigastric pain x 2 days. sent from urgent care center for further evaluation. (EKG LBBB) chest & epigastric pain x 2 days. sent from urgent care center for further evaluation. (EKG LBBB) Pt states, "I feel like something is stock in my chest and it hurts."

## 2019-07-03 NOTE — ED ADULT NURSE NOTE - NSIMPLEMENTINTERV_GEN_ALL_ED
Implemented All Fall with Harm Risk Interventions:  [place] to call system. Call bell, personal items and telephone within reach. Instruct patient to call for assistance. Room bathroom lighting operational. Non-slip footwear when patient is off stretcher. Physically safe environment: no spills, clutter or unnecessary equipment. Stretcher in lowest position, wheels locked, appropriate side rails in place. Provide visual cue, wrist band, yellow gown, etc. Monitor gait and stability. Monitor for mental status changes and reorient to person, place, and time. Review medications for side effects contributing to fall risk. Reinforce activity limits and safety measures with patient and family. Provide visual clues: red socks.

## 2019-07-03 NOTE — ED ADULT NURSE NOTE - OBJECTIVE STATEMENT
82 y/o female brought in for chest discomfort x 3 days. patient states pain is midsternal only when swallowing. patient denies sharp pain to chest, palpitations, shortness of breath, abdominal pain, nausea, vomiting. patient able to swallow without choking but feels discomfort.

## 2019-07-03 NOTE — ED ADULT TRIAGE NOTE - CCCP TRG CHIEF CMPLNT
"BARIATRIC SURGERY FOLLOW-UP NOTE    CHIEF COMPLAINT:   Chief Complaint   Patient presents with   â¢ Surgical Followup     6th visit, last 7/26 and has lost about 7lbs, Journals on phone, brings today. has gone over a few times in carbs, has been trying to stay around 30gm, but takes ""reward days\"". 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 Using Ketone sticks-in Ketosis. â¢ Follow-up       HISTORY OF PRESENT ILLNESS:  patient is a 48year old male who returns today for medically supervised weight loss follow up  Visit # 6/7  yes brought food journal today which was reviewed  Doing very well adhering to low carb diet overall  7 pounds lost since last visit  Today: Body mass index is 54.93 kg/mÂ².    Exercise: doing little more at home with cleaning and lifting around the house   Alcohol: none  Smoking: never  Cholecystectomy: no  yes interested in moving forward with complete surgical evaluation  Been using ketone sticks and has been in ketosis multiple times   Patient was cleared by Cardiology 6/2018 for bariatric surgery   He has history of CABG x2 but has no ongoing issues of angina or SOB    Saw Allergy Dr Tony Rodriguez last week for persistent uncontrolled asthma and allergic rhinitis  recs for pretreatment with predisone and puffs 3 days prior to surgery  He is also getting approved for specialized medication Carlyon Ly) from Dr Tony Rodriguez for lungs         PAST MEDICAL HISTORY:  Past Medical History:   Diagnosis Date   â¢ Asthma    â¢ CAD (coronary artery disease)    â¢ Corn or callus    â¢ Eczema    â¢ HTN (hypertension)    â¢ Hyperlipoproteinemia    â¢ Insulin dependent diabetes mellitus type IA (CMS/[facility])    â¢ Insulin pump in place    â¢ Obesity    â¢ Pancreatitis    â¢ Retinal edema    â¢ Sleep apnea, obstructive    â¢ Type 2 diabetes mellitus with diabetic polyneuropathy, with long-term current use of insulin (CMS/[facility]) 3/7/2018       PAST SURGICAL HISTORY:  Past Surgical History:   Procedure Laterality Date   â¢ " Adenoidectomy     â¢ Coronary artery bypass graft  2015    x3   â¢ Tonsillectomy         MEDICATIONS:  Current Outpatient Prescriptions   Medication Sig Dispense Refill   â¢ olopatadine (PAZEO) 0.7 % ophthalmic solution Apply 1 drop to eye daily as needed (eye itching). One drop to each eye daily as needed 1 Bottle 12   â¢ albuterol 108 (90 Base) MCG/ACT inhaler Inhale 2 puffs into the lungs every 4 hours as needed for Shortness of Breath or Wheezing. 1 Inhaler 1   â¢ metFORMIN (GLUCOPHAGE) 500 MG tablet Take 500 mg by mouth daily before dinner. â¢ pantoprazole (PROTONIX) 40 MG tablet Take 1 tablet by mouth daily. 30 tablet 11   â¢ fluticasone (FLONASE) 50 MCG/ACT nasal spray Spray 1 spray in each nostril daily. 16 g 11   â¢ fluticasone-salmeterol (ADVAIR DISKUS) 500-50 MCG/DOSE inhaler Inhale 1 puff into the lungs two times daily. 60 each 5   â¢ urea (CARMOL 40) 40 % cream Apply to the affected areas under occlusion 4 consecutive nights weekly 85 g 5   â¢ triamcinolone (ARISTOCORT) 0.1 % ointment Apply to rash arms/legs, twice daily as needed, avoid face/neck; not for more than 2 weeks 240 g 5   â¢ albuterol-ipratropium 2.5 mg/0.5 mg (DUONEB) 0.5-2.5 (3) MG/3ML nebulizer solution Take 3 mLs by nebulization every 6 hours as needed for Wheezing. 360 mL 2   â¢ busPIRone (BUSPAR) 10 MG tablet Take 5 mg by mouth 2 times daily. â¢ Hypertonic Nasal Wash (SINUS RINSE BOTTLE KIT NA)      â¢ Loratadine 10 MG Cap Take 10 mg by mouth 2 times daily. 60 capsule 12   â¢ aspirin 81 MG tablet Take 81 mg by mouth daily. â¢ gabapentin (NEURONTIN) 300 MG capsule Take 300 mg by mouth 3 times daily. â¢ hydrochlorothiazide (HYDRODIURIL) 25 MG tablet Take 25 mg by mouth 2 times daily. â¢ buPROPion (WELLBUTRIN XL) 150 MG 24 hr tablet Take 150 mg by mouth daily. â¢ metoPROLOL (TOPROL-XL) 25 MG 24 hr tablet Take 25 mg by mouth daily. â¢ lisinopril (ZESTRIL) 20 MG tablet Take 20 mg by mouth daily.      â¢ atorvastatin (LIPITOR) 10 MG tablet Take 10 mg by mouth daily. â¢ HYDROcodone-acetaminophen (NORCO) 5-325 MG per tablet Take 1 tablet by mouth every 6 hours as needed for Pain. â¢ insulin lispro (HUMALOG CARTRIDGE) 100 UNIT/ML injection 150 daily to pump     â¢ montelukast (SINGULAIR) 10 MG tablet Take 1 tablet by mouth nightly. 30 tablet 6   â¢ mometasone (ASMANEX 120 METERED DOSES) 220 MCG/INH inhaler Inhale 2 puffs into the lungs 2 times daily. With colds x 2 weeks 1 Inhaler 3   â¢ nitroGLYcerin (NITROSTAT) 0.4 MG SL tablet Place 0.4 mg under the tongue every 5 minutes as needed for Chest pain. â¢ Spacer/Aero Chamber Mouthpiece Misc        No current facility-administered medications for this visit. ALLERGIES:  ALLERGIES:   Allergen Reactions   â¢ Food SHORTNESS OF BREATH     Per pt previously tested by Dr. Jv Mason with allergies to many different foods/ingredients. No records received to confirm allergies. â¢ Prozac Other (See Comments)     Pt states time release agent in prozac- shut down liver    â¢ Seasonal Other (See Comments)     Breathing problems       PAST FAMILY HISTORY:  Family History   Problem Relation Age of Onset   â¢ Adopted: Yes       PAST SOCIAL HISTORY:  Social History     Social History   â¢ Marital status:      Spouse name: N/A   â¢ Number of children: N/A   â¢ Years of education: N/A     Occupational History   â¢ Not on file. Social History Main Topics   â¢ Smoking status: Never Smoker   â¢ Smokeless tobacco: Never Used   â¢ Alcohol use Not on file   â¢ Drug use: Unknown   â¢ Sexual activity: Not on file     Other Topics Concern   â¢ Not on file     Social History Narrative   â¢ No narrative on file       REVIEW OF SYSTEMS:  Constitutional: There is no history of fevers or chills  Eyes: Patient denies blurred vision or photophobia  ENMT: No history of ear pain, tinnitus or epistaxis  Cardiovascular: Denies history of chest pain or palpitations  Respiratory:  There is no history of shortness of breath, "orthopnea or paroxysmal nocturnal dyspnea  Gastrointestinal: No history of abdominal pain, nausea or vomiting  Genitourinary: There is no history of dysuria or hematuria  Musculoskeletal: Denies history of new joint pain or swelling  Neurologic: No history of numbness, tingling or weakness  Skin: No history of skin rashes  Psychiatric: Denies anxiety, depression, agitation and dependencies  All other systems that were reviewed are negative. PHYSICAL EXAMINATION:  VITAL SIGNS:    Vitals:    09/06/18 1331   BP: 120/70   Pulse: 68   Weight: (!) 168.7 kg   Height: 5' 9"" (1.753 m)      GENERAL: This is a 48year old male in no acute distress. PSYCHIATRIC: He is awake, alert and oriented to time, place and person. Mood and affect are appropriate. HEAD: Appears to be atraumatic and normocephalic. EYES: Reveal no icterus, no scleral injection, no conjunctival pallor. Pupils   equal, round and reactive to light and accommodation. Extraocular movements appear to be intact. THROAT: Oropharyngeal exam reveals moist oral mucosa. There is no erythema, discharge or thrush. Dentition is good. NECK: Supple and symmetric. There is no jugular venous distention or thyromegaly. LUNGS: Reveals good effort and lungs are clear to auscultation   bilaterally. There are no wheezes or rhonchi. HEART: Reveals presence of first and second heart sounds. Regular rate and rhythm. No murmurs, rubs or gallops. ABDOMEN:  Normoactive bowel sounds. Obese. Soft and nontender. There is no rebound tenderness. There is no hepatosplenomegaly. EXTREMITIES: No clubbing, cyanosis or edema bilaterally. NEUROLOGIC: Cranial nerves II through XII appear grossly intact. Sensation is intact. SKIN: Appears unremarkable and no rashes are present.     Labs:  WBC (K/mcL)   Date Value   08/27/2018 8.4     RBC (mil/mcL)   Date Value   08/27/2018 4.69     HCT (%)   Date Value   08/27/2018 43.4     HGB (g/dL)   Date Value   08/27/2018 13.8     PLT (K/mcL) " chest pain Date Value   08/27/2018 211       No results found for: SODIUM  No results found for: POTASSIUM  No results found for: CHLORIDE  No results found for: GLUCOSE  No results found for: CALCIUM  No results found for: CO2  No results found for: BUN  No results found for: CREATININE    No results found for: AST  No results found for: GPT  No results found for: GGTP  No results found for: ALKPT  No results found for: BILIRUBIN    No results found for: INR    No results found for: TSH      Diagnostics:  CT: No results found for this or any previous visit. Assessment:    Clinical severe obesity with Body mass index is 54.93 kg/mÂ². Progressing well as has lost 7 pounds since last month, 22 pounds overall     I have reviewed the diet and have delivered feedback where carbs are impeding the ability to burn fat. Â    We reinforced the option of a low carb lifestyle and the importance of continuing to keep a food log. Â    Hydration and walking activity emphasized (again discussed pedometer). We also discussed the option of surgical weight loss and specifically discussed Gastric bypass and sleeve gastrectomy. Â       PROCEDURE BEING PLANNED AT THIS TIME: Bypass    Plan:    Return to clinic 1 month for medically supervised weight loss follow up  Cardiology clearance obtained   Will verify with his Endocrine provider insulin regimen immediately post op   Will also verify with Dr Bayron Arita possible immunologic effects of new drug for his asthma/lungs      Haley Calderon MD  9/6/2018 1:33 PM

## 2019-07-03 NOTE — ED PROVIDER NOTE - OBJECTIVE STATEMENT
82 y/o F w/ PMH of   presenting for a dull pain in the center of the chest that started 2 days ago while she was sitting on the couch. She took an ASA and an antacid. 84 y/o F w/ PMH of AAA, Brain aneurysm, CVA, poorly controlled DM, HLD, HTN, Hypothyroid, Panic disorder presenting for a dull pain in the center of the chest that started 2 days ago while she was sitting on the couch. She took an ASA and an antacid which didn't help. Pt states its a dull pressure like pain feeling like something is stuck there. The pain worsens with inspiration but nothing makes the pain better. Pt states that the pain isnt severe and she is able to tolerate it. She states that it doesn't move with swallowing and denies any dysphagia. Today it started to worry the pt so she went to the urgent care by her house. They did an EKG and found a LBBB and sent her to the ER. Prior EKG shows LBBB is old. Pt denies any SOB, palpitations, n/v/d, abdominal pains, headaches, weakness, new muscle pains, or joint pains.

## 2019-07-03 NOTE — ED PROVIDER NOTE - ATTENDING CONTRIBUTION TO CARE
84 yo f hx AAA, anxiety, DM, brain aneurysm, HLD, HTN, hypothyroid, depression, panic disorder  pt here for pinpoint chest pain x2 days. pt took asa and antacid w/o improvement. CP not severe, dull, constant, not radiating, pressure like.   pain worse w/ deep breathing  no cough/fever  on DAPT, no AC    vss  gen- NAD, aaox3  card-rrr  lungs-ctab, no wheezing or rhonchi  abd-sntnd, no guarding or rebound  neuro- full str/sensation, cn ii-xii grossly intact, normal coordination 82 yo f hx AAA, anxiety, DM, brain aneurysm, HLD, HTN, hypothyroid, depression, panic disorder  pt here for pinpoint chest pain x2 days. pt took asa and antacid w/o improvement. CP not severe, dull, constant, not radiating, pressure like.   no cough/fever  on DAPT, no AC    vss  gen- NAD, aaox3  card-rrr  lungs-ctab, no wheezing or rhonchi  abd-sntnd, no guarding or rebound  neuro- full str/sensation, cn ii-xii grossly intact, normal coordination  LE- b/l doplerable DP and PT pulses    pt w/ atypical cp, no recent stress test  possible GI  will get basic labs, trop, ekg, cxr  possible obs

## 2019-07-04 VITALS
TEMPERATURE: 97 F | HEART RATE: 62 BPM | DIASTOLIC BLOOD PRESSURE: 72 MMHG | RESPIRATION RATE: 18 BRPM | SYSTOLIC BLOOD PRESSURE: 145 MMHG | OXYGEN SATURATION: 95 %

## 2019-07-04 PROCEDURE — 93018 CV STRESS TEST I&R ONLY: CPT

## 2019-07-04 PROCEDURE — 93016 CV STRESS TEST SUPVJ ONLY: CPT

## 2019-07-04 PROCEDURE — 99217: CPT

## 2019-07-04 PROCEDURE — 78452 HT MUSCLE IMAGE SPECT MULT: CPT | Mod: 26

## 2019-07-04 RX ORDER — LEVOTHYROXINE SODIUM 125 MCG
112 TABLET ORAL DAILY
Refills: 0 | Status: DISCONTINUED | OUTPATIENT
Start: 2019-07-04 | End: 2019-07-04

## 2019-07-04 RX ORDER — CLOPIDOGREL BISULFATE 75 MG/1
75 TABLET, FILM COATED ORAL DAILY
Refills: 0 | Status: DISCONTINUED | OUTPATIENT
Start: 2019-07-04 | End: 2019-07-04

## 2019-07-04 RX ORDER — ASPIRIN/CALCIUM CARB/MAGNESIUM 324 MG
81 TABLET ORAL ONCE
Refills: 0 | Status: COMPLETED | OUTPATIENT
Start: 2019-07-04 | End: 2019-07-04

## 2019-07-04 RX ORDER — ADENOSINE 3 MG/ML
60 INJECTION INTRAVENOUS ONCE
Refills: 0 | Status: COMPLETED | OUTPATIENT
Start: 2019-07-04 | End: 2019-07-04

## 2019-07-04 RX ORDER — AMLODIPINE BESYLATE 2.5 MG/1
5 TABLET ORAL ONCE
Refills: 0 | Status: COMPLETED | OUTPATIENT
Start: 2019-07-04 | End: 2019-07-04

## 2019-07-04 RX ADMIN — Medication 81 MILLIGRAM(S): at 13:40

## 2019-07-04 RX ADMIN — ADENOSINE 600 MILLIGRAM(S): 3 INJECTION INTRAVENOUS at 12:39

## 2019-07-04 RX ADMIN — CLOPIDOGREL BISULFATE 75 MILLIGRAM(S): 75 TABLET, FILM COATED ORAL at 13:41

## 2019-07-04 RX ADMIN — AMLODIPINE BESYLATE 5 MILLIGRAM(S): 2.5 TABLET ORAL at 13:40

## 2019-07-04 NOTE — ED CDU PROVIDER DISPOSITION NOTE - CARE PROVIDER_API CALL
Divya Du)  EndocrinologyMetabDiabetes; Internal Medicine  [address] [place] [address]  Phone: [phone]  Fax: [phone]  Follow Up Time:     Barb Hernández)  Cardiovascular Disease; Internal Medicine; Interventional Cardiology; Nuclear Cardiology  [address] [place] [address]  Phone: [phone]  Fax: [phone]  Follow Up Time:

## 2019-07-04 NOTE — ED CDU PROVIDER DISPOSITION NOTE - CLINICAL COURSE
Placed into observation for chest pain. While in observation pt was on continuous cardiac monitoring. Serial cardiac markers neg. Stress test neg. Will need out pt GI and cardiology evaluation.

## 2019-07-04 NOTE — ED CDU PROVIDER DISPOSITION NOTE - NSFOLLOWUPINSTRUCTIONS_ED_ALL_ED_FT
Follow up with your primary care physician.     Chest Pain    Chest pain can be caused by many different conditions which may or may not be dangerous. Causes include heartburn, lung infections, heart attack, blood clot in lungs, skin infections, strain or damage to muscle, cartilage, or bones, etc. In addition to a history and physical examination, an electrocardiogram (ECG) or other lab tests may have been performed to determine the cause of your chest pain. Follow up with your primary care provider or with a cardiologist as instructed.     SEEK IMMEDIATE MEDICAL CARE IF YOU HAVE ANY OF THE FOLLOWING SYMPTOMS: worsening chest pain, coughing up blood, unexplained back/neck/jaw pain, severe abdominal pain, dizziness or lightheadedness, fainting, shortness of breath, sweaty or clammy skin, vomiting, or racing heart beat. These symptoms may represent a serious problem that is an emergency. Do not wait to see if the symptoms will go away. Get medical help right away. Call 911 and do not drive yourself to the hospital.

## 2019-07-04 NOTE — ED ADULT NURSE REASSESSMENT NOTE - NS ED NURSE REASSESS COMMENT FT1
Patient assessed VSS. Patient updated on plan of care understanding was verbalized. Patient states initial symptom of CP has significantly improved, and denies SOB N/V and weakness.
pt received from NICOLE Gudino, pt is aaox3, no apparent distress, no complaints at this time, teto and sent 2nd troponin, gave ativan 1mg as pt states she takes it to go to sleep everynight
pt sleeping, nad, respirations easy and regular, will continue to monitor
Patient placed on cardiac monitor hr 52 sinus bradycarida. patient asymptomatic denies sob, dizziness, headache, fatigue. IV placed. Daughter at bedside. Fall precautions maintained.

## 2019-07-04 NOTE — ED CDU PROVIDER SUBSEQUENT DAY NOTE - CONDUCTED A DETAILED DISCUSSION WITH PATIENT AND/OR GUARDIAN REGARDING, MDM
radiology results/GI/lab results/need for outpatient follow-up/return to ED if symptoms worsen, persist or questions arise

## 2019-08-17 ENCOUNTER — INPATIENT (INPATIENT)
Facility: HOSPITAL | Age: 84
LOS: 1 days | Discharge: HOME HEALTH | End: 2019-08-19
Attending: INTERNAL MEDICINE | Admitting: INTERNAL MEDICINE
Payer: MEDICARE

## 2019-08-17 VITALS
TEMPERATURE: 98 F | RESPIRATION RATE: 20 BRPM | WEIGHT: 179.9 LBS | HEART RATE: 85 BPM | SYSTOLIC BLOOD PRESSURE: 155 MMHG | DIASTOLIC BLOOD PRESSURE: 72 MMHG | OXYGEN SATURATION: 97 %

## 2019-08-17 DIAGNOSIS — Z98.49 CATARACT EXTRACTION STATUS, UNSPECIFIED EYE: Chronic | ICD-10-CM

## 2019-08-17 DIAGNOSIS — Z98.890 OTHER SPECIFIED POSTPROCEDURAL STATES: Chronic | ICD-10-CM

## 2019-08-17 LAB
ALBUMIN SERPL ELPH-MCNC: 4.1 G/DL — SIGNIFICANT CHANGE UP (ref 3.5–5.2)
ALP SERPL-CCNC: 130 U/L — HIGH (ref 30–115)
ALT FLD-CCNC: 6 U/L — SIGNIFICANT CHANGE UP (ref 0–41)
ANION GAP SERPL CALC-SCNC: 14 MMOL/L — SIGNIFICANT CHANGE UP (ref 7–14)
APTT BLD: 24.4 SEC — LOW (ref 27–39.2)
AST SERPL-CCNC: 11 U/L — SIGNIFICANT CHANGE UP (ref 0–41)
BASOPHILS # BLD AUTO: 0.09 K/UL — SIGNIFICANT CHANGE UP (ref 0–0.2)
BASOPHILS NFR BLD AUTO: 1 % — SIGNIFICANT CHANGE UP (ref 0–1)
BILIRUB SERPL-MCNC: <0.2 MG/DL — SIGNIFICANT CHANGE UP (ref 0.2–1.2)
BLD GP AB SCN SERPL QL: SIGNIFICANT CHANGE UP
BUN SERPL-MCNC: 13 MG/DL — SIGNIFICANT CHANGE UP (ref 10–20)
CALCIUM SERPL-MCNC: 9.1 MG/DL — SIGNIFICANT CHANGE UP (ref 8.5–10.1)
CHLORIDE SERPL-SCNC: 94 MMOL/L — LOW (ref 98–110)
CO2 SERPL-SCNC: 25 MMOL/L — SIGNIFICANT CHANGE UP (ref 17–32)
CREAT SERPL-MCNC: 0.8 MG/DL — SIGNIFICANT CHANGE UP (ref 0.7–1.5)
EOSINOPHIL # BLD AUTO: 0.39 K/UL — SIGNIFICANT CHANGE UP (ref 0–0.7)
EOSINOPHIL NFR BLD AUTO: 4.5 % — SIGNIFICANT CHANGE UP (ref 0–8)
GLUCOSE SERPL-MCNC: 286 MG/DL — HIGH (ref 70–99)
HCT VFR BLD CALC: 35.1 % — LOW (ref 37–47)
HGB BLD-MCNC: 11.1 G/DL — LOW (ref 12–16)
IMM GRANULOCYTES NFR BLD AUTO: 0.3 % — SIGNIFICANT CHANGE UP (ref 0.1–0.3)
INR BLD: 1.02 RATIO — SIGNIFICANT CHANGE UP (ref 0.65–1.3)
LYMPHOCYTES # BLD AUTO: 1.91 K/UL — SIGNIFICANT CHANGE UP (ref 1.2–3.4)
LYMPHOCYTES # BLD AUTO: 21.9 % — SIGNIFICANT CHANGE UP (ref 20.5–51.1)
MCHC RBC-ENTMCNC: 24.8 PG — LOW (ref 27–31)
MCHC RBC-ENTMCNC: 31.6 G/DL — LOW (ref 32–37)
MCV RBC AUTO: 78.5 FL — LOW (ref 81–99)
MONOCYTES # BLD AUTO: 0.72 K/UL — HIGH (ref 0.1–0.6)
MONOCYTES NFR BLD AUTO: 8.3 % — SIGNIFICANT CHANGE UP (ref 1.7–9.3)
NEUTROPHILS # BLD AUTO: 5.57 K/UL — SIGNIFICANT CHANGE UP (ref 1.4–6.5)
NEUTROPHILS NFR BLD AUTO: 64 % — SIGNIFICANT CHANGE UP (ref 42.2–75.2)
NRBC # BLD: 0 /100 WBCS — SIGNIFICANT CHANGE UP (ref 0–0)
PLATELET # BLD AUTO: 274 K/UL — SIGNIFICANT CHANGE UP (ref 130–400)
POTASSIUM SERPL-MCNC: 4.4 MMOL/L — SIGNIFICANT CHANGE UP (ref 3.5–5)
POTASSIUM SERPL-SCNC: 4.4 MMOL/L — SIGNIFICANT CHANGE UP (ref 3.5–5)
PROT SERPL-MCNC: 7 G/DL — SIGNIFICANT CHANGE UP (ref 6–8)
PROTHROM AB SERPL-ACNC: 11.7 SEC — SIGNIFICANT CHANGE UP (ref 9.95–12.87)
RBC # BLD: 4.47 M/UL — SIGNIFICANT CHANGE UP (ref 4.2–5.4)
RBC # FLD: 14.6 % — HIGH (ref 11.5–14.5)
SODIUM SERPL-SCNC: 133 MMOL/L — LOW (ref 135–146)
WBC # BLD: 8.71 K/UL — SIGNIFICANT CHANGE UP (ref 4.8–10.8)
WBC # FLD AUTO: 8.71 K/UL — SIGNIFICANT CHANGE UP (ref 4.8–10.8)

## 2019-08-17 PROCEDURE — 99285 EMERGENCY DEPT VISIT HI MDM: CPT

## 2019-08-17 NOTE — ED ADULT NURSE NOTE - OBJECTIVE STATEMENT
Pt C/o hard stools for the past 2 days and stated that she and her daughter observed 3 or mar dark stools over the past 24 hrs. denies pain at this time no N/v noted

## 2019-08-17 NOTE — ED PROVIDER NOTE - CLINICAL SUMMARY MEDICAL DECISION MAKING FREE TEXT BOX
I have reviewed available current nursing and previous documentation of past medical, surgical, family, and/or social history.   patient remained stable in ED, improved well, results of the diagnostic studies reviewed and discussed with patient and her daughter in ED, discussed with Dr. Du and MAR, patient is admitted to medicine in stable condition.

## 2019-08-17 NOTE — ED PROVIDER NOTE - OBJECTIVE STATEMENT
84 yo f pmhx sig for DM, HTN, HLD, CAD on Plavix pw BRBPR for 7 d with every bowel movement. According to daughter at bedside pt had about a handful of BRBPR today was the worst episode. NO dark stools, no loc. Denies abd pain. No fevers, no chills.    I have reviewed available current nursing and previous documentation of past medical, surgical, family, and/or social history.

## 2019-08-17 NOTE — ED PROVIDER NOTE - PHYSICAL EXAMINATION
Physical Exam    Vital Signs: I have reviewed the initial vital signs.  Constitutional: well-nourished, appears stated age, no acute distress  Eyes: PERRLA, and symmetrical lids.  ENT: Neck supple with no adenopathy, moist MM.  Cardiovascular: regular rate, regular rhythm, well-perfused extremities  Respiratory: unlabored respiratory effort, clear to auscultation bilaterally  Gastrointestinal: soft, non-tender abdomen, FARTUN brown stools and external hemorrhoids  Musculoskeletal: supple neck, no lower extremity edema  Integumentary: warm, dry, no rash  Neurologic: awake, alert, extremities’ motor and sensory functions grossly intact  Psychiatric: A&Ox3, appropriate mood, appropriate affect normal...

## 2019-08-17 NOTE — ED PROVIDER NOTE - ATTENDING CONTRIBUTION TO CARE
Patient is c/o rectal bleeding x one week, denies trauma, no f/c/vomiting.   lungs: CTA, no crackles  abd: +BS, ND, soft  A/P: Lower GI bleeding  labs, reevaluation

## 2019-08-18 LAB
ALBUMIN SERPL ELPH-MCNC: 4.1 G/DL — SIGNIFICANT CHANGE UP (ref 3.5–5.2)
ALP SERPL-CCNC: 121 U/L — HIGH (ref 30–115)
ALT FLD-CCNC: 7 U/L — SIGNIFICANT CHANGE UP (ref 0–41)
ANION GAP SERPL CALC-SCNC: 13 MMOL/L — SIGNIFICANT CHANGE UP (ref 7–14)
APTT BLD: 31.2 SEC — SIGNIFICANT CHANGE UP (ref 27–39.2)
AST SERPL-CCNC: 13 U/L — SIGNIFICANT CHANGE UP (ref 0–41)
BASOPHILS # BLD AUTO: 0.09 K/UL — SIGNIFICANT CHANGE UP (ref 0–0.2)
BASOPHILS NFR BLD AUTO: 1.2 % — HIGH (ref 0–1)
BILIRUB SERPL-MCNC: 0.3 MG/DL — SIGNIFICANT CHANGE UP (ref 0.2–1.2)
BUN SERPL-MCNC: 12 MG/DL — SIGNIFICANT CHANGE UP (ref 10–20)
CALCIUM SERPL-MCNC: 9.2 MG/DL — SIGNIFICANT CHANGE UP (ref 8.5–10.1)
CHLORIDE SERPL-SCNC: 98 MMOL/L — SIGNIFICANT CHANGE UP (ref 98–110)
CO2 SERPL-SCNC: 27 MMOL/L — SIGNIFICANT CHANGE UP (ref 17–32)
CREAT SERPL-MCNC: 0.8 MG/DL — SIGNIFICANT CHANGE UP (ref 0.7–1.5)
EOSINOPHIL # BLD AUTO: 0.36 K/UL — SIGNIFICANT CHANGE UP (ref 0–0.7)
EOSINOPHIL NFR BLD AUTO: 4.7 % — SIGNIFICANT CHANGE UP (ref 0–8)
GLUCOSE BLDC GLUCOMTR-MCNC: 185 MG/DL — HIGH (ref 70–99)
GLUCOSE BLDC GLUCOMTR-MCNC: 201 MG/DL — HIGH (ref 70–99)
GLUCOSE BLDC GLUCOMTR-MCNC: 254 MG/DL — HIGH (ref 70–99)
GLUCOSE BLDC GLUCOMTR-MCNC: 342 MG/DL — HIGH (ref 70–99)
GLUCOSE SERPL-MCNC: 219 MG/DL — HIGH (ref 70–99)
HCT VFR BLD CALC: 35.1 % — LOW (ref 37–47)
HGB BLD-MCNC: 10.8 G/DL — LOW (ref 12–16)
IMM GRANULOCYTES NFR BLD AUTO: 0.5 % — HIGH (ref 0.1–0.3)
INR BLD: 1.05 RATIO — SIGNIFICANT CHANGE UP (ref 0.65–1.3)
LYMPHOCYTES # BLD AUTO: 1.7 K/UL — SIGNIFICANT CHANGE UP (ref 1.2–3.4)
LYMPHOCYTES # BLD AUTO: 22.1 % — SIGNIFICANT CHANGE UP (ref 20.5–51.1)
MAGNESIUM SERPL-MCNC: 2 MG/DL — SIGNIFICANT CHANGE UP (ref 1.8–2.4)
MCHC RBC-ENTMCNC: 24.3 PG — LOW (ref 27–31)
MCHC RBC-ENTMCNC: 30.8 G/DL — LOW (ref 32–37)
MCV RBC AUTO: 78.9 FL — LOW (ref 81–99)
MONOCYTES # BLD AUTO: 0.7 K/UL — HIGH (ref 0.1–0.6)
MONOCYTES NFR BLD AUTO: 9.1 % — SIGNIFICANT CHANGE UP (ref 1.7–9.3)
NEUTROPHILS # BLD AUTO: 4.8 K/UL — SIGNIFICANT CHANGE UP (ref 1.4–6.5)
NEUTROPHILS NFR BLD AUTO: 62.4 % — SIGNIFICANT CHANGE UP (ref 42.2–75.2)
NRBC # BLD: 0 /100 WBCS — SIGNIFICANT CHANGE UP (ref 0–0)
PLATELET # BLD AUTO: 249 K/UL — SIGNIFICANT CHANGE UP (ref 130–400)
POTASSIUM SERPL-MCNC: 4.6 MMOL/L — SIGNIFICANT CHANGE UP (ref 3.5–5)
POTASSIUM SERPL-SCNC: 4.6 MMOL/L — SIGNIFICANT CHANGE UP (ref 3.5–5)
PROT SERPL-MCNC: 6.7 G/DL — SIGNIFICANT CHANGE UP (ref 6–8)
PROTHROM AB SERPL-ACNC: 12.1 SEC — SIGNIFICANT CHANGE UP (ref 9.95–12.87)
RBC # BLD: 4.45 M/UL — SIGNIFICANT CHANGE UP (ref 4.2–5.4)
RBC # FLD: 14.8 % — HIGH (ref 11.5–14.5)
SODIUM SERPL-SCNC: 138 MMOL/L — SIGNIFICANT CHANGE UP (ref 135–146)
WBC # BLD: 7.69 K/UL — SIGNIFICANT CHANGE UP (ref 4.8–10.8)
WBC # FLD AUTO: 7.69 K/UL — SIGNIFICANT CHANGE UP (ref 4.8–10.8)

## 2019-08-18 PROCEDURE — 74018 RADEX ABDOMEN 1 VIEW: CPT | Mod: 26

## 2019-08-18 RX ORDER — AMLODIPINE BESYLATE 2.5 MG/1
5 TABLET ORAL DAILY
Refills: 0 | Status: DISCONTINUED | OUTPATIENT
Start: 2019-08-18 | End: 2019-08-19

## 2019-08-18 RX ORDER — INSULIN DETEMIR 100/ML (3)
34 INSULIN PEN (ML) SUBCUTANEOUS AT BEDTIME
Refills: 0 | Status: DISCONTINUED | OUTPATIENT
Start: 2019-08-18 | End: 2019-08-18

## 2019-08-18 RX ORDER — MECLIZINE HCL 12.5 MG
12.5 TABLET ORAL DAILY
Refills: 0 | Status: DISCONTINUED | OUTPATIENT
Start: 2019-08-18 | End: 2019-08-19

## 2019-08-18 RX ORDER — SPIRONOLACTONE 25 MG/1
25 TABLET, FILM COATED ORAL DAILY
Refills: 0 | Status: DISCONTINUED | OUTPATIENT
Start: 2019-08-18 | End: 2019-08-19

## 2019-08-18 RX ORDER — SIMVASTATIN 20 MG/1
40 TABLET, FILM COATED ORAL AT BEDTIME
Refills: 0 | Status: DISCONTINUED | OUTPATIENT
Start: 2019-08-18 | End: 2019-08-19

## 2019-08-18 RX ORDER — METOPROLOL TARTRATE 50 MG
25 TABLET ORAL
Refills: 0 | Status: DISCONTINUED | OUTPATIENT
Start: 2019-08-18 | End: 2019-08-19

## 2019-08-18 RX ORDER — DOCUSATE SODIUM 100 MG
100 CAPSULE ORAL DAILY
Refills: 0 | Status: DISCONTINUED | OUTPATIENT
Start: 2019-08-18 | End: 2019-08-19

## 2019-08-18 RX ORDER — GABAPENTIN 400 MG/1
1 CAPSULE ORAL
Qty: 0 | Refills: 0 | DISCHARGE

## 2019-08-18 RX ORDER — INSULIN GLARGINE 100 [IU]/ML
34 INJECTION, SOLUTION SUBCUTANEOUS AT BEDTIME
Refills: 0 | Status: DISCONTINUED | OUTPATIENT
Start: 2019-08-18 | End: 2019-08-19

## 2019-08-18 RX ORDER — GABAPENTIN 400 MG/1
300 CAPSULE ORAL THREE TIMES A DAY
Refills: 0 | Status: DISCONTINUED | OUTPATIENT
Start: 2019-08-18 | End: 2019-08-19

## 2019-08-18 RX ORDER — LEVOTHYROXINE SODIUM 125 MCG
112 TABLET ORAL DAILY
Refills: 0 | Status: DISCONTINUED | OUTPATIENT
Start: 2019-08-18 | End: 2019-08-19

## 2019-08-18 RX ORDER — SENNA PLUS 8.6 MG/1
2 TABLET ORAL AT BEDTIME
Refills: 0 | Status: DISCONTINUED | OUTPATIENT
Start: 2019-08-18 | End: 2019-08-19

## 2019-08-18 RX ORDER — INSULIN LISPRO 100/ML
12 VIAL (ML) SUBCUTANEOUS
Refills: 0 | Status: DISCONTINUED | OUTPATIENT
Start: 2019-08-18 | End: 2019-08-19

## 2019-08-18 RX ORDER — ASPIRIN/CALCIUM CARB/MAGNESIUM 324 MG
81 TABLET ORAL DAILY
Refills: 0 | Status: DISCONTINUED | OUTPATIENT
Start: 2019-08-18 | End: 2019-08-19

## 2019-08-18 RX ORDER — METOPROLOL TARTRATE 50 MG
1 TABLET ORAL
Qty: 0 | Refills: 0 | DISCHARGE

## 2019-08-18 RX ORDER — PANTOPRAZOLE SODIUM 20 MG/1
40 TABLET, DELAYED RELEASE ORAL
Refills: 0 | Status: DISCONTINUED | OUTPATIENT
Start: 2019-08-18 | End: 2019-08-19

## 2019-08-18 RX ADMIN — GABAPENTIN 300 MILLIGRAM(S): 400 CAPSULE ORAL at 21:08

## 2019-08-18 RX ADMIN — SENNA PLUS 2 TABLET(S): 8.6 TABLET ORAL at 21:08

## 2019-08-18 RX ADMIN — INSULIN GLARGINE 34 UNIT(S): 100 INJECTION, SOLUTION SUBCUTANEOUS at 21:07

## 2019-08-18 RX ADMIN — SIMVASTATIN 40 MILLIGRAM(S): 20 TABLET, FILM COATED ORAL at 21:08

## 2019-08-18 RX ADMIN — AMLODIPINE BESYLATE 5 MILLIGRAM(S): 2.5 TABLET ORAL at 12:21

## 2019-08-18 RX ADMIN — Medication 25 MILLIGRAM(S): at 17:19

## 2019-08-18 RX ADMIN — Medication 81 MILLIGRAM(S): at 12:16

## 2019-08-18 RX ADMIN — SPIRONOLACTONE 25 MILLIGRAM(S): 25 TABLET, FILM COATED ORAL at 12:21

## 2019-08-18 RX ADMIN — Medication 1 MILLIGRAM(S): at 20:13

## 2019-08-18 RX ADMIN — Medication 12.5 MILLIGRAM(S): at 12:16

## 2019-08-18 RX ADMIN — Medication 12 UNIT(S): at 17:11

## 2019-08-18 RX ADMIN — Medication 30 MILLIGRAM(S): at 12:17

## 2019-08-18 RX ADMIN — Medication 100 MILLIGRAM(S): at 17:15

## 2019-08-18 NOTE — CONSULT NOTE ADULT - SUBJECTIVE AND OBJECTIVE BOX
GI HPI:  Patient is a 83y old  Female who presents with a chief complaint of Rectal Bleeding (18 Aug 2019 11:45)  84 yo F with PMH of DM, HTN, HLD, Aortic aneurysm, brain aneurysm , CVA, now on ASA and plavix (unclear reason)   pw BRBPR for 7 d with bowel movement. It is fresh red blood mixed with stool, sometimes without the stool. 1 cupful to 1 teaspoon, according to the patient has been recently constipated due to decrease ambulation after ankle injury 6 months ago, did a manual disimpaction 2 days ago and bled after that and that led the bleeding worse. She does not have any abdominal pain, nausea or vomiting.   Denies any fever or any other symptoms.    In the ED, FARTUN was done which was negative for any blood.       PAST MEDICAL & SURGICAL HISTORY  Vertigo  Hypothyroid  Brain aneurysm  AAA (abdominal aortic aneurysm)  HLD (hyperlipidemia)  HTN (hypertension)  Depression  Anxiety  Panic disorder  DM (diabetes mellitus)  CVA (cerebral vascular accident)  H/O: hysterectomy  Status post cataract surgery      FAMILY HISTORY:  FAMILY HISTORY:  Family history of breast cancer (Sibling)  Family history of prostate cancer in father (Father)      SOCIAL HISTORY:  smoker: denies   Alcohol: denies   Drug: denies     ALLERGIES:  codeine (Nausea)  IV Contrast (Anaphylaxis)  penicillin (Hives)  shellfish (Unknown)      MEDICATIONS:  MEDICATIONS  (STANDING):  amLODIPine   Tablet 5 milliGRAM(s) Oral daily  aspirin enteric coated 81 milliGRAM(s) Oral daily  docusate sodium 100 milliGRAM(s) Oral daily  gabapentin 300 milliGRAM(s) Oral three times a day  insulin glargine Injectable (LANTUS) 34 Unit(s) SubCutaneous at bedtime  insulin lispro Injectable (HumaLOG) 12 Unit(s) SubCutaneous three times a day with meals  levothyroxine 112 MICROGram(s) Oral daily  meclizine 12.5 milliGRAM(s) Oral daily  metoprolol tartrate 25 milliGRAM(s) Oral two times a day  pantoprazole    Tablet 40 milliGRAM(s) Oral before breakfast  PARoxetine 30 milliGRAM(s) Oral daily  senna 2 Tablet(s) Oral at bedtime  simvastatin 40 milliGRAM(s) Oral at bedtime  spironolactone 25 milliGRAM(s) Oral daily    MEDICATIONS  (PRN):  LORazepam     Tablet 1 milliGRAM(s) Oral two times a day PRN Anxiety      HOME MEDICATIONS:  Home Medications:  amLODIPine 5 mg oral tablet: 1 tab(s) orally once a day (10 Mar 2018 17:59)  aspirin 81 mg oral delayed release tablet: 1 tab(s) orally once a day (28 Mar 2019 01:45)  gabapentin 300 mg oral tablet: 1 cap(s) orally 3 times a day (18 Aug 2019 14:09)  HumaLOG 100 units/mL subcutaneous solution: with meals 12 units (10 Mar 2018 17:59)  Levemir 100 units/mL subcutaneous solution: 34 unit(s) subcutaneous once a day (at bedtime) (10 Mar 2018 17:59)  levothyroxine 112 mcg (0.112 mg) oral tablet: 1 tab(s) orally once a day (10 Mar 2018 17:59)  LORazepam 1 mg oral tablet: 1 tab(s) orally 2 times a day (18 Aug 2019 14:06)  meclizine 12.5 mg oral tablet: 1 tab(s) orally once a day (10 Mar 2018 17:59)  Metoprolol Tartrate 25 mg oral tablet: 1 tab(s) orally 2 times a day (18 Aug 2019 14:08)  PARoxetine 40 mg oral tablet: 1 tab(s) orally once a day (10 Mar 2018 17:59)  Plavix 75 mg oral tablet: 1 tab(s) orally once a day (10 Mar 2018 17:59)  simvastatin 40 mg oral tablet: 1 tab(s) orally once a day (at bedtime) (10 Mar 2018 17:59)  spironolactone 25 mg oral tablet: orally once a day (10 Mar 2018 17:59)      ROS:     REVIEW OF SYSTEMS  General:  No fevers  Eyes:  No reported pain   ENT:  No sore throat   NECK: No stiffness   CV:  No chest pain   Resp:  No shortness of breath  GI:  See HPI  :  No dysuria  Muscle:  No weakness  Neuro:  No tingling  Endocrine:  No polyuria  Heme:  No ecchymosis          VITALS:   T(F): 97.6 (08-18 @ 14:30), Max: 98.5 (08-18 @ 06:47)  HR: 80 (08-18 @ 14:30) (80 - 85)  BP: 127/73 (08-18 @ 14:30) (127/73 - 155/72)  BP(mean): --  RR: 18 (08-18 @ 14:30) (18 - 20)  SpO2: 94% (08-18 @ 06:47) (94% - 97%)    I&O's Summary      PHYSICAL EXAM:  Gen: Comfortable   EYES: No scleral icterus   LUNG: Clear to auscultation bilaterally;  HEART: s1 and s2 heard   ABDOMEN: Soft, +BS, no abd distension , no  Abdominal Tenderness, No guarding, No Interiano Sign   Neuro: AAox  3  Ext: no edema     LABS:                        10.8   7.69  )-----------( 249      ( 18 Aug 2019 11:13 )             35.1     PT/INR - ( 18 Aug 2019 11:13 )  INR: 1.05          PTT - ( 18 Aug 2019 11:13 )  PTT:31.2   LIVER FUNCTIONS - ( 18 Aug 2019 11:13 )  Alb: 4.1 g/dL / Pro: 6.7 g/dL / ALK PHOS: 121 U/L / ALT: 7 U/L / AST: 13 U/L / GGT: x           08-18    138  |  98  |  12  ----------------------------<  219<H>  4.6   |  27  |  0.8    Ca    9.2      18 Aug 2019 11:13  Mg     2.0     08-18      Previous EGD: none on file     Previous colonoscopy: 2007, good prep, SC and DC diverticulosis, large hemorrhoids    RADIOLOGY:    < from: CT Abdomen and Pelvis No Cont (05.20.19 @ 12:18) >    IMPRESSION:     No acute traumatic abnormality within the chest abdomen and pelvis.    Stable 2.4 x 3.0 cm left adnexal cystic lesion, unchanged since   6/26/2017. Recommend a follow-up pelvic ultrasound in 6months.    Stable infrarenal abdominal aortic aneurysm measuring 3.9 cm.    < end of copied text >

## 2019-08-18 NOTE — CONSULT NOTE ADULT - ASSESSMENT
84 yo F with PMH of DM, HTN, HLD, Aortic aneurysm, brain aneurysm , CVA, now on ASA and plavix (unclear reason)     presented with painless BRBPR for 7 d with bowel movement associated with constipation   Last colonoscopy: 2007, good prep, SC and DC diverticulosis, large hemorrhoids  Vital signs are stable   Hb stable at 10.8    #) Stable lower GI  bleed - ? resolved   Associated with dual antiplatelets   Possible sec to hemorrhoids vs diverticulosis vs others including malignancy   Rec   Clear liquid diet for now   monitor cbc and transfuse to keep hb > 8   Hold plavix for now   follow up with primary for indication for ASA and plavix ,   if pt doesnot need to be on DAPT and no further bleeding and Hb stable can consider outpatient colonoscopy   If pt needs to be on DAPT or pt continues to have bleeding then will consider inpatient colonoscopy 84 yo F with PMH of DM, HTN, HLD, Aortic aneurysm, brain aneurysm , CVA, now on ASA and plavix (unclear reason)     presented with painless BRBPR for 7 d with bowel movement associated with constipation   Last colonoscopy: 2007, good prep, SC and DC diverticulosis, large hemorrhoids  Vital signs are stable   Hb stable at 10.8    #) Stable lower GI  bleed - ? resolved   Associated with dual antiplatelets   Possible sec to hemorrhoids vs diverticulosis vs others including malignancy   Rec   Clear liquid diet for now   monitor cbc and transfuse to keep hb > 8   Hold plavix for now   follow up with primary for indication for ASA and plavix ,   if pt doesnot need to be on DAPT and no further bleeding and Hb stable can consider outpatient colonoscopy   If pt needs to be on DAPT or pt continues to have bleeding then will consider inpatient colonoscopy after holding plavix for 5 days

## 2019-08-18 NOTE — H&P ADULT - HISTORY OF PRESENT ILLNESS
82 yo f pmhx sig for DM, HTN, HLD, CAD on Plavix pw BRBPR for 7 d with every bowel movement. According to daughter at bedside pt had about a handful of BRBPR today was the worst episode. NO dark stools, no loc. Denies abd pain. No fevers, no chills. 82 yo F with PMH of DM, HTN, HLD, Aortic aneurysm, brain aneurysm on Aspirin and Plavix (Unclear exact reason) pw BRBPR for 7 d with bowel movement. It is fresh red blood mixed with stool, sometimes without the stool. 1 cupful to 1 teaspoon, according to the patient has been recently constipated due to decrease ambulation after ankle injury 6 months ago, did a manual disimpaction 2 days ago and bled after that and that led the bleeding worse. She does not have any abdominal pain, nausea or vomiting.   Denies any fever or any other symptoms.    In the ED, FARTUN was done which was negative for any blood.     On my examination, she is comfortable not in any acute distress, AAO 3

## 2019-08-18 NOTE — H&P ADULT - NSHPPHYSICALEXAM_GEN_ALL_CORE
Constitutional: well-nourished, appears stated age, no acute distress  Eyes: PERRLA, and symmetrical lids.  ENT: Neck supple with no adenopathy, moist MM.  Cardiovascular: regular rate, regular rhythm, well-perfused extremities  Respiratory: unlabored respiratory effort, clear to auscultation bilaterally  Gastrointestinal: soft, non-tender abdomen, FARTUN brown stools and external hemorrhoids (Done in ED)  Musculoskeletal: supple neck, no lower extremity edema  Integumentary: warm, dry, no rash  Neurologic: awake, alert, extremities’ motor and sensory functions grossly intact  Psychiatric: A&Ox3, appropriate mood, appropriate affec

## 2019-08-18 NOTE — H&P ADULT - ASSESSMENT
84 yo f pmhx sig for DM, HTN, HLD, CAD on Plavix PW BRBPR for 7 d with every bowel movement.    Hematochezia  -Since last 7 days with almost every bowel movement  -She is on aspirin and plavix.  -Will have to hold plavix for 5 days before doing colonoscopy.  -Hb is stable  -GI consult    DM  -Insulin glargine and lispro home dose    HTN  -On amlodipine    CAD  -Was on aspirin and plavix  -Will hold plavix for 5 days to prepare for colonoscopy    Diet-Clear Liquid  Activity - Increase as tolerated  DVT - Sequential  GI - PPI  FULL CODE 82 yo f pmhx sig for DM, HTN, HLD, CAD on Plavix PW BRBPR for 7 d with every bowel movement.    Hematochezia  -Since last 7 days with bowel movement  -Fresh red sometimes mixed with stool  -Getting worse after manual disimpaction done by herself 2 days ago  -Hb is stable  -GI consult  -Talked with GI doctor      DM  -Insulin glargine and lispro home dose    HTN  -On amlodipine    CAD  -Was on aspirin and plavix  -Will hold plavix for 5 days to prepare for colonoscopy    Diet-Clear Liquid  Activity - Increase as tolerated  DVT - Sequential  GI - PPI  FULL CODE 82 yo f pmhx sig for DM, HTN, HLD, CAD on Plavix PW BRBPR for 7 d with every bowel movement.    Hematochezia  -Since last 7 days with bowel movement  -Fresh red sometimes mixed with stool  -Getting worse after manual disimpaction done by herself 2 days ago  -Hb is stable  -GI consult  -Talked with GI doctor Dr. Ontiveros thinks the bleeding is due to external hemorrhoids and severe constipation  -Will start soft diet, patient is hemodynamically stable.  -Can advance the diet if stays stable tomorrow  -Will hold Plavix according to him, if blood count stays stable can restart tomorrow    Constipation  -According to GI the cause of GI bleed  -IF cbc improves will do colonoscopy outpatient.  -Family requesting to get it done inpatient.  -Started on senna, colace and tap water enema during night daily according to GI    DM  -Insulin glargine and lispro home dose    HTN  -On amlodipine and metoprolol    DLD  -On simvastatin    Severe Anxiety  -On paroxetine and lorazepam PRN    Aortic and brain aneurysm  -Was on aspirin and plavix???  -Will hold Plavix     Diet- Soft   Activity - Increase as tolerated  DVT - Sequential  GI - PPI  FULL CODE

## 2019-08-19 ENCOUNTER — TRANSCRIPTION ENCOUNTER (OUTPATIENT)
Age: 84
End: 2019-08-19

## 2019-08-19 VITALS
HEART RATE: 66 BPM | SYSTOLIC BLOOD PRESSURE: 123 MMHG | DIASTOLIC BLOOD PRESSURE: 60 MMHG | RESPIRATION RATE: 18 BRPM | TEMPERATURE: 96 F

## 2019-08-19 LAB
ALBUMIN SERPL ELPH-MCNC: 3.8 G/DL — SIGNIFICANT CHANGE UP (ref 3.5–5.2)
ALP SERPL-CCNC: 112 U/L — SIGNIFICANT CHANGE UP (ref 30–115)
ALT FLD-CCNC: 6 U/L — SIGNIFICANT CHANGE UP (ref 0–41)
ANION GAP SERPL CALC-SCNC: 14 MMOL/L — SIGNIFICANT CHANGE UP (ref 7–14)
AST SERPL-CCNC: 10 U/L — SIGNIFICANT CHANGE UP (ref 0–41)
BILIRUB SERPL-MCNC: 0.3 MG/DL — SIGNIFICANT CHANGE UP (ref 0.2–1.2)
BUN SERPL-MCNC: 17 MG/DL — SIGNIFICANT CHANGE UP (ref 10–20)
CALCIUM SERPL-MCNC: 9.1 MG/DL — SIGNIFICANT CHANGE UP (ref 8.5–10.1)
CHLORIDE SERPL-SCNC: 99 MMOL/L — SIGNIFICANT CHANGE UP (ref 98–110)
CO2 SERPL-SCNC: 23 MMOL/L — SIGNIFICANT CHANGE UP (ref 17–32)
CREAT SERPL-MCNC: 0.9 MG/DL — SIGNIFICANT CHANGE UP (ref 0.7–1.5)
GLUCOSE BLDC GLUCOMTR-MCNC: 203 MG/DL — HIGH (ref 70–99)
GLUCOSE BLDC GLUCOMTR-MCNC: 268 MG/DL — HIGH (ref 70–99)
GLUCOSE BLDC GLUCOMTR-MCNC: 368 MG/DL — HIGH (ref 70–99)
GLUCOSE BLDC GLUCOMTR-MCNC: 403 MG/DL — HIGH (ref 70–99)
GLUCOSE SERPL-MCNC: 276 MG/DL — HIGH (ref 70–99)
HCT VFR BLD CALC: 34.7 % — LOW (ref 37–47)
HGB BLD-MCNC: 10.8 G/DL — LOW (ref 12–16)
MCHC RBC-ENTMCNC: 24.7 PG — LOW (ref 27–31)
MCHC RBC-ENTMCNC: 31.1 G/DL — LOW (ref 32–37)
MCV RBC AUTO: 79.4 FL — LOW (ref 81–99)
NRBC # BLD: 0 /100 WBCS — SIGNIFICANT CHANGE UP (ref 0–0)
PLATELET # BLD AUTO: 289 K/UL — SIGNIFICANT CHANGE UP (ref 130–400)
POTASSIUM SERPL-MCNC: 4.5 MMOL/L — SIGNIFICANT CHANGE UP (ref 3.5–5)
POTASSIUM SERPL-SCNC: 4.5 MMOL/L — SIGNIFICANT CHANGE UP (ref 3.5–5)
PROT SERPL-MCNC: 6.4 G/DL — SIGNIFICANT CHANGE UP (ref 6–8)
RBC # BLD: 4.37 M/UL — SIGNIFICANT CHANGE UP (ref 4.2–5.4)
RBC # FLD: 14.7 % — HIGH (ref 11.5–14.5)
SODIUM SERPL-SCNC: 136 MMOL/L — SIGNIFICANT CHANGE UP (ref 135–146)
WBC # BLD: 8.77 K/UL — SIGNIFICANT CHANGE UP (ref 4.8–10.8)
WBC # FLD AUTO: 8.77 K/UL — SIGNIFICANT CHANGE UP (ref 4.8–10.8)

## 2019-08-19 PROCEDURE — 99232 SBSQ HOSP IP/OBS MODERATE 35: CPT

## 2019-08-19 RX ORDER — AMLODIPINE BESYLATE 2.5 MG/1
1 TABLET ORAL
Qty: 30 | Refills: 0
Start: 2019-08-19 | End: 2019-09-17

## 2019-08-19 RX ORDER — AMLODIPINE BESYLATE 2.5 MG/1
1 TABLET ORAL
Qty: 0 | Refills: 0 | DISCHARGE

## 2019-08-19 RX ORDER — SIMVASTATIN 20 MG/1
1 TABLET, FILM COATED ORAL
Qty: 0 | Refills: 0 | DISCHARGE

## 2019-08-19 RX ORDER — ATORVASTATIN CALCIUM 80 MG/1
1 TABLET, FILM COATED ORAL
Qty: 30 | Refills: 0
Start: 2019-08-19 | End: 2019-09-17

## 2019-08-19 RX ORDER — ATORVASTATIN CALCIUM 80 MG/1
20 TABLET, FILM COATED ORAL AT BEDTIME
Refills: 0 | Status: DISCONTINUED | OUTPATIENT
Start: 2019-08-19 | End: 2019-08-19

## 2019-08-19 RX ORDER — INSULIN LISPRO 100/ML
6 VIAL (ML) SUBCUTANEOUS ONCE
Refills: 0 | Status: COMPLETED | OUTPATIENT
Start: 2019-08-19 | End: 2019-08-19

## 2019-08-19 RX ORDER — CLOPIDOGREL BISULFATE 75 MG/1
1 TABLET, FILM COATED ORAL
Qty: 0 | Refills: 0 | DISCHARGE

## 2019-08-19 RX ORDER — POLYETHYLENE GLYCOL 3350 17 G/17G
17 POWDER, FOR SOLUTION ORAL
Qty: 510 | Refills: 0
Start: 2019-08-19 | End: 2019-09-17

## 2019-08-19 RX ADMIN — Medication 25 MILLIGRAM(S): at 17:13

## 2019-08-19 RX ADMIN — Medication 112 MICROGRAM(S): at 05:13

## 2019-08-19 RX ADMIN — Medication 30 MILLIGRAM(S): at 11:49

## 2019-08-19 RX ADMIN — GABAPENTIN 300 MILLIGRAM(S): 400 CAPSULE ORAL at 13:54

## 2019-08-19 RX ADMIN — SPIRONOLACTONE 25 MILLIGRAM(S): 25 TABLET, FILM COATED ORAL at 05:13

## 2019-08-19 RX ADMIN — Medication 1 MILLIGRAM(S): at 05:13

## 2019-08-19 RX ADMIN — GABAPENTIN 300 MILLIGRAM(S): 400 CAPSULE ORAL at 05:12

## 2019-08-19 RX ADMIN — PANTOPRAZOLE SODIUM 40 MILLIGRAM(S): 20 TABLET, DELAYED RELEASE ORAL at 05:13

## 2019-08-19 RX ADMIN — Medication 12 UNIT(S): at 08:35

## 2019-08-19 RX ADMIN — Medication 6 UNIT(S): at 16:00

## 2019-08-19 RX ADMIN — Medication 12 UNIT(S): at 11:48

## 2019-08-19 RX ADMIN — Medication 12 UNIT(S): at 17:14

## 2019-08-19 RX ADMIN — Medication 25 MILLIGRAM(S): at 05:13

## 2019-08-19 RX ADMIN — Medication 81 MILLIGRAM(S): at 11:49

## 2019-08-19 RX ADMIN — AMLODIPINE BESYLATE 5 MILLIGRAM(S): 2.5 TABLET ORAL at 05:12

## 2019-08-19 RX ADMIN — Medication 100 MILLIGRAM(S): at 11:49

## 2019-08-19 RX ADMIN — Medication 12.5 MILLIGRAM(S): at 11:49

## 2019-08-19 RX ADMIN — Medication 1 MILLIGRAM(S): at 17:14

## 2019-08-19 NOTE — DISCHARGE NOTE NURSING/CASE MANAGEMENT/SOCIAL WORK - NSDCDPATPORTLINK_GEN_ALL_CORE
You can access the [facility], offered by [facility], by registering with the following website: [email or web address]

## 2019-08-19 NOTE — PROGRESS NOTE ADULT - SUBJECTIVE AND OBJECTIVE BOX
Hospital Day:  1d    Subjective:    Patient is a 83y old  Female who presents with a chief complaint of Rectal Bleeding (18 Aug 2019 19:06)    No overnight events. Seen and examined at bedside. Reported no acute complaints. Stated feeling fine with no blood per rectum.     Past Medical Hx:   Vertigo  Hypothyroid  Brain aneurysm  AAA (abdominal aortic aneurysm)  HLD (hyperlipidemia)  HTN (hypertension)  Depression  Anxiety  Panic disorder  DM (diabetes mellitus)  CVA (cerebral vascular accident)    Past Sx:  H/O: hysterectomy  Status post cataract surgery  No significant past surgical history    Allergies:  codeine (Nausea)  IV Contrast (Anaphylaxis)  penicillin (Hives)  shellfish (Unknown)    Current Meds:   Stand Meds:  amLODIPine   Tablet 5 milliGRAM(s) Oral daily  aspirin enteric coated 81 milliGRAM(s) Oral daily  atorvastatin 20 milliGRAM(s) Oral at bedtime  dextromethorphan 20 mG/quiNIDine sulfate 10 mG 1 Capsule(s) Oral every 12 hours  docusate sodium 100 milliGRAM(s) Oral daily  gabapentin 300 milliGRAM(s) Oral three times a day  insulin glargine Injectable (LANTUS) 34 Unit(s) SubCutaneous at bedtime  insulin lispro Injectable (HumaLOG) 12 Unit(s) SubCutaneous three times a day with meals  levothyroxine 112 MICROGram(s) Oral daily  LORazepam     Tablet 1 milliGRAM(s) Oral two times a day  meclizine 12.5 milliGRAM(s) Oral daily  metoprolol tartrate 25 milliGRAM(s) Oral two times a day  pantoprazole    Tablet 40 milliGRAM(s) Oral before breakfast  PARoxetine 30 milliGRAM(s) Oral daily  senna 2 Tablet(s) Oral at bedtime  spironolactone 25 milliGRAM(s) Oral daily    PRN Meds:    HOME MEDICATIONS:  amLODIPine 5 mg oral tablet: 1 tab(s) orally once a day  aspirin 81 mg oral delayed release tablet: 1 tab(s) orally once a day  gabapentin 300 mg oral tablet: 1 cap(s) orally 3 times a day  HumaLOG 100 units/mL subcutaneous solution: with meals 12 units  Levemir 100 units/mL subcutaneous solution: 34 unit(s) subcutaneous once a day (at bedtime)  levothyroxine 112 mcg (0.112 mg) oral tablet: 1 tab(s) orally once a day  LORazepam 1 mg oral tablet: 1 tab(s) orally 2 times a day  meclizine 12.5 mg oral tablet: 1 tab(s) orally once a day  Metoprolol Tartrate 25 mg oral tablet: 1 tab(s) orally 2 times a day  PARoxetine 40 mg oral tablet: 1 tab(s) orally once a day  Plavix 75 mg oral tablet: 1 tab(s) orally once a day  simvastatin 40 mg oral tablet: 1 tab(s) orally once a day (at bedtime)  spironolactone 25 mg oral tablet: orally once a day      Vital Signs:   T(F): 97.4 (08-19-19 @ 05:13), Max: 98.1 (08-18-19 @ 20:44)  HR: 71 (08-19-19 @ 05:13) (71 - 80)  BP: 175/76 (08-19-19 @ 05:13) (127/73 - 175/76)  RR: 18 (08-19-19 @ 05:13) (18 - 18)  SpO2: --    Physical Exam:   GENERAL: NAD, elderly appearing female, lying in bed, resting comfortably  HEENT: NCAT, PERRLA, EOMI  CHEST/LUNG: CTA, No wheezing, rhonchi, rales  HEART: Regular rate and rhythm; s1 s2 appreciated, No murmurs, rubs, or gallops  ABDOMEN: Soft, Nontender, Nondistended; Bowel sounds present  EXTREMITIES: No LE edema b/l, Peripheral pulses palpable, No cyanosis, no clubbing  NERVOUS SYSTEM:  Alert & Oriented X2, non focal    Labs:                         10.8   8.77  )-----------( 289      ( 19 Aug 2019 08:22 )             34.7     Neutophil% 62.4, Lymphocyte% 22.1, Monocyte% 9.1, Bands% 0.5 08-18-19 @ 11:13    19 Aug 2019 08:22    136    |  99     |  17     ----------------------------<  276    4.5     |  23     |  0.9      Ca    9.1        19 Aug 2019 08:22  Mg     2.0       18 Aug 2019 11:13    TPro  6.4    /  Alb  3.8    /  TBili  0.3    /  DBili  x      /  AST  10     /  ALT  6      /  AlkPhos  112    19 Aug 2019 08:22       pTT    31.2             ----< 1.05 INR  (08-18-19 @ 11:13)    12.10        PT    Radiology:   None Today    Assessment and Plan:   This is an 83 year old Female with PMHx of Type 2 DM, Hypertension, Hyperlipidemia, AAA, Brain Aneurysm and history fo Cerebral infarct 6 years ago who presented with reported history of BRBPR for 7 days.    #Hematochezia  - FARTUN in the ED negative for any blood  - Noted red blood mixed with stool  - Hemoglobin held stable  - GI consult; patient no longer needs to be on Plavix, can follow up outpatient with GI doctor   - Continue on PO diet   - Continue on Aspirin    #Constipation  - Likely the underlying cause of the bleeding (suspected hemorrhoids)   - Continue on Senna and Colace    #Type 2 DM  - Continue on Lantus/Humalog regimen that patient takes at home: Lantus 34, Humalog 12/12/12  POCT Blood Glucose.: 268 mg/dL (19 Aug 2019 07:59)  POCT Blood Glucose.: 254 mg/dL (18 Aug 2019 21:00)  POCT Blood Glucose.: 342 mg/dL (18 Aug 2019 16:40)  - Will change diet to carb consistent    #Hypertension  - Continue on Amlodipine and Metoprolol    #Hyperlipidemia  - Atorvastatin 20mg started in place of simvastatin due to medication interaction with Amlodipine     #Anxiety: Continue on Paroxetine and Lorazepam PRN    #History of Cerebral infarct with reported history of AAA and brain aneurysm.   - Spoke with Dr. Du; patient does not have any indication to be on Plavix any more at this time, can discharge home on ASA 81 and follow up with GI as an outpatient.     Activity: As tolerated  Diet: DASH; carb consistent  DVT ppx: Lovenox  GI ppx: Protonix  Code Status: Full Code  DISPO: From home; discharge home today      FINAL DISCHARGE INTERVIEW:  Resident(s) Present: Abhishek Baptiste RN Present: Viv    DISCHARGE MEDICATION RECONCILIATION  reviewed with Attending Dr. Du    DISPOSITION:   [ x ] Home,    [  ] Home with Visiting Nursing Services,   [    ]  SNF/ NH,    [   ] Acute Rehab (4A),   [   ] Other (Specify:_________) Hospital Day:  1d    Subjective:    Patient is a 83y old  Female who presents with a chief complaint of Rectal Bleeding (18 Aug 2019 19:06)    No overnight events. Seen and examined at bedside. Reported no acute complaints. Stated feeling fine with no blood per rectum. Spoke with daughter at length on the phone. Advised her of plan for discharge. Daughter agreeable to discharge.    Past Medical Hx:   Vertigo  Hypothyroid  Brain aneurysm  AAA (abdominal aortic aneurysm)  HLD (hyperlipidemia)  HTN (hypertension)  Depression  Anxiety  Panic disorder  DM (diabetes mellitus)  CVA (cerebral vascular accident)    Past Sx:  H/O: hysterectomy  Status post cataract surgery  No significant past surgical history    Allergies:  codeine (Nausea)  IV Contrast (Anaphylaxis)  penicillin (Hives)  shellfish (Unknown)    Current Meds:   Standng Meds:  amLODIPine   Tablet 5 milliGRAM(s) Oral daily  aspirin enteric coated 81 milliGRAM(s) Oral daily  atorvastatin 20 milliGRAM(s) Oral at bedtime  dextromethorphan 20 mG/quiNIDine sulfate 10 mG 1 Capsule(s) Oral every 12 hours  docusate sodium 100 milliGRAM(s) Oral daily  gabapentin 300 milliGRAM(s) Oral three times a day  insulin glargine Injectable (LANTUS) 34 Unit(s) SubCutaneous at bedtime  insulin lispro Injectable (HumaLOG) 12 Unit(s) SubCutaneous three times a day with meals  levothyroxine 112 MICROGram(s) Oral daily  LORazepam     Tablet 1 milliGRAM(s) Oral two times a day  meclizine 12.5 milliGRAM(s) Oral daily  metoprolol tartrate 25 milliGRAM(s) Oral two times a day  pantoprazole    Tablet 40 milliGRAM(s) Oral before breakfast  PARoxetine 30 milliGRAM(s) Oral daily  senna 2 Tablet(s) Oral at bedtime  spironolactone 25 milliGRAM(s) Oral daily    PRN Meds:    HOME MEDICATIONS:  amLODIPine 5 mg oral tablet: 1 tab(s) orally once a day  aspirin 81 mg oral delayed release tablet: 1 tab(s) orally once a day  gabapentin 300 mg oral tablet: 1 cap(s) orally 3 times a day  HumaLOG 100 units/mL subcutaneous solution: with meals 12 units  Levemir 100 units/mL subcutaneous solution: 34 unit(s) subcutaneous once a day (at bedtime)  levothyroxine 112 mcg (0.112 mg) oral tablet: 1 tab(s) orally once a day  LORazepam 1 mg oral tablet: 1 tab(s) orally 2 times a day  meclizine 12.5 mg oral tablet: 1 tab(s) orally once a day  Metoprolol Tartrate 25 mg oral tablet: 1 tab(s) orally 2 times a day  PARoxetine 40 mg oral tablet: 1 tab(s) orally once a day  Plavix 75 mg oral tablet: 1 tab(s) orally once a day  simvastatin 40 mg oral tablet: 1 tab(s) orally once a day (at bedtime)  spironolactone 25 mg oral tablet: orally once a day      Vital Signs:   T(F): 97.4 (08-19-19 @ 05:13), Max: 98.1 (08-18-19 @ 20:44)  HR: 71 (08-19-19 @ 05:13) (71 - 80)  BP: 175/76 (08-19-19 @ 05:13) (127/73 - 175/76)  RR: 18 (08-19-19 @ 05:13) (18 - 18)  SpO2: --    Physical Exam:   GENERAL: NAD, elderly appearing female, lying in bed, resting comfortably  HEENT: NCAT, PERRLA, EOMI  CHEST/LUNG: CTA, No wheezing, rhonchi, rales  HEART: Regular rate and rhythm; s1 s2 appreciated, No murmurs, rubs, or gallops  ABDOMEN: Soft, Nontender, Nondistended; Bowel sounds present  EXTREMITIES: No LE edema b/l, Peripheral pulses palpable, No cyanosis, no clubbing  NERVOUS SYSTEM:  Alert & Oriented X2, non focal    Labs:                         10.8   8.77  )-----------( 289      ( 19 Aug 2019 08:22 )             34.7     Neutophil% 62.4, Lymphocyte% 22.1, Monocyte% 9.1, Bands% 0.5 08-18-19 @ 11:13    19 Aug 2019 08:22    136    |  99     |  17     ----------------------------<  276    4.5     |  23     |  0.9      Ca    9.1        19 Aug 2019 08:22  Mg     2.0       18 Aug 2019 11:13    TPro  6.4    /  Alb  3.8    /  TBili  0.3    /  DBili  x      /  AST  10     /  ALT  6      /  AlkPhos  112    19 Aug 2019 08:22       pTT    31.2             ----< 1.05 INR  (08-18-19 @ 11:13)    12.10        PT    Radiology:   None Today    Assessment and Plan:   This is an 83 year old Female with PMHx of Type 2 DM, Hypertension, Hyperlipidemia, AAA, Brain Aneurysm and history fo Cerebral infarct 6 years ago who presented with reported history of BRBPR for 7 days.    #Hematochezia  - FARTUN in the ED negative for any blood  - Noted red blood mixed with stool  - Hemoglobin held stable  - GI consult; patient no longer needs to be on Plavix, can follow up outpatient with GI doctor   - Continue on PO diet   - Continue on Aspirin    #Constipation  - Likely the underlying cause of the bleeding (suspected hemorrhoids)   - Continue on Senna and Colace    #Type 2 DM  - Continue on Lantus/Humalog regimen that patient takes at home: Lantus 34, Humalog 12/12/12  POCT Blood Glucose.: 268 mg/dL (19 Aug 2019 07:59)  POCT Blood Glucose.: 254 mg/dL (18 Aug 2019 21:00)  POCT Blood Glucose.: 342 mg/dL (18 Aug 2019 16:40)  - Will change diet to carb consistent    #Hypertension  - Continue on Amlodipine and Metoprolol    #Hyperlipidemia  - Atorvastatin 20mg started in place of simvastatin due to medication interaction with Amlodipine     #Anxiety: Continue on Paroxetine and Lorazepam PRN    #History of Cerebral infarct with reported history of AAA and brain aneurysm.   - Spoke with Dr. Du; patient does not have any indication to be on Plavix any more at this time, can discharge home on ASA 81 and follow up with GI as an outpatient.     Activity: As tolerated  Diet: DASH; carb consistent  DVT ppx: Lovenox  GI ppx: Protonix  Code Status: Full Code  DISPO: From home; discharge home today      FINAL DISCHARGE INTERVIEW:  Resident(s) Present: Abhishek Baptiste RN Present: Viv    DISCHARGE MEDICATION RECONCILIATION  reviewed with Attending Dr. Du    DISPOSITION:   [ x ] Home,    [  ] Home with Visiting Nursing Services,   [    ]  SNF/ NH,    [   ] Acute Rehab (4A),   [   ] Other (Specify:_________)

## 2019-08-19 NOTE — DISCHARGE NOTE PROVIDER - NSDCCPCAREPLAN_GEN_ALL_CORE_FT
PRINCIPAL DISCHARGE DIAGNOSIS  Diagnosis: Rectal bleeding  Assessment and Plan of Treatment: You were found to have rectal bleeding from hemorrhoids as a result of dual antiplatelet medication. Your Plavix was stopped and you will not be taking your plavix after discharge. Please follow up with your primary doctor upon discharge and seek referrel to a GI doctor for an outpatient colonoscopy.      SECONDARY DISCHARGE DIAGNOSES  Diagnosis: Diabetes mellitus  Assessment and Plan of Treatment: Your sugar was found to be elevated. Your diet was adjusted while inpatient to ensure it was carb consistent to better control your diabetes. Please adhere to a carbohydrate consistent diet and follow up with your primary doctor for further insulin adjustment.

## 2019-08-19 NOTE — PROGRESS NOTE ADULT - ATTENDING COMMENTS
83 yr old F with multiple problems admitted rectal bleeding for the past few days. Denies any abdominal pain. Tendency to constipation.   Diabetes mellitus poorly controlled due to lack of activity and poor dietary control.  Hypothyroidism on synthroid. Euthyroid clinically.   H/O cerebellar bleed that has affected her ambulation.  Memory impairment on meds for this and also suffers from anxiety disorder.  PLAN. CBC, iron , TIBC, iron saturation, Ferritin, B12, Folate, HBAIC, TSH, Lipids,
I have personally interviewed, examined and planned the management

## 2019-08-19 NOTE — PROGRESS NOTE ADULT - SUBJECTIVE AND OBJECTIVE BOX
GI HPI Today:  Patient is a 83y old  Female who presents with a chief complaint of Rectal Bleeding (19 Aug 2019 10:32)  GI following the patient for rectal bleeding     Interval Events   BRBPR resolved   No abd pain , nausea, vomiting , melena, diarrhea, fever  no prbc transfusions overnight         PAST MEDICAL & SURGICAL HISTORY  Vertigo  Hypothyroid  Brain aneurysm  AAA (abdominal aortic aneurysm)  HLD (hyperlipidemia)  HTN (hypertension)  Depression  Anxiety  Panic disorder  DM (diabetes mellitus)  CVA (cerebral vascular accident)  H/O: hysterectomy  Status post cataract surgery      ALLERGIES:  codeine (Nausea)  IV Contrast (Anaphylaxis)  penicillin (Hives)  shellfish (Unknown)      MEDICATIONS:  MEDICATIONS  (STANDING):  amLODIPine   Tablet 5 milliGRAM(s) Oral daily  aspirin enteric coated 81 milliGRAM(s) Oral daily  atorvastatin 20 milliGRAM(s) Oral at bedtime  dextromethorphan 20 mG/quiNIDine sulfate 10 mG 1 Capsule(s) Oral every 12 hours  docusate sodium 100 milliGRAM(s) Oral daily  gabapentin 300 milliGRAM(s) Oral three times a day  insulin glargine Injectable (LANTUS) 34 Unit(s) SubCutaneous at bedtime  insulin lispro Injectable (HumaLOG) 12 Unit(s) SubCutaneous three times a day with meals  levothyroxine 112 MICROGram(s) Oral daily  LORazepam     Tablet 1 milliGRAM(s) Oral two times a day  meclizine 12.5 milliGRAM(s) Oral daily  metoprolol tartrate 25 milliGRAM(s) Oral two times a day  pantoprazole    Tablet 40 milliGRAM(s) Oral before breakfast  PARoxetine 30 milliGRAM(s) Oral daily  senna 2 Tablet(s) Oral at bedtime  spironolactone 25 milliGRAM(s) Oral daily    MEDICATIONS  (PRN):      REVIEW OF SYSTEMS  General:  No fevers  Eyes:  No reported pain   ENT:  No sore throat   NECK: No stiffness   CV:  No chest pain   Resp:  No shortness of breath  GI:  See HPI  :  No dysuria  Muscle:  No weakness  Neuro:  No tingling  Endocrine:  No polyuria  Heme:  No ecchymosis        VITALS:   T(F): 97.4 (08-19 @ 05:13), Max: 98.5 (08-18 @ 06:47)  HR: 71 (08-19 @ 05:13) (71 - 85)  BP: 175/76 (08-19 @ 05:13) (127/73 - 175/76)  BP(mean): --  RR: 18 (08-19 @ 05:13) (18 - 20)  SpO2: 94% (08-19 @ 08:55) (94% - 97%)        PHYSICAL EXAM:  Gen: comfortable   EYES: No scleral icterus   LUNG: Clear to auscultation bilaterally;  HEART: s1 and s2 heard   ABDOMEN: Soft, +BS, no abd distension, no Abdominal Tenderness, No guarding, No Interiano Sign   Ext: no edema      Blood Work :                        10.8   8.77  )-----------( 289      ( 19 Aug 2019 08:22 )             34.7     PT/INR - ( 18 Aug 2019 11:13 )  INR: 1.05          PTT - ( 18 Aug 2019 11:13 )  PTT:31.2   08-19    136  |  99  |  17  ----------------------------<  276<H>  4.5   |  23  |  0.9    Ca    9.1      19 Aug 2019 08:22  Mg     2.0     08-18      CBC -  ( 19 Aug 2019 08:22 )  Hemoglobin : 10.8    CBC -  ( 18 Aug 2019 11:13 )  Hemoglobin : 10.8    CBC -  ( 17 Aug 2019 21:06 )  Hemoglobin : 11.1      LIVER FUNCTIONS - ( 19 Aug 2019 08:22 )  Alb: 3.8 [3.5 - 5.2] / Pro: 6.4 [6.0 - 8.0] / ALK PHOS: 112 [30 - 115] / ALT: 6 [0 - 41] / AST: 10 [0 - 41] / GGT: x     LIVER FUNCTIONS - ( 18 Aug 2019 11:13 )  Alb: 4.1 [3.5 - 5.2] / Pro: 6.7 [6.0 - 8.0] / ALK PHOS: 121 [30 - 115] / ALT: 7 [0 - 41] / AST: 13 [0 - 41] / GGT: x     LIVER FUNCTIONS - ( 17 Aug 2019 21:06 )  Alb: 4.1 [3.5 - 5.2] / Pro: 7.0 [6.0 - 8.0] / ALK PHOS: 130 [30 - 115] / ALT: 6 [0 - 41] / AST: 11 [0 - 41] / GGT: x

## 2019-08-19 NOTE — DISCHARGE NOTE PROVIDER - HOSPITAL COURSE
This is an 83 year old female with PMHx of Type 2 DM, HTN, HLD, Aortic aneurysm and brain aneurysm who was on plavix and aspirin for a history of cerebral infarct. The patient reported a 7 day history of BRBPR that was exacerbated with a recent manual disimpaction. The patient was evaluated in the ED with a negative FARTUN. Hemoglobin was stable for the duration of the hospitalization. Collateral history with the attending doctor obtained that the patient does not need to be on the Plavix at this time. The plavix will be held and outpatient GI follow up can be done.

## 2019-08-19 NOTE — PHARMACOTHERAPY INTERVENTION NOTE - COMMENTS
pt on norvasc and zocor 40mg hs. I recommended on profile review to change to lipitor 20mg hs. dr varela did

## 2019-08-19 NOTE — PROGRESS NOTE ADULT - ASSESSMENT
84 yo F with PMH of DM, HTN, HLD, Aortic aneurysm, brain aneurysm , CVA, now on ASA and plavix (unclear reason)     presented with painless BRBPR for 7 d with bowel movement associated with constipation   Last colonoscopy: 2007, good prep, SC and DC diverticulosis, large hemorrhoids  Vital signs are stable   Hb stable at 10.8    #) Stable lower GI  bleed - resolved   Associated with dual antiplatelets   Possible sec to hemorrhoids vs diverticulosis vs others including malignancy   Rec   As per primary pt does not need to be on plavix   and currently no rectal bleeding, Hb stable throughout   Colonoscopy can be done as an outpatient   If inpatient colonoscopy is desired then pt has to be off of plavix for 5 days prior to procedure so colonoscopy will be later this week   High fibre diet as tolerated   Avoid constipation , miralax daily   monitor cbc and transfuse as needed   Discussed with house staff
82 yo f pmhx sig for DM, HTN, HLD, CAD on Plavix PW BRBPR for 7 d with every bowel movement.    Hematochezia  -Since last 7 days with bowel movement  -Fresh red sometimes mixed with stool  -Getting worse after manual disimpaction done by herself 2 days ago  -Hb is stable  -GI consult  -Talked with GI doctor Dr. Ontiveros thinks the bleeding is due to external hemorrhoids and severe constipation  -Will start soft diet, patient is hemodynamically stable.  -Can advance the diet if stays stable tomorrow  -Will hold Plavix according to him, if blood count stays stable can restart tomorrow    Constipation  -According to GI the cause of GI bleed  -IF cbc improves will do colonoscopy outpatient.  -Family requesting to get it done inpatient.  -Started on senna, colace and tap water enema during night daily according to GI    DM  -Insulin glargine and lispro home dose    HTN  -On amlodipine and metoprolol    DLD  -On simvastatin    Severe Anxiety  -On paroxetine and lorazepam PRN    Aortic and brain aneurysm  -Was on aspirin and plavix???  -Will hold Plavix     Diet- Soft   Activity - Increase as tolerated  DVT - Sequential  GI - PPI  FULL CODE

## 2019-08-19 NOTE — DISCHARGE NOTE PROVIDER - CARE PROVIDER_API CALL
Divya Du)  EndocrinologyMetabDiabetes; Internal Medicine  [address] [place] [address]  Phone: [phone]  Fax: [phone]  Follow Up Time:

## 2019-08-27 DIAGNOSIS — I71.4 ABDOMINAL AORTIC ANEURYSM, WITHOUT RUPTURE: ICD-10-CM

## 2019-08-27 DIAGNOSIS — F41.0 PANIC DISORDER [EPISODIC PAROXYSMAL ANXIETY]: ICD-10-CM

## 2019-08-27 DIAGNOSIS — Z91.013 ALLERGY TO SEAFOOD: ICD-10-CM

## 2019-08-27 DIAGNOSIS — I67.1 CEREBRAL ANEURYSM, NONRUPTURED: ICD-10-CM

## 2019-08-27 DIAGNOSIS — Z87.891 PERSONAL HISTORY OF NICOTINE DEPENDENCE: ICD-10-CM

## 2019-08-27 DIAGNOSIS — E11.9 TYPE 2 DIABETES MELLITUS WITHOUT COMPLICATIONS: ICD-10-CM

## 2019-08-27 DIAGNOSIS — K62.5 HEMORRHAGE OF ANUS AND RECTUM: ICD-10-CM

## 2019-08-27 DIAGNOSIS — K64.4 RESIDUAL HEMORRHOIDAL SKIN TAGS: ICD-10-CM

## 2019-08-27 DIAGNOSIS — F41.9 ANXIETY DISORDER, UNSPECIFIED: ICD-10-CM

## 2019-08-27 DIAGNOSIS — Z86.73 PERSONAL HISTORY OF TRANSIENT ISCHEMIC ATTACK (TIA), AND CEREBRAL INFARCTION WITHOUT RESIDUAL DEFICITS: ICD-10-CM

## 2019-08-27 DIAGNOSIS — F03.90 UNSPECIFIED DEMENTIA WITHOUT BEHAVIORAL DISTURBANCE: ICD-10-CM

## 2019-08-27 DIAGNOSIS — Z88.0 ALLERGY STATUS TO PENICILLIN: ICD-10-CM

## 2019-08-27 DIAGNOSIS — I25.10 ATHEROSCLEROTIC HEART DISEASE OF NATIVE CORONARY ARTERY WITHOUT ANGINA PECTORIS: ICD-10-CM

## 2019-08-27 DIAGNOSIS — E03.9 HYPOTHYROIDISM, UNSPECIFIED: ICD-10-CM

## 2019-08-27 DIAGNOSIS — Z79.84 LONG TERM (CURRENT) USE OF ORAL HYPOGLYCEMIC DRUGS: ICD-10-CM

## 2019-08-27 DIAGNOSIS — I10 ESSENTIAL (PRIMARY) HYPERTENSION: ICD-10-CM

## 2019-08-27 DIAGNOSIS — F32.9 MAJOR DEPRESSIVE DISORDER, SINGLE EPISODE, UNSPECIFIED: ICD-10-CM

## 2019-08-27 DIAGNOSIS — E78.5 HYPERLIPIDEMIA, UNSPECIFIED: ICD-10-CM

## 2019-09-02 ENCOUNTER — EMERGENCY (EMERGENCY)
Facility: HOSPITAL | Age: 84
LOS: 0 days | Discharge: HOME | End: 2019-09-02
Attending: EMERGENCY MEDICINE | Admitting: EMERGENCY MEDICINE
Payer: MEDICARE

## 2019-09-02 VITALS
OXYGEN SATURATION: 96 % | DIASTOLIC BLOOD PRESSURE: 85 MMHG | HEART RATE: 89 BPM | TEMPERATURE: 97 F | RESPIRATION RATE: 16 BRPM | SYSTOLIC BLOOD PRESSURE: 148 MMHG

## 2019-09-02 VITALS — DIASTOLIC BLOOD PRESSURE: 120 MMHG | SYSTOLIC BLOOD PRESSURE: 180 MMHG

## 2019-09-02 DIAGNOSIS — Z98.890 OTHER SPECIFIED POSTPROCEDURAL STATES: Chronic | ICD-10-CM

## 2019-09-02 DIAGNOSIS — Z00.00 ENCOUNTER FOR GENERAL ADULT MEDICAL EXAMINATION WITHOUT ABNORMAL FINDINGS: ICD-10-CM

## 2019-09-02 DIAGNOSIS — Z88.0 ALLERGY STATUS TO PENICILLIN: ICD-10-CM

## 2019-09-02 DIAGNOSIS — Z88.5 ALLERGY STATUS TO NARCOTIC AGENT: ICD-10-CM

## 2019-09-02 DIAGNOSIS — Z87.891 PERSONAL HISTORY OF NICOTINE DEPENDENCE: ICD-10-CM

## 2019-09-02 DIAGNOSIS — Z91.013 ALLERGY TO SEAFOOD: ICD-10-CM

## 2019-09-02 DIAGNOSIS — Z98.49 CATARACT EXTRACTION STATUS, UNSPECIFIED EYE: Chronic | ICD-10-CM

## 2019-09-02 DIAGNOSIS — Z91.041 RADIOGRAPHIC DYE ALLERGY STATUS: ICD-10-CM

## 2019-09-02 PROCEDURE — 99283 EMERGENCY DEPT VISIT LOW MDM: CPT

## 2019-09-02 RX ORDER — AMLODIPINE BESYLATE 2.5 MG/1
10 TABLET ORAL ONCE
Refills: 0 | Status: COMPLETED | OUTPATIENT
Start: 2019-09-02 | End: 2019-09-02

## 2019-09-02 RX ADMIN — AMLODIPINE BESYLATE 10 MILLIGRAM(S): 2.5 TABLET ORAL at 21:15
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
## 2019-09-02 NOTE — ED PROVIDER NOTE - PATIENT PORTAL LINK FT
You can access the FollowMyHealth Patient Portal offered by [facility] by registering at the following website: [email or web address]. By joining [employer]’s FollowMyHealth portal, you will also be able to view your health information using other applications (apps) compatible with our system.

## 2019-09-02 NOTE — ED PROVIDER NOTE - NSFOLLOWUPCLINICS_GEN_ALL_ED_FT
A Family Medicine Doctor  Family Medicine  .  [place]   Phone:   Fax:   Follow Up Time: 1-3 Days    [address]   Phone: [phone]  Fax:   Follow Up Time: 1-3 Days
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
## 2019-09-02 NOTE — ED PROVIDER NOTE - NS ED ROS FT
Review of Systems  Constitutional:  No fever or chills.   Eyes:  No visual changes, eye pain, or discharge.  ENMT:  No nasal congestion, discharge, or throat pain.   Cardiac:  No chest pain, syncope, or edema.  Respiratory:  No dyspnea, wheezing, or cough. No hemoptysis.  GI:  No vomiting, diarrhea, or abdominal pain. No melena or hematochezia.  :  No dysuria or hematuria.   Musculoskeletal:  No joint swelling, joint pain, or back pain.  Skin:  No skin rash, jaundice, or lesions.  Neuro:  No headache, loss of sensation, or focal weakness.  No change in mental status.

## 2019-09-02 NOTE — ED PROVIDER NOTE - PROGRESS NOTE DETAILS
Care d/w SW and patient safe to return home at this time. patient has great support system. patient has asymptomatic elevated BP and EMS will not transport. patient on amlodipine. will give dose at this time and reassess

## 2019-09-02 NOTE — ED PROVIDER NOTE - PHYSICAL EXAMINATION
Physical Exam  General: Awake, alert, NAD, WDWN, NCAT, no skull/facial tender, no step-offs, no raccoon/boland, non-toxic appearing  Eyes: PERRL, EOMI, no icterus, lids and conjunctivae are normal  ENT: External inspection normal, pink/moist membranes, pharynx normal  CV: S1S2, regular rate and rhythm, no murmur/gallops/rubs, no JVD, 2+ pulses b/l, no edema/cords/homans, warm/well-perfused  Respiratory: Normal respiratory rate/effort, no respiratory distress, normal voice, speaking full sentences, lungs clear to auscultation b/l, no wheezing/rales/rhonchi, no retractions, no stridor  Abdomen: Soft abdomen, no tender/distended/guarding/rebound, no CVA tender  Musculoskeletal: FROM all 4 extremities, N/V intact, pelvis stable, no TLS spinal tender/deform/step-offs, no liliane tender/deform  Neck: FROM neck, supple, no meningismus, trachea midline, no JVD, no cspine tender/step-offs  Integumentary: Color normal for race, warm and dry, no rash. No lacerations, abrasions, or ecchymosis.   Neuro: Oriented person/place/month, CN 2-12 intact, normal motor, normal sensory  Psych: Oriented x3, mood normal, affect normal, denies SI/HI, denies AV hallucinations
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
## 2019-09-02 NOTE — ED PROVIDER NOTE - NSFOLLOWUPINSTRUCTIONS_ED_ALL_ED_FT
Return to the ER for worsening or concerning symptoms.    See printed discharge information sheets for further instructions on care for your condition.    Medical Screening Exam  A medical screening exam helps determine whether or not you need emergency medical treatment.    During the medical screening exam, a health care provider does a short physical exam and medical history to assess:    Your current symptoms.  Your overall health.    Depending on your symptoms, you may need additional tests.    What are the possible outcomes of a medical screening exam?  Your medical screening exam may determine that:    You do not need emergency treatment at this time.  You need treatment right away.  You need to be transferred to another medical center.    When should I seek medical care?  If you have a regular health care provider, make an appointment for a follow-up visit with him or her. If you do not have a regular health care provider, ask about resources in your community.    Get help right away if:  Your condition may change over time. If your condition gets worse or you develop new or troubling symptoms before you see your health care provider, go to an emergency department right away.    In an emergency:     Call 911 or have someone drive you to the nearest hospital.  Do not drive yourself.  This information is not intended to replace advice given to you by your health care provider. Make sure you discuss any questions you have with your health care provider.

## 2020-05-11 NOTE — ED PROVIDER NOTE - NS ED MD EM SELECTION
Addended by: CHESTER CASTELLANOS on: 5/11/2020 11:49 AM     Modules accepted: Orders    
[id number] Comprehensive

## 2020-08-17 NOTE — DISCHARGE NOTE ADULT - MEDICATION SUMMARY - MEDICATIONS TO STOP TAKING
Attending Attestation (For Attendings USE Only)...
I will STOP taking the medications listed below when I get home from the hospital:  None

## 2021-10-09 NOTE — DISCHARGE NOTE ADULT - NSTOBACCONEVERSMOKERY/N_GEN_A
Problem: Falls - Risk of  Goal: *Absence of Falls  Description: Document Mayte French Fall Risk and appropriate interventions in the flowsheet. Outcome: Progressing Towards Goal  Note: Fall Risk Interventions:  Mobility Interventions: Assess mobility with egress test         Medication Interventions: Bed/chair exit alarm    Elimination Interventions: Bed/chair exit alarm              Problem: Patient Education: Go to Patient Education Activity  Goal: Patient/Family Education  Outcome: Progressing Towards Goal     Problem: Patient Education: Go to Patient Education Activity  Goal: Patient/Family Education  Outcome: Progressing Towards Goal     Problem: Patient Education: Go to Patient Education Activity  Goal: Patient/Family Education  Outcome: Progressing Towards Goal     Problem: Patient Education: Go to Patient Education Activity  Goal: Patient/Family Education  Outcome: Progressing Towards Goal     Problem: Patient Education: Go to Patient Education Activity  Goal: Patient/Family Education  Outcome: Progressing Towards Goal     Problem: Pressure Injury - Risk of  Goal: *Prevention of pressure injury  Description: Document Ignacio Scale and appropriate interventions in the flowsheet.   Outcome: Progressing Towards Goal  Note: Pressure Injury Interventions:  Sensory Interventions: Assess changes in LOC    Moisture Interventions: Absorbent underpads    Activity Interventions: Increase time out of bed    Mobility Interventions: Float heels    Nutrition Interventions: Document food/fluid/supplement intake    Friction and Shear Interventions: HOB 30 degrees or less No

## 2021-12-04 NOTE — ED CDU PROVIDER INITIAL DAY NOTE - NEURO NEGATIVE STATEMENT, MLM
Hospital Medicine History & Physical      PCP: Chris Mccoy MD    Date of Admission: 12/3/2021    Chief Complaint:  Confusion, hearing loss    History Of Present Illness:  Patient is a 52-year-old male with past medical history of ADHD, erectile dysfunction hypertension PTSD who presented to hospital for left-sided head and neck pain and change in mental status. Patient is not interested in providing history, seems to be alert awake and able to hold conversations however seems to be trying to avoid conversations and most of the history is being provided by patient's partner at the bedside. According to the patient significant other, patient did have numbness of left side of his face diarrhea started yesterday, he also had difficulty hearing and he lost his hearing yesterday otherwise no reported fevers chills nausea vomiting diarrhea constipation dysuria. Past Medical History:          Diagnosis Date    ADHD (attention deficit hyperactivity disorder)     Anxiety     Chronic back pain     Depression     Erectile dysfunction     Headache     Hypertension     Hypothyroidism     Low testosterone     Low testosterone level in male     Neuropathy     PTSD (post-traumatic stress disorder)     Thyroid disease        Past Surgical History:          Procedure Laterality Date    BICEPS TENDON REPAIR Left     SHOULDER SURGERY Right        Medications Prior to Admission:      Prior to Admission medications    Medication Sig Start Date End Date Taking?  Authorizing Provider   acetaminophen (TYLENOL) 500 MG tablet Take 500 mg by mouth every 6 hours as needed 7/9/21  Yes Historical Provider, MD   levothyroxine (SYNTHROID) 125 MCG tablet Take 125 mcg by mouth Daily  10/22/21  Yes Historical Provider, MD   metoprolol (LOPRESSOR) 100 MG tablet TAKE ONE TABLET BY MOUTH TWICE A DAY 3/22/21  Yes Nino Robertson MD       Allergies:  Bactrim [sulfamethoxazole-trimethoprim]    Social History:      TOBACCO: reports that he has quit smoking. His smoking use included cigarettes. He has never used smokeless tobacco.  ETOH:   reports current alcohol use. Family History:       Reviewed in detail and non contributory      History reviewed. No pertinent family history. REVIEW OF SYSTEMS:   Pertinent positives as noted in the HPI. All other systems reviewed and negative. PHYSICAL EXAM PERFORMED:    BP (!) 166/94   Pulse 65   Temp 98 °F (36.7 °C) (Oral)   Resp 16   Ht 5' 9\" (1.753 m)   Wt 218 lb 7.6 oz (99.1 kg)   SpO2 95%   BMI 32.26 kg/m²     General appearance:  No apparent distress, cooperative. HEENT:  Normal cephalic, atraumatic without obvious deformity. Conjunctivae/corneas clear. Neck: Supple, with full range of motion. No cervical lymphadenopathy  Respiratory:  Normal respiratory effort. Clear to auscultation, bilaterally without Rales/Wheezes/Rhonchi. Cardiovascular:  Regular rate and rhythm with normal S1/S2 without murmurs, rubs or gallops. Abdomen: Soft, non-tender, non-distended, normal bowel sounds. Musculoskeletal:  No edema noted bilaterally. No tenderness on palpation   Skin: no rash visible  Neurologic:  Neurologically intact without any focal sensory/motor deficits. grossly non-focal.  Psychiatric:  Alert and oriented, normal mood  Peripheral Pulses: +2 palpable, equal bilaterally       Labs:     Recent Labs     12/03/21  1424   WBC 7.6   HGB 18.2*   HCT 53.8*        Recent Labs     12/03/21  1424      K 4.2      CO2 23   BUN 14   CREATININE 1.0   CALCIUM 9.6     Recent Labs     12/03/21  1424   AST 43*   ALT 34   BILITOT 1.2*   ALKPHOS 113     No results for input(s): INR in the last 72 hours. No results for input(s): Mariela Horn in the last 72 hours.     Urinalysis:      Lab Results   Component Value Date    NITRU Negative 12/03/2021    WBCUA 1 12/03/2021    RBCUA 2 12/03/2021    BLOODU Negative 12/03/2021    SPECGRAV >1.030 12/03/2021    GLUCOSEU no loss of consciousness, no gait abnormality, no headache, no sensory deficits, and no weakness.

## 2022-01-01 NOTE — PATIENT PROFILE ADULT - HAS THE PATIENT BEEN ADMITTED FROM SHORT TERM REHAB?
ED Sign Out eval for undifferentiated abd pain, pending imaging and close reassessments / PO challenge for tx/dispo decisions -- Royer Foley MD [facility] 1 surgery aware of pt and sent in [facility] 1 pt made aware of intraabdominal abscesses \  will start zoysn [facility] PGY 1 pt made aware of intraabdominal abscesses \  will start cefpime discussion with surgery, patient not septic at this time, would have elective IR procedure when availability allows. shared decision making with patient: patient would like to be dc and follow up with IR as a lecetive procedure. strict return precations relayed to patient. Patient understands and agrees. no

## 2022-10-18 NOTE — PROGRESS NOTE ADULT - SUBJECTIVE AND OBJECTIVE BOX
You may receive a short survey about today's visit. Your care provider team, Dr Swanson, nursing staff and PSRs,  would appreciate if you would take a few minutes to share your feedback.  If you had any lab work or xrays done today or have any performed in the future, please look for results and recommendations from our office via [facility].    Thank you,  Dr. Swanson and staff     Controlling Your Cholesterol  Cholesterol is a waxy substance. It travels in your blood through the blood vessels. When you have high cholesterol, it builds up in the walls of the blood vessels. This makes the vessels narrower. Blood flow decreases. You are then at greater risk for having a heart attack or a stroke.  Good and Bad Cholesterol  Lipids are fats. Blood is mostly water. Fat and water don’t mix. So our bodies need lipoproteins (lipids inside a protein shell) to carry the lipids. The protein shell carries its lipids through the bloodstream. There are two main kinds of lipoproteins:  LDL (low-density lipoprotein) is known as “bad cholesterol.” It mainly carries cholesterol. It delivers this cholesterol to body cells. Excess LDL cholesterol will build up in artery walls. This increases your risk for heart disease and stroke.  HDL (high-density lipoprotein) is known as “good cholesterol.” This protein shell collects excess cholesterol that LDLs have left behind on blood vessel walls. That’s why high levels of HDL cholesterol can decrease your risk of heart disease and stroke.  Controlling Cholesterol Levels  Total cholesterol includes LDL and HDL cholesterol, as well as other fats in the bloodstream. If your total cholesterol is high, follow the steps below to help lower your total cholesterol level:  Eat less unhealthy fat:  Cut back on saturated fats and trans (also called hydrogenated) fats by selecting lean cuts of meat, low-fat dairy, and using oils instead of solid fats. Llimit baked goods, processed  Patient is a 83y old  Female who presents with a chief complaint of Abdominal pain,   Found to have partial SBO (28 Mar 2019 00:29)    Interval events: NGT placed    Today is hospital day 1  Patient is  resting comfortably in bed  Denies Abdominal pain, last BM was yesterday PTP  + Bowel sounds but faint, NT, ND  Plan: Obstructive series Per Surgery  -No surgical intervention for now    PAST MEDICAL & SURGICAL HISTORY:  Vertigo  Hypothyroid  Brain aneurysm  AAA (abdominal aortic aneurysm)  HLD (hyperlipidemia)  HTN (hypertension)  Depression  Anxiety  Panic disorder  DM (diabetes mellitus)  CVA (cerebral vascular accident)  H/O: hysterectomy  Status post cataract surgery      MEDICATIONS  (STANDING):  amLODIPine   Tablet 5 milliGRAM(s) Oral daily  aspirin enteric coated 81 milliGRAM(s) Oral daily  chlorhexidine 4% Liquid 1 Application(s) Topical <User Schedule>  clopidogrel Tablet 75 milliGRAM(s) Oral daily  dextrose 5%. 1000 milliLiter(s) (50 mL/Hr) IV Continuous <Continuous>  dextrose 50% Injectable 12.5 Gram(s) IV Push once  dextrose 50% Injectable 25 Gram(s) IV Push once  dextrose 50% Injectable 25 Gram(s) IV Push once  enoxaparin Injectable 40 milliGRAM(s) SubCutaneous daily  influenza   Vaccine 0.5 milliLiter(s) IntraMuscular once  insulin glargine Injectable (LANTUS) 34 Unit(s) SubCutaneous every morning  insulin lispro (HumaLOG) corrective regimen sliding scale   SubCutaneous three times a day before meals  insulin lispro Injectable (HumaLOG) 4 Unit(s) SubCutaneous before breakfast  insulin lispro Injectable (HumaLOG) 4 Unit(s) SubCutaneous before lunch  insulin lispro Injectable (HumaLOG) 4 Unit(s) SubCutaneous before dinner  lactated ringers. 1000 milliLiter(s) (75 mL/Hr) IV Continuous <Continuous>  levothyroxine 112 MICROGram(s) Oral daily  LORazepam     Tablet 0.5 milliGRAM(s) Oral at bedtime  pantoprazole    Tablet 40 milliGRAM(s) Oral before breakfast  PARoxetine 40 milliGRAM(s) Oral daily  simvastatin 40 milliGRAM(s) Oral at bedtime    MEDICATIONS  (PRN):  dextrose 40% Gel 15 Gram(s) Oral once PRN Blood Glucose LESS THAN 70 milliGRAM(s)/deciliter  glucagon  Injectable 1 milliGRAM(s) IntraMuscular once PRN Glucose LESS THAN 70 milligrams/deciliter          Vital Signs Last 24 Hrs  T(C): 37.2 (28 Mar 2019 12:30), Max: 37.2 (28 Mar 2019 12:30)  T(F): 98.9 (28 Mar 2019 12:30), Max: 98.9 (28 Mar 2019 12:30)  HR: 88 (28 Mar 2019 12:30) (82 - 88)  BP: 119/78 (28 Mar 2019 12:30) (119/78 - 159/74)  BP(mean): --  RR: 17 (28 Mar 2019 12:30) (16 - 17)  SpO2: --  CAPILLARY BLOOD GLUCOSE      POCT Blood Glucose.: 195 mg/dL (28 Mar 2019 11:21)  POCT Blood Glucose.: 292 mg/dL (28 Mar 2019 07:45)  POCT Blood Glucose.: 193 mg/dL (27 Mar 2019 22:03)    I&O's Summary    27 Mar 2019 07:01  -  28 Mar 2019 07:00  --------------------------------------------------------  IN: 75 mL / OUT: 0 mL / NET: 75 mL    28 Mar 2019 07:01  -  28 Mar 2019 13:11  --------------------------------------------------------  IN: 150 mL / OUT: 0 mL / NET: 150 mL        Physical Exam:    -     General : NAD, laying comfortably in bed, NGT in place draining clear/mucous  liquid     -      Cardiac: S1/S2 appreciated, RRR    -      Pulm: CTA b/l, no adventitious sound    -      GI: Faint BS, ND, NT, Soft, denies flatus    -      Musculoskeletal: Atraumatic, no edema, no skin color changes    -      Neuro: AO x 3, non focal         Labs:                        13.2   8.87  )-----------( 389      ( 28 Mar 2019 07:31 )             42.1             -    138  |  96<L>  |  22<H>  ----------------------------<  290<H>  4.7   |  25  |  1.2    Ca    9.3      28 Mar 2019 07:31  Mg     1.8         TPro  6.7  /  Alb  4.0  /  TBili  0.6  /  DBili  x   /  AST  11  /  ALT  6   /  AlkPhos  105      LIVER FUNCTIONS - ( 28 Mar 2019 07:31 )  Alb: 4.0 g/dL / Pro: 6.7 g/dL / ALK PHOS: 105 U/L / ALT: 6 U/L / AST: 11 U/L / GGT: x                 PT/INR - ( 27 Mar 2019 14:45 )   PT: 12.50 sec;   INR: 1.09 ratio         PTT - ( 27 Mar 2019 14:45 )  PTT:35.4 sec  CARDIAC MARKERS ( 27 Mar 2019 14:45 )  x     / <0.01 ng/mL / x     / x     / x            Urinalysis Basic - ( 27 Mar 2019 14:00 )    Color: Yellow / Appearance: Cloudy / S.025 / pH: x  Gluc: x / Ketone: Negative  / Bili: Small / Urobili: 1.0 mg/dL   Blood: x / Protein: Trace mg/dL / Nitrite: Negative   Leuk Esterase: Small / RBC: 1-2 /HPF / WBC 3-5 /HPF   Sq Epi: x / Non Sq Epi: Few /HPF / Bacteria: x      Imaging:    ECG: meats, and fried foods. A diet that’s high in these fats increases your bad cholesterol. It’s not enough to just cut back on foods containing cholesterol.  Eat about 2 servings of fish per week. Most fish contain omega-3 fatty acids. These help lower blood cholesterol.  Eat more whole grains and soluble fiber (such as oat bran). These lower overall cholesterol.  Be active:  Choose an activity you enjoy. Walking, swimming, and riding a bike are some good ways to be active.  Start at a level where you feel comfortable. Increase your time and pace a little each week.  Work up to 40 minutes of moderate to high intensity physical activity at least 3 to 4 days per week.  Remember, some activity is better than none.  If you haven’t been exercising regularly, start slowly. Check with your doctor to make sure the exercise plan is right for you.  Quit smoking. Quitting smoking can improve your lipid levels. It also lowers your risk for heart disease and stroke.  Weight management. If you are overweight or obese, your health care provider will work with you to lose weight and lower your BMI (body mass index) to a normal or near-normal level. Making diet changes and increasing physical activity can help.    Take medication as directed. Many people need medication to get their LDL levels to a safe level. Medication to lower cholesterol levels is effective and safe. (But taking medication is not a substitute for exercise or watching your diet!) Your doctor can tell you whether you might benefit from a cholesterol-lowering medication.  © 1327-5208 [employer]. [address] [place] [address], [place] [address]. All rights reserved. This information is not intended as a substitute for professional medical care. Always follow your healthcare professional's instructions.      Lifestyle Changes to Control Cholesterol  Diet, exercise, weight management, quitting smoking, stress management, and taking your medications right can  help you control your cholesterol.  Diet  Your health care provider will give you information on changes to your diet you may need to make, based on your situation. Your provider may recommend that you see a registered dietitian for help with diet changes. Changes may include:  Reducing the amount of fat and cholesterol in your meals   Reducing the amount of sodium (salt) in your food, especially if you have high blood pressure   Eating more fresh vegetables and fruits   Eating lean proteins, such as fish, poultry, and legumes (beans and peas), and eating less red meat and processed meats   Using low-fat dairy products   Using vegetable and nut oils in limited amounts   Limiting how many sweets and processed foods like chips, cookies, and baked goods that you eat   Exercise  Regular exercise is a good way to help your body control cholesterol. Regular exercise has many benefits. It can:  Raise your good cholesterol.  Help lower your bad cholesterol.  Let blood flow better through your body.  Give more oxygen to your muscles and tissues.  Help you manage your weight.  Your health care provider may recommend that you get more physical activity if you haven't been active. Depending on your situation, your provider may recommend that you get moderate to vigorous physical activity for at least 40 minutes each day and for at least 3 to 4 days each week. A few examples of moderate to vigorous activity include:  Walking at a brisk pace, about 3 to 4 miles per hour   Jogging or running   Swimming or water aerobics   Hiking   Dancing   Martial arts   Tennis   Riding a bicycle or stationary bike   Dancing  Weight management  If you are overweight or obese, your health care provider will work with you to help you lose weight and lower your BMI (body mass index) to a normal or near-normal level. Making diet changes and getting more physical activity can help.    Quitting Smoking  Smoking and other tobacco use can raise  cholesterol and make it harder to control. Quitting is tough. But millions of people have given up tobacco for good. You can quit, too! Think about some of the reasons below to quit smoking. Do any of them make you think twice about your smoking habit?  Quit Smoking   Stop smoking because it:  Keeps your cholesterol high, even if you make all the other changes you’re supposed to.  Damages your body, especially your heart, lungs, and blood vessels.  Makes you more likely to have a heart attack (also known as acute myocardial infarction, or AMI), stroke, or cancer.  Stains your teeth and makes your skin, clothes, and breath smell bad.  Costs a lot of money.  Stress   Learn stress-management techniques to help you deal with stress in your home and work life.   Making the Most of Medications  Healthy eating and exercise are a good start to keeping your cholesterol down. But you may need some extra help from medication. If your doctor prescribes medication, be sure to take it exactly as directed. Remember:  Tell your doctor about all other medications you take, including vitamins and herbs.  Tell your doctor if you have any side effects after starting to take a medication. Examples of side effects to watch for include: muscle aches, weakness, blurred vision, rust-colored urine, yellowing of eyes or skin (jaundice), or headache.  Don’t skip a dose or stop taking your medication because you feel better or because your cholesterol numbers go down. Never stop taking your medication unless your doctor has told you it’s OK.  © 7579-7847 [employer]. [address] [place] [address], [place] [address]. All rights reserved. This information is not intended as a substitute for professional medical care. Always follow your healthcare professional's instructions.    WHAT ARE TRIGLYCERIDES?  Triglycerides are a form of fat, which are in some foods and manufactured by the body. Blood triglycerides increase following a meal. For  an accurate triglyceride assessment, a blood sample is drawn in a fasting state, meaning no food or drink except water for 12 hours before the test. People with high triglycerides often have high total cholesterol, high LDL (bad) cholesterol, and low HDL (good) cholesterol.    HOW CAN TRIGLYCERIDES BE REDUCED?  Try to maintain proper body weight through a combination of exercise and diet.  Follow a low-fat diet, but still get 20-30% of your calories from fat. The fat consumed should be concentrated in monosaturated fats. Sources of monosaturated fats include avocados, olive oil, canola oil, nut oils, olives, and nuts. Of course, olives and salted nuts have to be used with discretion if you have been told to be on a low-salt diet. Peanuts are not true nuts, though they are aceptable. The best nuts include almonds, macadamias, hazelnuts, and pecans.   If diabetic, be sure your diabetes is tightly controlled.   Rare or no alcohol also helps lower triglycerides. Ask your doctor whether you should have any alcohol.   Complex carbohydrates such as fruits and vegetables are encouraged, but simple carbohydrates as in pastas, breads, and sugars, should be used less often than on a traditional low-fat diet.   Consumption of fatty ocean fish can also help. Fatty fish includes salmon, herring, sardines, mackerel, and tuna. A standard serving, about the size of a deck of cards, would be appropriate. A serving daily would be ideal, though that tends to be difficult to follow. Fish oil capsules may be an alternative, though you should ask your doctor what role, if any he/she feels fish oil capsules would have in your management.   Prescription medicines also have a role, but not everyone needs drug therapy to accomplish control of triglycerides. Your doctor will advise you on whether you need prescription medicines.     FISH OIL CAPSULES  High Dose:  Needed to reduce triglycerides.  Goal of 3,000 to 4,000 mg of EPA plus DHA daily,  in three divided doses with food.   The fish oil capsules should be labeled with the number of mg of these fats. The number of capsules will depend upon the potency of the pill.    Low Dose:  One, three times daily with food. Does not reduce triglycerides, but has been shown to reduce risk for sudden death. Some cardiologists advise low-dose fish oil capsules.  Take frozen if burpinp up fish taste. Fish oil capsules also have calories in them, so you have to be certain to watch other calorie intake so there is no weight gain.

## 2023-02-09 ENCOUNTER — NON-APPOINTMENT (OUTPATIENT)
Age: 88
End: 2023-02-09

## 2023-02-09 DIAGNOSIS — Z86.39 PERSONAL HISTORY OF OTHER ENDOCRINE, NUTRITIONAL AND METABOLIC DISEASE: ICD-10-CM

## 2023-02-09 DIAGNOSIS — E11.9 TYPE 2 DIABETES MELLITUS W/OUT COMPLICATIONS: ICD-10-CM

## 2023-02-09 DIAGNOSIS — Z86.79 PERSONAL HISTORY OF OTHER DISEASES OF THE CIRCULATORY SYSTEM: ICD-10-CM

## 2023-02-09 DIAGNOSIS — Z86.59 PERSONAL HISTORY OF OTHER MENTAL AND BEHAVIORAL DISORDERS: ICD-10-CM

## 2023-02-09 DIAGNOSIS — Z87.891 PERSONAL HISTORY OF NICOTINE DEPENDENCE: ICD-10-CM

## 2023-02-09 DIAGNOSIS — Z87.898 PERSONAL HISTORY OF OTHER SPECIFIED CONDITIONS: ICD-10-CM

## 2023-02-09 DIAGNOSIS — I50.30 UNSPECIFIED DIASTOLIC (CONGESTIVE) HEART FAILURE: ICD-10-CM

## 2023-02-09 DIAGNOSIS — I71.9 AORTIC ANEURYSM OF UNSPECIFIED SITE, W/OUT RUPTURE: ICD-10-CM

## 2023-02-09 RX ORDER — AMLODIPINE BESYLATE 10 MG/1
10 TABLET ORAL
Refills: 0 | Status: ACTIVE | COMMUNITY

## 2023-02-09 RX ORDER — ROSUVASTATIN CALCIUM 20 MG/1
20 TABLET, FILM COATED ORAL
Refills: 0 | Status: ACTIVE | COMMUNITY

## 2023-02-09 RX ORDER — LORAZEPAM 0.5 MG/1
0.5 TABLET ORAL
Refills: 0 | Status: ACTIVE | COMMUNITY

## 2023-02-09 RX ORDER — LITHIUM CARBONATE 150 MG/1
150 CAPSULE ORAL
Refills: 0 | Status: ACTIVE | COMMUNITY

## 2023-04-11 NOTE — CONSULT NOTE ADULT - ASSESSMENT
1] Atypical Chest Pain/Epigastric Discomfort - doubt cardiac.  Highly probable GI etiology      Non-specific ST-T changes.    - may discontinue telemetry in am of June 25, 2018 if patient remains stable  - no further cardiac workup; recent MPI in March 2018 is negative for ischemia    2] Hypertension  - continue present meds    3] Dyslipidemia  - on statin therapy    4] Type DM  - Glycemic control  - on insulin    DVT prophylaxis with SQ Heparin    Will follow as needed    Nacho Ruiz MD   [phone] Office
(V5) oriented

## 2023-10-09 NOTE — PATIENT PROFILE ADULT. - NS PRO ABUSE SCREEN AFRAID ANYONE YN
Patients mother requesting a note for dance that is specific on what she can and cannot do. LOV: 8/25/23  - \"Transition out of boot in two weeks. We recommend BF ankle compression brace. We recommended physical therapy to aid in strengthening, range of motion, functional improvement, and return to baseline activity. \"    CONCLUSION:  The tiny fracture fragment at the base of the 5th metatarsal is no longer identified and may have healed.      Future Appointments   Date Time Provider Jeremy Ventura   10/25/2023  1:50 PM DARIEN Hinkle [facility] David Noel [id number] no

## 2023-11-24 NOTE — ED PROVIDER NOTE - PMH
The History and Physical has been reviewed, the patient has been examined and no changes have occurred in the patient's condition since the H & P was completed.       AAA (abdominal aortic aneurysm)    Anxiety    Brain aneurysm    CVA (cerebral vascular accident)    Depression    DM (diabetes mellitus)    HLD (hyperlipidemia)    HTN (hypertension)    Hypothyroid    Panic disorder    Vertigo

## 2024-02-05 NOTE — DISCHARGE NOTE ADULT - CARE PROVIDERS DIRECT ADDRESSES
Name: Love Rosario      : 1973      MRN: [id number]  Encounter Provider: Luke Gibbs MD  Encounter Date: 2024   Encounter department: [facility] [address]    Assessment & Plan   Return visit in 4 months  1. Mixed hyperlipidemia  Assessment & Plan:  Continue Crestor 10 mg daily    Orders:  -     CBC and differential; Future  -     Comprehensive metabolic panel; Future  -     Lipid panel; Future    2. Anxiety  Assessment & Plan:  Continue Ativan 2 mg every 6 hours as needed    Orders:  -     sertraline (ZOLOFT) 100 mg tablet; Take 1 tablet (100 mg total) by mouth daily    3. Moderate episode of recurrent major depressive disorder (HCC)  Assessment & Plan:  Decrease Zoloft 100 mg daily.  Continue Abilify 30 mg daily      4. Urinary incontinence, unspecified type  Assessment & Plan:  Continue Ditropan 5 mg daily      5. Former smoker    6. Colon cancer screening  -     Ambulatory Referral to Gastroenterology; Future        Depression Screening and Follow-up Plan: Patient was screened for depression during today's encounter. They screened negative with a PHQ-9 score of 0.    Tobacco Cessation Counseling: Tobacco cessation counseling was provided. The patient is sincerely urged to quit consumption of tobacco. She is not ready to quit tobacco.         Subjective      Patient comes in for checkup.  She reports quitting smoking about 7 weeks ago.  Her anxiety and depression are doing well.  She saw her gynecologist for vaginal dryness who recommended she decrease or switch her antidepressants.      Review of Systems   Constitutional: Negative.    Respiratory: Negative.     Cardiovascular: Negative.        Current Outpatient Medications on File Prior to Visit   Medication Sig    ARIPiprazole (ABILIFY) 30 mg tablet Take 1 tablet (30 mg total) by mouth daily    cyclobenzaprine (FLEXERIL) 10 mg tablet TAKE 1 TABLET (10 MG TOTAL) BY MOUTH 3 (THREE) TIMES A DAY AS NEEDED  "FOR MUSCLE SPASMS    dicyclomine (BENTYL) 20 mg tablet TAKE 1 TABLET (20 MG TOTAL) BY MOUTH 4 (FOUR) TIMES A DAY AS NEEDED (ABDOMINAL PAIN)    gabapentin (NEURONTIN) 300 mg capsule TAKE 1 CAPSULE (300 MG TOTAL) BY MOUTH 3 (THREE) TIMES A DAY    LORazepam (ATIVAN) 2 mg tablet Take 1 tablet (2 mg total) by mouth every 6 (six) hours as needed for anxiety    ondansetron (ZOFRAN) 4 mg tablet Take 1 tablet (4 mg total) by mouth every 8 (eight) hours as needed for nausea or vomiting    oxybutynin (DITROPAN-XL) 5 mg 24 hr tablet Take 1 tablet (5 mg total) by mouth daily    predniSONE 20 mg tablet Take 20 mg by mouth if needed    rosuvastatin (CRESTOR) 10 MG tablet TAKE 1 TABLET (10 MG TOTAL) BY MOUTH DAILY    traZODone (DESYREL) 100 mg tablet TAKE 2 TABLETS (200 MG TOTAL) BY MOUTH DAILY AT BEDTIME    [DISCONTINUED] sertraline (ZOLOFT) 100 mg tablet Take 2 tablets (200 mg total) by mouth daily       Objective     BP 90/58 (BP Location: Left arm, Patient Position: Sitting, Cuff Size: Standard)   Pulse 75   Temp 98.4 °F (36.9 °C) (Tympanic)   Ht 4' 7\" (1.397 m)   Wt 40.8 kg (90 lb)   SpO2 97%   BMI 20.92 kg/m²     Physical Exam  Constitutional:       Appearance: Normal appearance. She is well-developed.   HENT:      Head: Normocephalic and atraumatic.      Right Ear: External ear normal.      Left Ear: External ear normal.   Eyes:      Pupils: Pupils are equal, round, and reactive to light.   Neck:      Thyroid: No thyromegaly.   Cardiovascular:      Rate and Rhythm: Normal rate and regular rhythm.      Heart sounds: Normal heart sounds.   Pulmonary:      Effort: Pulmonary effort is normal.      Breath sounds: Normal breath sounds.   Musculoskeletal:         General: Normal range of motion.      Cervical back: Neck supple.   Lymphadenopathy:      Cervical: No cervical adenopathy.   Skin:     General: Skin is warm and dry.   Neurological:      Mental Status: She is alert.   Psychiatric:         Mood and Affect: Mood " normal.         Behavior: Behavior normal.       Luke Gibbs MD     ,DirectAddress_Unknown

## 2024-11-19 NOTE — ED BEHAVIORAL HEALTH ASSESSMENT NOTE - CURRENT MEDICATION
Ativan 0.5 mg qhs po  Paxil 2 Ativan 0.5 mg qhs po  Paxil 40 mg PO daily Ativan 0.5 mg qhs po  Paxil 40 mg PO daily     verified with pharmacy

## 2025-01-16 NOTE — PROGRESS NOTE ADULT - SUBJECTIVE AND OBJECTIVE BOX
Patient is a 83y old  Female who presents with a chief complaint of Abdominal pain,   Found to have partial SBO (28 Mar 2019 00:29)    Interval events: NGT placed    Today is hospital day 1  Patient is  resting comfortably in bed  Denies Abdominal pain, last BM was yesterday PTP  + Bowel sounds but faint, NT, ND  Plan: Obstructive series Per Surgery  -No surgical intervention for now    PAST MEDICAL & SURGICAL HISTORY:  Vertigo  Hypothyroid  Brain aneurysm  AAA (abdominal aortic aneurysm)  HLD (hyperlipidemia)  HTN (hypertension)  Depression  Anxiety  Panic disorder  DM (diabetes mellitus)  CVA (cerebral vascular accident)  H/O: hysterectomy  Status post cataract surgery      MEDICATIONS  (STANDING):  amLODIPine   Tablet 5 milliGRAM(s) Oral daily  aspirin enteric coated 81 milliGRAM(s) Oral daily  chlorhexidine 4% Liquid 1 Application(s) Topical <User Schedule>  clopidogrel Tablet 75 milliGRAM(s) Oral daily  dextrose 5%. 1000 milliLiter(s) (50 mL/Hr) IV Continuous <Continuous>  dextrose 50% Injectable 12.5 Gram(s) IV Push once  dextrose 50% Injectable 25 Gram(s) IV Push once  dextrose 50% Injectable 25 Gram(s) IV Push once  enoxaparin Injectable 40 milliGRAM(s) SubCutaneous daily  influenza   Vaccine 0.5 milliLiter(s) IntraMuscular once  insulin glargine Injectable (LANTUS) 34 Unit(s) SubCutaneous every morning  insulin lispro (HumaLOG) corrective regimen sliding scale   SubCutaneous three times a day before meals  insulin lispro Injectable (HumaLOG) 4 Unit(s) SubCutaneous before breakfast  insulin lispro Injectable (HumaLOG) 4 Unit(s) SubCutaneous before lunch  insulin lispro Injectable (HumaLOG) 4 Unit(s) SubCutaneous before dinner  lactated ringers. 1000 milliLiter(s) (75 mL/Hr) IV Continuous <Continuous>  levothyroxine 112 MICROGram(s) Oral daily  LORazepam     Tablet 0.5 milliGRAM(s) Oral at bedtime  pantoprazole    Tablet 40 milliGRAM(s) Oral before breakfast  PARoxetine 40 milliGRAM(s) Oral daily  simvastatin 40 milliGRAM(s) Oral at bedtime    MEDICATIONS  (PRN):  dextrose 40% Gel 15 Gram(s) Oral once PRN Blood Glucose LESS THAN 70 milliGRAM(s)/deciliter  glucagon  Injectable 1 milliGRAM(s) IntraMuscular once PRN Glucose LESS THAN 70 milligrams/deciliter          Vital Signs Last 24 Hrs  T(C): 37.2 (28 Mar 2019 12:30), Max: 37.2 (28 Mar 2019 12:30)  T(F): 98.9 (28 Mar 2019 12:30), Max: 98.9 (28 Mar 2019 12:30)  HR: 88 (28 Mar 2019 12:30) (82 - 88)  BP: 119/78 (28 Mar 2019 12:30) (119/78 - 159/74)  BP(mean): --  RR: 17 (28 Mar 2019 12:30) (16 - 17)  SpO2: --  CAPILLARY BLOOD GLUCOSE      POCT Blood Glucose.: 195 mg/dL (28 Mar 2019 11:21)  POCT Blood Glucose.: 292 mg/dL (28 Mar 2019 07:45)  POCT Blood Glucose.: 193 mg/dL (27 Mar 2019 22:03)    I&O's Summary    27 Mar 2019 07:01  -  28 Mar 2019 07:00  --------------------------------------------------------  IN: 75 mL / OUT: 0 mL / NET: 75 mL    28 Mar 2019 07:01  -  28 Mar 2019 13:11  --------------------------------------------------------  IN: 150 mL / OUT: 0 mL / NET: 150 mL        Physical Exam:    -     General : NAD, laying comfortably in bed, NGT in place draining clear/mucous  liquid     -      Cardiac: S1/S2 appreciated, RRR    -      Pulm: CTA b/l, no adventitious sound    -      GI: Faint BS, ND, NT, Soft, denies flatus    -      Musculoskeletal: Atraumatic, no edema, no skin color changes    -      Neuro: AO x 3, non focal         Labs:                        13.2   8.87  )-----------( 389      ( 28 Mar 2019 07:31 )             42.1             -    138  |  96<L>  |  22<H>  ----------------------------<  290<H>  4.7   |  25  |  1.2    Ca    9.3      28 Mar 2019 07:31  Mg     1.8         TPro  6.7  /  Alb  4.0  /  TBili  0.6  /  DBili  x   /  AST  11  /  ALT  6   /  AlkPhos  105      LIVER FUNCTIONS - ( 28 Mar 2019 07:31 )  Alb: 4.0 g/dL / Pro: 6.7 g/dL / ALK PHOS: 105 U/L / ALT: 6 U/L / AST: 11 U/L / GGT: x                 PT/INR - ( 27 Mar 2019 14:45 )   PT: 12.50 sec;   INR: 1.09 ratio         PTT - ( 27 Mar 2019 14:45 )  PTT:35.4 sec  CARDIAC MARKERS ( 27 Mar 2019 14:45 )  x     / <0.01 ng/mL / x     / x     / x            Urinalysis Basic - ( 27 Mar 2019 14:00 )    Color: Yellow / Appearance: Cloudy / S.025 / pH: x  Gluc: x / Ketone: Negative  / Bili: Small / Urobili: 1.0 mg/dL   Blood: x / Protein: Trace mg/dL / Nitrite: Negative   Leuk Esterase: Small / RBC: 1-2 /HPF / WBC 3-5 /HPF   Sq Epi: x / Non Sq Epi: Few /HPF / Bacteria: x      Imaging:    ECG: No

## 2025-03-06 NOTE — ED PROVIDER NOTE - PHYSICAL EXAMINATION
Detail Level: Generalized
Sunscreen Recommendations: Recommend mineral sunscreens, wide brimmed hat, protective clothing and avoiding direct sunlight during peak hours of the day.
Detail Level: Detailed
When Should The Patient Follow-Up For Their Next Full-Body Skin Exam?: 3 Months
Quality 137: Melanoma: Continuity Of Care - Recall System: Patient information entered into a recall system that includes: target date for the next exam specified AND a process to follow up with patients regarding missed or unscheduled appointments
Show Follow-Up Variable: Yes
VITAL SIGNS: I have reviewed nursing notes and confirm.  CONSTITUTIONAL: Well-developed; well-nourished; in no acute distress.  SKIN: Skin exam is warm and dry, no acute rash.  HEAD: Normocephalic; atraumatic.  EYES: PERRL, EOM intact; conjunctiva and sclera clear.  ENT: No nasal discharge; airway clear. TMs clear.  NECK: Supple; non tender.  CARD: S1, S2 normal; no murmurs, gallops, or rubs. Regular rate and rhythm.  RESP: No wheezes, rales or rhonchi.  ABD: Normal bowel sounds; soft; non-distended; non-tender  EXT: Normal ROM. No clubbing, cyanosis or edema.  NEURO: Alert, oriented. Grossly unremarkable. No focal deficits.  PSYCH: Cooperative, appropriate.

## 2025-05-06 NOTE — PATIENT PROFILE ADULT - TRANSPORTATION
Anesthesia Post Evaluation    Patient: Irlanda Lopez    Procedure(s) Performed: Procedure(s) (LRB):  CYSTOSCOPY, WITH RETROGRADE PYELOGRAM AND URETERAL STENT INSERTION (Right)  REMOVAL-STENT (Right)  URETEROSCOPY, WITH LASER LITHOTRIPSY (Right)    Final Anesthesia Type: general      Patient location during evaluation: PACU  Patient participation: Yes- Able to Participate  Level of consciousness: awake and alert  Post-procedure vital signs: reviewed and stable  Pain management: adequate  Airway patency: patent  LEAH mitigation strategies: Extubation while patient is awake  PONV status at discharge: No PONV  Anesthetic complications: no      Cardiovascular status: hemodynamically stable  Respiratory status: spontaneous ventilation  Hydration status: euvolemic  Follow-up not needed.              Vitals Value Taken Time   /78 05/06/25 12:39   Temp 36.3 °C (97.3 °F) 05/06/25 11:50   Pulse 64 05/06/25 12:43   Resp 38 05/06/25 12:42   SpO2 96 % 05/06/25 12:43   Vitals shown include unfiled device data.      Event Time   Out of Recovery 12:48:17         Pain/Babar Score: Babar Score: 10 (5/6/2025 12:50 PM)           no